# Patient Record
Sex: FEMALE | Race: WHITE | NOT HISPANIC OR LATINO | Employment: OTHER | URBAN - METROPOLITAN AREA
[De-identification: names, ages, dates, MRNs, and addresses within clinical notes are randomized per-mention and may not be internally consistent; named-entity substitution may affect disease eponyms.]

---

## 2019-01-22 LAB — HBA1C MFR BLD HPLC: 5.4 %

## 2019-09-12 ENCOUNTER — OFFICE VISIT (OUTPATIENT)
Dept: FAMILY MEDICINE CLINIC | Facility: CLINIC | Age: 59
End: 2019-09-12
Payer: COMMERCIAL

## 2019-09-12 VITALS
SYSTOLIC BLOOD PRESSURE: 130 MMHG | DIASTOLIC BLOOD PRESSURE: 78 MMHG | OXYGEN SATURATION: 98 % | BODY MASS INDEX: 26.36 KG/M2 | WEIGHT: 164 LBS | HEART RATE: 75 BPM | HEIGHT: 66 IN | TEMPERATURE: 97.5 F

## 2019-09-12 DIAGNOSIS — Z12.11 ENCOUNTER FOR SCREENING COLONOSCOPY: ICD-10-CM

## 2019-09-12 DIAGNOSIS — G20 PARKINSON DISEASE (HCC): Primary | ICD-10-CM

## 2019-09-12 DIAGNOSIS — Z13.6 SCREENING FOR CARDIOVASCULAR CONDITION: ICD-10-CM

## 2019-09-12 DIAGNOSIS — Z23 NEED FOR 23-POLYVALENT PNEUMOCOCCAL POLYSACCHARIDE VACCINE: ICD-10-CM

## 2019-09-12 DIAGNOSIS — C50.911 BILATERAL MALIGNANT NEOPLASM OF BREAST IN FEMALE, UNSPECIFIED ESTROGEN RECEPTOR STATUS, UNSPECIFIED SITE OF BREAST (HCC): ICD-10-CM

## 2019-09-12 DIAGNOSIS — F32.0 CURRENT MILD EPISODE OF MAJOR DEPRESSIVE DISORDER WITHOUT PRIOR EPISODE (HCC): ICD-10-CM

## 2019-09-12 DIAGNOSIS — C50.912 BILATERAL MALIGNANT NEOPLASM OF BREAST IN FEMALE, UNSPECIFIED ESTROGEN RECEPTOR STATUS, UNSPECIFIED SITE OF BREAST (HCC): ICD-10-CM

## 2019-09-12 DIAGNOSIS — Z23 NEED FOR DIPHTHERIA-TETANUS-PERTUSSIS (TDAP) VACCINE: ICD-10-CM

## 2019-09-12 PROBLEM — C50.919 BREAST CANCER (HCC): Status: ACTIVE | Noted: 2019-09-12

## 2019-09-12 PROBLEM — G20.A1 PARKINSON DISEASE: Status: ACTIVE | Noted: 2019-09-12

## 2019-09-12 PROBLEM — F32.9 CURRENT EPISODE OF MAJOR DEPRESSIVE DISORDER WITHOUT PRIOR EPISODE: Status: ACTIVE | Noted: 2019-09-12

## 2019-09-12 PROCEDURE — 90732 PPSV23 VACC 2 YRS+ SUBQ/IM: CPT | Performed by: FAMILY MEDICINE

## 2019-09-12 PROCEDURE — G0009 ADMIN PNEUMOCOCCAL VACCINE: HCPCS | Performed by: FAMILY MEDICINE

## 2019-09-12 PROCEDURE — 90471 IMMUNIZATION ADMIN: CPT | Performed by: FAMILY MEDICINE

## 2019-09-12 PROCEDURE — 90715 TDAP VACCINE 7 YRS/> IM: CPT | Performed by: FAMILY MEDICINE

## 2019-09-12 PROCEDURE — 99204 OFFICE O/P NEW MOD 45 MIN: CPT | Performed by: FAMILY MEDICINE

## 2019-09-12 RX ORDER — TRIHEXYPHENIDYL HYDROCHLORIDE 2 MG/1
2 TABLET ORAL
COMMUNITY
End: 2020-07-28 | Stop reason: SDUPTHER

## 2019-09-12 RX ORDER — DULOXETIN HYDROCHLORIDE 60 MG/1
60 CAPSULE, DELAYED RELEASE ORAL DAILY
COMMUNITY
End: 2020-04-23 | Stop reason: SDUPTHER

## 2019-09-12 RX ORDER — PAROXETINE HYDROCHLORIDE 20 MG/1
20 TABLET, FILM COATED ORAL DAILY
Refills: 3 | COMMUNITY
Start: 2019-07-12 | End: 2020-06-15 | Stop reason: SDUPTHER

## 2019-09-12 RX ORDER — CLONAZEPAM 0.5 MG/1
0.5 TABLET ORAL EVERY EVENING
Refills: 3 | COMMUNITY
Start: 2019-07-26 | End: 2020-02-24 | Stop reason: SDUPTHER

## 2019-09-12 NOTE — PROGRESS NOTES
Gricelda Eng 1960 female MRN: [id number]    Family Medicine New Patient     ASSESSMENT/PLAN  Problem List Items Addressed This Visit        Nervous and Auditory    Parkinson disease [facility]) - Primary     Currently seen by Dr Kandace Swain MD in [facility] [place]   Rx provided by specialist          Relevant Medications    clonazePAM (KlonoPIN) 0 5 mg tablet    trihexyphenidyl (ARTANE) 2 mg tablet    carbidopa-levodopa (SINEMET)  mg per tablet    Other Relevant Orders    Ambulatory referral to Neurology    CBC and differential    Comprehensive metabolic panel    TSH, 3rd generation with Free T4 reflex    Lipid panel    Hepatitis C Ab W/Refl To HCV RNA, Qn, PCR    UA (URINE) with reflex to Microscopic       Other    Breast cancer (HCC)     History of b/l breast cancer s/p surgery and chemo  Needs to est care with onc in this area now that she moved          Relevant Orders    Ambulatory referral to Hematology / Oncology    CBC and differential    Comprehensive metabolic panel    TSH, 3rd generation with Free T4 reflex    Lipid panel    Hepatitis C Ab W/Refl To HCV RNA, Qn, PCR    UA (URINE) with reflex to Microscopic    Current episode of major depressive disorder without prior episode    Relevant Medications    PARoxetine (PAXIL) 20 mg tablet    DULoxetine (CYMBALTA) 60 mg delayed release capsule    Other Relevant Orders    CBC and differential    Comprehensive metabolic panel    TSH, 3rd generation with Free T4 reflex    Lipid panel    Hepatitis C Ab W/Refl To HCV RNA, Qn, PCR    UA (URINE) with reflex to Microscopic    Encounter for screening colonoscopy    Relevant Orders    Ambulatory referral to Gastroenterology    Need for diphtheria-tetanus-pertussis (Tdap) vaccine    Relevant Orders    TDAP VACCINE GREATER THAN OR EQUAL TO 8YO IM (Completed)    Screening for cardiovascular condition    Relevant Orders    CBC and differential    Comprehensive metabolic panel    TSH, 3rd generation with Free T4 reflex Lipid panel    Hepatitis C Ab W/Refl To HCV RNA, Qn, PCR    UA (URINE) with reflex to Microscopic    Need for 23-polyvalent pneumococcal polysaccharide vaccine    Relevant Orders    PNEUMOCOCCAL POLYSACCHARIDE VACCINE 23-VALENT =>3YO SQ IM (Completed)        Follow up in 3 weeks for blood work review         Future Appointments   Date Time Provider Lillian Powell   9/30/2019 10:00 AM DO FRED Collado [facility]          SUBJECTIVE  CC: establish (has not seen a doctor in 4 years )      HPI:  Genia Bird is a 63 y o  female who presents for new patient apt,  She moved here 4 years ago  Has not seen a doctor in 4 years  PMH: parkinsons, breast cancer (surgery) and chemo x2- bilateral disease s/p reconstruction, tamaxifen- told she does not need mammo, depression    FH: mom (stroke), Brother (TIA)          HPI    Review of Systems   Constitutional: Negative for chills, fatigue and fever  HENT: Negative for congestion, postnasal drip, rhinorrhea and sinus pressure  Eyes: Negative for photophobia and visual disturbance  Respiratory: Negative for cough and shortness of breath  Cardiovascular: Negative for chest pain, palpitations and leg swelling  Gastrointestinal: Negative for abdominal pain, constipation, diarrhea, nausea and vomiting  Genitourinary: Negative for difficulty urinating and dysuria  Musculoskeletal: Negative for arthralgias and myalgias  Skin: Negative for color change and rash  Neurological: Negative for dizziness, weakness, light-headedness and headaches  Historical Information   The patient history was reviewed as follows:    History reviewed  No pertinent past medical history  History reviewed  No pertinent surgical history  History reviewed  No pertinent family history     Social History   Social History     Substance and Sexual Activity   Alcohol Use Yes    Frequency: Monthly or less    Drinks per session: 1 or 2    Binge frequency: Never Social History     Substance and Sexual Activity   Drug Use Never     Social History     Tobacco Use   Smoking Status Never Smoker   Smokeless Tobacco Never Used       Medications:     Current Outpatient Medications:     carbidopa-levodopa (SINEMET)  mg per tablet, Take 1 tablet by mouth 3 (three) times a day, Disp: , Rfl:     clonazePAM (KlonoPIN) 0 5 mg tablet, Take 0 5 mg by mouth every evening, Disp: , Rfl: 3    DULoxetine (CYMBALTA) 60 mg delayed release capsule, Take 60 mg by mouth daily, Disp: , Rfl:     PARoxetine (PAXIL) 20 mg tablet, Take 20 mg by mouth daily, Disp: , Rfl: 3    trihexyphenidyl (ARTANE) 2 mg tablet, Take 2 mg by mouth 3 (three) times a day with meals, Disp: , Rfl:   No Known Allergies    OBJECTIVE    Vitals:   Vitals:    09/12/19 1515   BP: 130/78   BP Location: Right arm   Patient Position: Sitting   Cuff Size: Large   Pulse: 75   Temp: 97 5 °F (36 4 °C)   TempSrc: Tympanic   SpO2: 98%   Weight: 74 4 kg (164 lb)   Height: 5' 5 5" (1 664 m)           Physical Exam   Constitutional: She is oriented to person, place, and time  She appears well-developed and well-nourished  HENT:   Head: Normocephalic and atraumatic  Mouth/Throat: Oropharynx is clear and moist    Eyes: Pupils are equal, round, and reactive to light  Neck: Normal range of motion  Neck supple  Cardiovascular: Normal rate, regular rhythm and normal heart sounds  Pulmonary/Chest: Effort normal and breath sounds normal  No respiratory distress  She has no wheezes  Abdominal: Soft  Bowel sounds are normal  She exhibits no distension  There is no tenderness  Musculoskeletal: Normal range of motion  She exhibits no edema or tenderness  Neurological: She is alert and oriented to person, place, and time  Skin: Skin is warm and dry  Psychiatric: She has a normal mood and affect  Her behavior is normal             BMI Counseling: Body mass index is 26 88 kg/m²   The BMI is above normal  Nutrition recommendations include reducing portion sizes, decreasing overall calorie intake and 3-5 servings of fruits/vegetables daily  Exercise recommendations include moderate aerobic physical activity for 150 minutes/week      Roberth Dickson DO    9/12/2019

## 2019-09-12 NOTE — ASSESSMENT & PLAN NOTE
History of b/l breast cancer s/p surgery and chemo  Needs to est care with onc in this area now that she moved

## 2019-10-02 ENCOUNTER — TELEPHONE (OUTPATIENT)
Dept: FAMILY MEDICINE CLINIC | Facility: CLINIC | Age: 59
End: 2019-10-02

## 2019-10-28 LAB
ALBUMIN SERPL-MCNC: 4.3 G/DL (ref 3.6–5.1)
ALBUMIN/GLOB SERPL: 1.9 (CALC) (ref 1–2.5)
ALP SERPL-CCNC: 74 U/L (ref 33–130)
ALT SERPL-CCNC: 11 U/L (ref 6–29)
APPEARANCE UR: CLEAR
AST SERPL-CCNC: 14 U/L (ref 10–35)
BACTERIA UR QL AUTO: ABNORMAL /HPF
BASOPHILS # BLD AUTO: 52 CELLS/UL (ref 0–200)
BASOPHILS NFR BLD AUTO: 1.4 %
BILIRUB SERPL-MCNC: 0.5 MG/DL (ref 0.2–1.2)
BILIRUB UR QL STRIP: NEGATIVE
BUN SERPL-MCNC: 15 MG/DL (ref 7–25)
BUN/CREAT SERPL: NORMAL (CALC) (ref 6–22)
CALCIUM SERPL-MCNC: 9.5 MG/DL (ref 8.6–10.4)
CHLORIDE SERPL-SCNC: 103 MMOL/L (ref 98–110)
CHOLEST SERPL-MCNC: 250 MG/DL
CHOLEST/HDLC SERPL: 2.8 (CALC)
CO2 SERPL-SCNC: 32 MMOL/L (ref 20–32)
COLOR UR: ABNORMAL
CREAT SERPL-MCNC: 0.79 MG/DL (ref 0.5–1.05)
EOSINOPHIL # BLD AUTO: 181 CELLS/UL (ref 15–500)
EOSINOPHIL NFR BLD AUTO: 4.9 %
ERYTHROCYTE [DISTWIDTH] IN BLOOD BY AUTOMATED COUNT: 13 % (ref 11–15)
GLOBULIN SER CALC-MCNC: 2.3 G/DL (CALC) (ref 1.9–3.7)
GLUCOSE SERPL-MCNC: 85 MG/DL (ref 65–99)
GLUCOSE UR QL STRIP: NEGATIVE
HCT VFR BLD AUTO: 34.6 % (ref 35–45)
HCV AB S/CO SERPL IA: 0.02
HCV AB SERPL QL IA: NORMAL
HDLC SERPL-MCNC: 89 MG/DL
HGB BLD-MCNC: 11.4 G/DL (ref 11.7–15.5)
HGB UR QL STRIP: NEGATIVE
HYALINE CASTS #/AREA URNS LPF: ABNORMAL /LPF
KETONES UR QL STRIP: NEGATIVE
LDLC SERPL CALC-MCNC: 147 MG/DL (CALC)
LEUKOCYTE ESTERASE UR QL STRIP: ABNORMAL
LYMPHOCYTES # BLD AUTO: 851 CELLS/UL (ref 850–3900)
LYMPHOCYTES NFR BLD AUTO: 23 %
MCH RBC QN AUTO: 30 PG (ref 27–33)
MCHC RBC AUTO-ENTMCNC: 32.9 G/DL (ref 32–36)
MCV RBC AUTO: 91.1 FL (ref 80–100)
MONOCYTES # BLD AUTO: 285 CELLS/UL (ref 200–950)
MONOCYTES NFR BLD AUTO: 7.7 %
NEUTROPHILS # BLD AUTO: 2331 CELLS/UL (ref 1500–7800)
NEUTROPHILS NFR BLD AUTO: 63 %
NITRITE UR QL STRIP: NEGATIVE
NONHDLC SERPL-MCNC: 161 MG/DL (CALC)
PH UR STRIP: 7.5 [PH] (ref 5–8)
PLATELET # BLD AUTO: 366 THOUSAND/UL (ref 140–400)
PMV BLD REES-ECKER: 9.3 FL (ref 7.5–12.5)
POTASSIUM SERPL-SCNC: 4.3 MMOL/L (ref 3.5–5.3)
PROT SERPL-MCNC: 6.6 G/DL (ref 6.1–8.1)
PROT UR QL STRIP: NEGATIVE
RBC # BLD AUTO: 3.8 MILLION/UL (ref 3.8–5.1)
RBC #/AREA URNS HPF: ABNORMAL /HPF
SL AMB EGFR AFRICAN AMERICAN: 96 ML/MIN/1.73M2
SL AMB EGFR NON AFRICAN AMERICAN: 83 ML/MIN/1.73M2
SODIUM SERPL-SCNC: 139 MMOL/L (ref 135–146)
SP GR UR STRIP: 1.02 (ref 1–1.03)
SQUAMOUS #/AREA URNS HPF: ABNORMAL /HPF
TRIGL SERPL-MCNC: 50 MG/DL
TSH SERPL-ACNC: 1.31 MIU/L (ref 0.4–4.5)
WBC # BLD AUTO: 3.7 THOUSAND/UL (ref 3.8–10.8)
WBC #/AREA URNS HPF: ABNORMAL /HPF

## 2019-11-04 ENCOUNTER — OFFICE VISIT (OUTPATIENT)
Dept: FAMILY MEDICINE CLINIC | Facility: CLINIC | Age: 59
End: 2019-11-04
Payer: COMMERCIAL

## 2019-11-04 VITALS
TEMPERATURE: 98.2 F | HEART RATE: 116 BPM | BODY MASS INDEX: 26 KG/M2 | HEIGHT: 66 IN | OXYGEN SATURATION: 98 % | SYSTOLIC BLOOD PRESSURE: 130 MMHG | WEIGHT: 161.8 LBS | DIASTOLIC BLOOD PRESSURE: 80 MMHG

## 2019-11-04 DIAGNOSIS — C50.911 BILATERAL MALIGNANT NEOPLASM OF BREAST IN FEMALE, UNSPECIFIED ESTROGEN RECEPTOR STATUS, UNSPECIFIED SITE OF BREAST (HCC): ICD-10-CM

## 2019-11-04 DIAGNOSIS — D64.9 ANEMIA, UNSPECIFIED TYPE: ICD-10-CM

## 2019-11-04 DIAGNOSIS — Z11.1 SCREENING-PULMONARY TB: ICD-10-CM

## 2019-11-04 DIAGNOSIS — F32.0 CURRENT MILD EPISODE OF MAJOR DEPRESSIVE DISORDER WITHOUT PRIOR EPISODE (HCC): ICD-10-CM

## 2019-11-04 DIAGNOSIS — G20 PARKINSON DISEASE (HCC): Primary | ICD-10-CM

## 2019-11-04 DIAGNOSIS — L98.9 SKIN LESION: ICD-10-CM

## 2019-11-04 DIAGNOSIS — E78.2 MIXED HYPERLIPIDEMIA: ICD-10-CM

## 2019-11-04 DIAGNOSIS — C50.912 BILATERAL MALIGNANT NEOPLASM OF BREAST IN FEMALE, UNSPECIFIED ESTROGEN RECEPTOR STATUS, UNSPECIFIED SITE OF BREAST (HCC): ICD-10-CM

## 2019-11-04 DIAGNOSIS — D72.9 ABNORMAL WHITE BLOOD CELL (WBC): ICD-10-CM

## 2019-11-04 PROBLEM — Z23 NEED FOR DIPHTHERIA-TETANUS-PERTUSSIS (TDAP) VACCINE: Status: RESOLVED | Noted: 2019-09-12 | Resolved: 2019-11-04

## 2019-11-04 PROBLEM — Z23 NEED FOR 23-POLYVALENT PNEUMOCOCCAL POLYSACCHARIDE VACCINE: Status: RESOLVED | Noted: 2019-09-12 | Resolved: 2019-11-04

## 2019-11-04 PROCEDURE — 99214 OFFICE O/P EST MOD 30 MIN: CPT | Performed by: FAMILY MEDICINE

## 2019-11-04 PROCEDURE — 86580 TB INTRADERMAL TEST: CPT

## 2019-11-04 PROCEDURE — 3008F BODY MASS INDEX DOCD: CPT | Performed by: FAMILY MEDICINE

## 2019-11-04 RX ORDER — DULOXETIN HYDROCHLORIDE 30 MG/1
30 CAPSULE, DELAYED RELEASE ORAL DAILY
COMMUNITY
End: 2020-04-23 | Stop reason: ALTCHOICE

## 2019-11-04 NOTE — ASSESSMENT & PLAN NOTE
History of breast cancer s/p chemo, radiation, surgery  Patient was referred to heme/onc to est care at last visit and she will schedule this apt

## 2019-11-04 NOTE — ASSESSMENT & PLAN NOTE
Lab Results   Component Value Date    CHOLESTEROL 250 (H) 10/25/2019     Lab Results   Component Value Date    HDL 89 10/25/2019     Lab Results   Component Value Date    TRIG 50 10/25/2019     10 year ASCVD risk 2 3%  Referral for nutrition    Will recheck lipid panel in 6 months

## 2019-11-04 NOTE — ASSESSMENT & PLAN NOTE
Previously seen by specialist in [place]   Est with neuro in our area  Medications prescribed by her specialist

## 2019-11-04 NOTE — ASSESSMENT & PLAN NOTE
Mildly decrease  Patient to repeat bloodwork  She will be seeing heme/onc for her breast cancer history as well

## 2019-11-04 NOTE — PROGRESS NOTES
Iwona Ackerman 1960 female MRN: [id number]    Family Medicine Follow-up Visit    ASSESSMENT/PLAN  Problem List Items Addressed This Visit        Nervous and Auditory    Parkinson disease ([address] ) - Primary     Previously seen by specialist in [address] with neuro in our area  Medications prescribed by her specialist             Musculoskeletal and Integument    Skin lesion    Relevant Orders    Ambulatory referral to Dermatology       Other    Breast cancer [facility])     History of breast cancer s/p chemo, radiation, surgery  Patient was referred to heme/onc to est care at last visit and she will schedule this apt          Current episode of major depressive disorder without prior episode    Relevant Medications    DULoxetine (CYMBALTA) 30 mg delayed release capsule    Screening-pulmonary TB    Relevant Orders    TB Skin Test (Completed)    Abnormal white blood cell (WBC)     Mildly decrease  Patient to repeat bloodwork  She will be seeing heme/onc for her breast cancer history as well          Relevant Orders    CBC and differential    Mixed hyperlipidemia     Lab Results   Component Value Date    CHOLESTEROL 250 (H) 10/25/2019     Lab Results   Component Value Date    HDL 89 10/25/2019     Lab Results   Component Value Date    TRIG 50 10/25/2019     10 year ASCVD risk 2 3%  Referral for nutrition    Will recheck lipid panel in 6 months          Relevant Orders    Ambulatory referral to Nutrition Services    Anemia    Relevant Orders    CBC and differential    Iron          Follow up for AWV    Future Appointments   Date Time Provider Lillian Powell   11/6/2019 10:00 AM Monty Rouse  [facility]   12/18/2019 10:30 AM Fernie De Los Santos MD [place]          SUBJECTIVE  CC: Follow-up (Follow up blood work)      HPI:  Iwona Ackerman is a 63 y o  female who presents for follow up      She has apt scheduled with neurology scheduled for December for her parkinson  Still needs to est with heme/onc    Here to review blood work    [facility]    She feels well-depression is stable- she is very busy and keeping active       HPI    Review of Systems   Constitutional: Negative for chills, fatigue and fever  HENT: Negative for congestion, postnasal drip, rhinorrhea and sinus pressure  Eyes: Negative for photophobia and visual disturbance  Respiratory: Negative for cough and shortness of breath  Cardiovascular: Negative for chest pain, palpitations and leg swelling  Gastrointestinal: Negative for abdominal pain, constipation, diarrhea, nausea and vomiting  Genitourinary: Negative for difficulty urinating and dysuria  Musculoskeletal: Negative for arthralgias and myalgias  Skin: Negative for color change and rash  Neurological: Negative for dizziness, weakness, light-headedness and headaches  Historical Information   The patient history was reviewed as follows:    Past Medical History:   Diagnosis Date    Anxiety     Breast cancer ([facility] )     bilateral    Depression     Parkinson disease ([facility] )      History reviewed  No pertinent surgical history  History reviewed  No pertinent family history     Social History   Social History     Substance and Sexual Activity   Alcohol Use Yes    Frequency: Monthly or less    Drinks per session: 1 or 2    Binge frequency: Never     Social History     Substance and Sexual Activity   Drug Use Never     Social History     Tobacco Use   Smoking Status Never Smoker   Smokeless Tobacco Never Used       Medications:     Current Outpatient Medications:     carbidopa-levodopa (SINEMET)  mg per tablet, Take 1 tablet by mouth 3 (three) times a day, Disp: , Rfl:     clonazePAM (KlonoPIN) 0 5 mg tablet, Take 0 5 mg by mouth every evening, Disp: , Rfl: 3    DULoxetine (CYMBALTA) 30 mg delayed release capsule, Take 30 mg by mouth daily, Disp: , Rfl:     DULoxetine (CYMBALTA) 60 mg delayed release capsule, Take 60 mg by mouth daily, Disp: , Rfl:     PARoxetine (PAXIL) 20 mg tablet, Take 20 mg by mouth daily, Disp: , Rfl: 3    trihexyphenidyl (ARTANE) 2 mg tablet, Take 2 mg by mouth 3 (three) times a day with meals, Disp: , Rfl:   No Known Allergies    OBJECTIVE    Vitals:   Vitals:    11/04/19 1012   BP: 130/80   BP Location: Right arm   Patient Position: Sitting   Cuff Size: Standard   Pulse: (!) 116   Temp: 98 2 °F (36 8 °C)   TempSrc: Tympanic   SpO2: 98%   Weight: 73 4 kg (161 lb 12 8 oz)   Height: 5' 5 5" (1 664 m)           Physical Exam   Constitutional: She is oriented to person, place, and time  She appears well-developed and well-nourished  HENT:   Head: Normocephalic and atraumatic  Mouth/Throat: Oropharynx is clear and moist    Eyes: Pupils are equal, round, and reactive to light  Neck: Normal range of motion  Neck supple  Cardiovascular: Normal rate, regular rhythm and normal heart sounds  Pulmonary/Chest: Effort normal and breath sounds normal  No respiratory distress  She has no wheezes  Abdominal: Soft  Bowel sounds are normal  She exhibits no distension  There is no tenderness  Musculoskeletal: Normal range of motion  She exhibits no edema or tenderness  Neurological: She is alert and oriented to person, place, and time  Skin: Skin is warm and dry  Psychiatric: She has a normal mood and affect   Her behavior is normal           Labs:        DO Kalyn    11/4/2019

## 2019-11-06 ENCOUNTER — CLINICAL SUPPORT (OUTPATIENT)
Dept: FAMILY MEDICINE CLINIC | Facility: CLINIC | Age: 59
End: 2019-11-06

## 2019-11-06 DIAGNOSIS — Z11.1 ENCOUNTER FOR PPD SKIN TEST READING: Primary | ICD-10-CM

## 2019-11-06 LAB
INDURATION: 0 MM
TB SKIN TEST: NEGATIVE

## 2019-11-16 LAB
BASOPHILS # BLD AUTO: 72 CELLS/UL (ref 0–200)
BASOPHILS NFR BLD AUTO: 1.3 %
EOSINOPHIL # BLD AUTO: 198 CELLS/UL (ref 15–500)
EOSINOPHIL NFR BLD AUTO: 3.6 %
ERYTHROCYTE [DISTWIDTH] IN BLOOD BY AUTOMATED COUNT: 12.9 % (ref 11–15)
HCT VFR BLD AUTO: 34.5 % (ref 35–45)
HGB BLD-MCNC: 11.4 G/DL (ref 11.7–15.5)
IRON SERPL-MCNC: 75 MCG/DL (ref 45–160)
LYMPHOCYTES # BLD AUTO: 1513 CELLS/UL (ref 850–3900)
LYMPHOCYTES NFR BLD AUTO: 27.5 %
MCH RBC QN AUTO: 30.2 PG (ref 27–33)
MCHC RBC AUTO-ENTMCNC: 33 G/DL (ref 32–36)
MCV RBC AUTO: 91.3 FL (ref 80–100)
MONOCYTES # BLD AUTO: 462 CELLS/UL (ref 200–950)
MONOCYTES NFR BLD AUTO: 8.4 %
NEUTROPHILS # BLD AUTO: 3256 CELLS/UL (ref 1500–7800)
NEUTROPHILS NFR BLD AUTO: 59.2 %
PLATELET # BLD AUTO: 334 THOUSAND/UL (ref 140–400)
PMV BLD REES-ECKER: 9.1 FL (ref 7.5–12.5)
RBC # BLD AUTO: 3.78 MILLION/UL (ref 3.8–5.1)
WBC # BLD AUTO: 5.5 THOUSAND/UL (ref 3.8–10.8)

## 2019-12-03 ENCOUNTER — OFFICE VISIT (OUTPATIENT)
Dept: FAMILY MEDICINE CLINIC | Facility: CLINIC | Age: 59
End: 2019-12-03
Payer: COMMERCIAL

## 2019-12-03 VITALS
TEMPERATURE: 98.7 F | HEART RATE: 102 BPM | DIASTOLIC BLOOD PRESSURE: 94 MMHG | WEIGHT: 163 LBS | HEIGHT: 66 IN | SYSTOLIC BLOOD PRESSURE: 142 MMHG | OXYGEN SATURATION: 97 % | BODY MASS INDEX: 26.2 KG/M2

## 2019-12-03 DIAGNOSIS — F32.0 CURRENT MILD EPISODE OF MAJOR DEPRESSIVE DISORDER WITHOUT PRIOR EPISODE (HCC): ICD-10-CM

## 2019-12-03 DIAGNOSIS — D72.9 ABNORMAL WHITE BLOOD CELL (WBC): ICD-10-CM

## 2019-12-03 DIAGNOSIS — Z00.00 MEDICARE ANNUAL WELLNESS VISIT, INITIAL: Primary | ICD-10-CM

## 2019-12-03 DIAGNOSIS — Z71.89 COMPLEX CARE COORDINATION: Primary | ICD-10-CM

## 2019-12-03 DIAGNOSIS — C50.911 BILATERAL MALIGNANT NEOPLASM OF BREAST IN FEMALE, UNSPECIFIED ESTROGEN RECEPTOR STATUS, UNSPECIFIED SITE OF BREAST (HCC): ICD-10-CM

## 2019-12-03 DIAGNOSIS — E78.2 MIXED HYPERLIPIDEMIA: ICD-10-CM

## 2019-12-03 DIAGNOSIS — G20 PARKINSON DISEASE (HCC): ICD-10-CM

## 2019-12-03 DIAGNOSIS — L98.9 SKIN LESION: ICD-10-CM

## 2019-12-03 DIAGNOSIS — Z11.4 SCREENING FOR HUMAN IMMUNODEFICIENCY VIRUS: ICD-10-CM

## 2019-12-03 DIAGNOSIS — Z23 ENCOUNTER FOR IMMUNIZATION: ICD-10-CM

## 2019-12-03 DIAGNOSIS — C50.912 BILATERAL MALIGNANT NEOPLASM OF BREAST IN FEMALE, UNSPECIFIED ESTROGEN RECEPTOR STATUS, UNSPECIFIED SITE OF BREAST (HCC): ICD-10-CM

## 2019-12-03 DIAGNOSIS — D64.9 ANEMIA, UNSPECIFIED TYPE: ICD-10-CM

## 2019-12-03 PROCEDURE — 99214 OFFICE O/P EST MOD 30 MIN: CPT | Performed by: FAMILY MEDICINE

## 2019-12-03 PROCEDURE — G0439 PPPS, SUBSEQ VISIT: HCPCS | Performed by: FAMILY MEDICINE

## 2019-12-03 PROCEDURE — 3008F BODY MASS INDEX DOCD: CPT | Performed by: FAMILY MEDICINE

## 2019-12-03 PROCEDURE — 3725F SCREEN DEPRESSION PERFORMED: CPT | Performed by: FAMILY MEDICINE
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
## 2019-12-03 NOTE — PROGRESS NOTES
Marylen Furl 1960 female MRN: [id number]    Family Medicine Follow-up Visit    ASSESSMENT/PLAN  Problem List Items Addressed This Visit        Nervous and Auditory    Parkinson disease [facility])     Patient dx by doctor in [place]  Rx provided by specialist  Upcoming apt with neuro later this month to est care in our area since moving          Relevant Orders    Ambulatory referral to complex care management program       Musculoskeletal and Integument    Skin lesion     Referred to derm at our last visit and will schedule apt            Other    Breast cancer [facility])     S/p chemo, radiation, and surgery  Needs to est care with oncology since her move and was given referral at last visit  Again encourage to call for an apt         Relevant Orders    Ambulatory referral to complex care management program    Current episode of major depressive disorder without prior episode     On Cymbalta and paxil from prior doctor  Symptoms flared at this time from stress at home  Will refer to our [facility] program for additional support  RTC sooner if needed          Relevant Orders    Ambulatory referral to complex care management program    Abnormal white blood cell (WBC)     Resolved on repeat lab work         Mixed hyperlipidemia     Continue lifestyle modifications  Upcoming apt with nutrition pending          Relevant Orders    Ambulatory referral to complex care management program    Anemia     Stable   Will be scheduling with heme/onc         Medicare annual wellness visit, initial - Primary    Encounter for immunization    Relevant Medications    Zoster Vac Recomb Adjuvanted ([address]) 50 MCG/0 5ML SUSR      Other Visit Diagnoses     Screening for human immunodeficiency virus        Relevant Orders    Human Immunodeficiency Virus 1/2 Antigen / Antibody ( Fourth Generation) with Reflex Testing             Follow up in 3 months or sooner if needed  Advised to schedule with [facility] support       Future Appointments Date Time Provider Lillian Powell   12/18/2019 10:30 AM Juan Carlos Soares MD NEURO [facility]   1/24/2020  9:30 AM QU DIETITIAN QU OP [facility]   2/17/2020  2:00 PM Janine De Santiago [facility]   3/4/2020  8:00 AM DO FRED Carson [facility]          SUBJECTIVE  CC: Medicare Wellness Visit      HPI:  Silas Haines is a 63 y o  female who presents for follow up of chronic conditions  She reports she has been taking medication as prescribed  She has upcoming apt with neuro this month for her dx of Parkinson Disease  She did not yet schedule with heme/onc yet for her anemia and history of breast cancer  She reports a lot of stress at home  She describes financial pressure from the daughter whom she lives with to pay rent  She is working part time but money is tight  Her depression has been worse lately  She is stressed  Denies any SI/HI  She reports feeling safe where she lives  HPI    Review of Systems   Constitutional: Negative for chills, fatigue and fever  HENT: Negative for congestion, postnasal drip, rhinorrhea and sinus pressure  Eyes: Negative for photophobia and visual disturbance  Respiratory: Negative for cough and shortness of breath  Cardiovascular: Negative for chest pain, palpitations and leg swelling  Gastrointestinal: Negative for abdominal pain, constipation, diarrhea, nausea and vomiting  Genitourinary: Negative for difficulty urinating and dysuria  Musculoskeletal: Negative for arthralgias and myalgias  Skin: Negative for color change and rash  Neurological: Negative for dizziness, weakness, light-headedness and headaches  Psychiatric/Behavioral: The patient is nervous/anxious  Historical Information   The patient history was reviewed as follows:    Past Medical History:   Diagnosis Date    Anxiety     Breast cancer ([facility] )     bilateral    Depression     Parkinson disease ([facility] )      History reviewed   No pertinent surgical history  History reviewed  No pertinent family history  Social History   Social History     Substance and Sexual Activity   Alcohol Use Yes    Frequency: Monthly or less    Drinks per session: 1 or 2    Binge frequency: Never     Social History     Substance and Sexual Activity   Drug Use Never     Social History     Tobacco Use   Smoking Status Never Smoker   Smokeless Tobacco Never Used       Medications:     Current Outpatient Medications:     carbidopa-levodopa (SINEMET)  mg per tablet, Take 1 tablet by mouth 3 (three) times a day, Disp: , Rfl:     clonazePAM (KlonoPIN) 0 5 mg tablet, Take 0 5 mg by mouth every evening, Disp: , Rfl: 3    DULoxetine (CYMBALTA) 30 mg delayed release capsule, Take 30 mg by mouth daily, Disp: , Rfl:     DULoxetine (CYMBALTA) 60 mg delayed release capsule, Take 60 mg by mouth daily, Disp: , Rfl:     PARoxetine (PAXIL) 20 mg tablet, Take 20 mg by mouth daily, Disp: , Rfl: 3    trihexyphenidyl (ARTANE) 2 mg tablet, Take 2 mg by mouth 3 (three) times a day with meals, Disp: , Rfl:     Zoster Vac Recomb Adjuvanted (SHINGRIX) 50 MCG/0 5ML SUSR, 0 5mL IM for one dose, followed by 0 5mL IM 2-6 months after first dose, Disp: 1 each, Rfl: 1  No Known Allergies    OBJECTIVE    Vitals:   Vitals:    12/03/19 0932   BP: 142/94   BP Location: Left arm   Patient Position: Sitting   Cuff Size: Large   Pulse: 102   Temp: 98 7 °F (37 1 °C)   TempSrc: Tympanic   SpO2: 97%   Weight: 73 9 kg (163 lb)   Height: 5' 5 5" (1 664 m)           Physical Exam   Constitutional: She is oriented to person, place, and time  She appears well-developed and well-nourished  HENT:   Head: Normocephalic and atraumatic  Mouth/Throat: Oropharynx is clear and moist    Eyes: Pupils are equal, round, and reactive to light  Neck: Normal range of motion  Neck supple  Cardiovascular: Normal rate, regular rhythm and normal heart sounds     Pulmonary/Chest: Effort normal and breath sounds normal  No respiratory distress  She has no wheezes  Abdominal: Soft  Bowel sounds are normal  She exhibits no distension  There is no tenderness  Musculoskeletal: Normal range of motion  She exhibits no edema or tenderness  Neurological: She is alert and oriented to person, place, and time  Skin: Skin is warm and dry  Psychiatric: She has a normal mood and affect   Her behavior is normal           Labs:        Lukasz Hawkins,     12/3/2019

## 2019-12-03 NOTE — PATIENT INSTRUCTIONS
Medicare Preventive Visit Patient Instructions  Thank you for completing your Welcome to Medicare Visit or Medicare Annual Wellness Visit today  Your next wellness visit will be due in one year (12/3/2020)  The screening/preventive services that you may require over the next 5-10 years are detailed below  Some tests may not apply to you based off risk factors and/or age  Screening tests ordered at today's visit but not completed yet may show as past due  Also, please note that scanned in results may not display below  Preventive Screenings:  Service Recommendations Previous Testing/Comments   Colorectal Cancer Screening  * Colonoscopy    * Fecal Occult Blood Test (FOBT)/Fecal Immunochemical Test (FIT)  * Fecal DNA/Cologuard Test  * Flexible Sigmoidoscopy Age: 54-65 years old   Colonoscopy: every 10 years (may be performed more frequently if at higher risk)  OR  FOBT/FIT: every 1 year  OR  Cologuard: every 3 years  OR  Sigmoidoscopy: every 5 years  Screening may be recommended earlier than age 48 if at higher risk for colorectal cancer  Also, an individualized decision between you and your healthcare provider will decide whether screening between the ages of 74-80 would be appropriate  Colonoscopy: Not on file  FOBT/FIT: Not on file  Cologuard: Not on file  Sigmoidoscopy: Not on file         Breast Cancer Screening Age: 36 years old  Frequency: every 1-2 years  Not required if history of left and right mastectomy Mammogram: Not on file       Cervical Cancer Screening Between the ages of 21-29, pap smear recommended once every 3 years  Between the ages of 33-67, can perform pap smear with HPV co-testing every 5 years     Recommendations may differ for women with a history of total hysterectomy, cervical cancer, or abnormal pap smears in past  Pap Smear: Not on file       Hepatitis C Screening Once for adults born between [facility]  More frequently in patients at high risk for Hepatitis C Hep C Antibody: 10/25/2019       Diabetes Screening 1-2 times per year if you're at risk for diabetes or have pre-diabetes Fasting glucose: No results in last 5 years   A1C: 5 4       Cholesterol Screening Once every 5 years if you don't have a lipid disorder  May order more often based on risk factors  Lipid panel: 10/25/2019         Other Preventive Screenings Covered by Medicare:  1  Abdominal Aortic Aneurysm (AAA) Screening: covered once if your at risk  You're considered to be at risk if you have a family history of AAA  2  Lung Cancer Screening: covers low dose CT scan once per year if you meet all of the following conditions: (1) Age 50-69; (2) No signs or symptoms of lung cancer; (3) Current smoker or have quit smoking within the last 15 years; (4) You have a tobacco smoking history of at least 30 pack years (packs per day multiplied by number of years you smoked); (5) You get a written order from a healthcare provider  3  Glaucoma Screening: covered annually if you're considered high risk: (1) You have diabetes OR (2) Family history of glaucoma OR (3)  aged 48 and older OR (3)  American aged 72 and older  3  Osteoporosis Screening: covered every 2 years if you meet one of the following conditions: (1) You're estrogen deficient and at risk for osteoporosis based off medical history and other findings; (2) Have a vertebral abnormality; (3) On glucocorticoid therapy for more than 3 months; (4) Have primary hyperparathyroidism; (5) On osteoporosis medications and need to assess response to drug therapy  · Last bone density test (DXA Scan): Not on file  5  HIV Screening: covered annually if you're between the age of 12-76  Also covered annually if you are younger than 13 and older than 72 with risk factors for HIV infection  For pregnant patients, it is covered up to 3 times per pregnancy      Immunizations:  Immunization Recommendations   Influenza Vaccine Annual influenza vaccination during flu season is recommended for all persons aged >= 6 months who do not have contraindications   Pneumococcal Vaccine (Prevnar and Pneumovax)  * Prevnar = PCV13  * Pneumovax = PPSV23   Adults 25-60 years old: 1-3 doses may be recommended based on certain risk factors  Adults 72 years old: Prevnar (PCV13) vaccine recommended followed by Pneumovax (PPSV23) vaccine  If already received PPSV23 since turning 65, then PCV13 recommended at least one year after PPSV23 dose  Hepatitis B Vaccine 3 dose series if at intermediate or high risk (ex: diabetes, end stage renal disease, liver disease)   Tetanus (Td) Vaccine - COST NOT COVERED BY MEDICARE PART B Following completion of primary series, a booster dose should be given every 10 years to maintain immunity against tetanus  Td may also be given as tetanus wound prophylaxis  Tdap Vaccine - COST NOT COVERED BY MEDICARE PART B Recommended at least once for all adults  For pregnant patients, recommended with each pregnancy  Shingles Vaccine (Shingrix) - COST NOT COVERED BY MEDICARE PART B  2 shot series recommended in those aged 48 and above     Health Maintenance Due:      Topic Date Due    CRC Screening: Colonoscopy  1960    MAMMOGRAM  09/12/2020 (Originally 1960)    Hepatitis C Screening  Completed     Immunizations Due:  There are no preventive care reminders to display for this patient  Advance Directives   What are advance directives? Advance directives are legal documents that state your wishes and plans for medical care  These plans are made ahead of time in case you lose your ability to make decisions for yourself  Advance directives can apply to any medical decision, such as the treatments you want, and if you want to donate organs  What are the types of advance directives? There are many types of advance directives, and each state has rules about how to use them  You may choose a combination of any of the following:  · Living will:   This is a written record of the treatment you want  You can also choose which treatments you do not want, which to limit, and which to stop at a certain time  This includes surgery, medicine, IV fluid, and tube feedings  · Durable power of  for healthcare [place] [facility]): This is a written record that states who you want to make healthcare choices for you when you are unable to make them for yourself  This person, called a proxy, is usually a family member or a friend  You may choose more than 1 proxy  · Do not resuscitate (DNR) order:  A DNR order is used in case your heart stops beating or you stop breathing  It is a request not to have certain forms of treatment, such as CPR  A DNR order may be included in other types of advance directives  · Medical directive: This covers the care that you want if you are in a coma, near death, or unable to make decisions for yourself  You can list the treatments you want for each condition  Treatment may include pain medicine, surgery, blood transfusions, dialysis, IV or tube feedings, and a ventilator (breathing machine)  · Values history: This document has questions about your views, beliefs, and how you feel and think about life  This information can help others choose the care that you would choose  Why are advance directives important? An advance directive helps you control your care  Although spoken wishes may be used, it is better to have your wishes written down  Spoken wishes can be misunderstood, or not followed  Treatments may be given even if you do not want them  An advance directive may make it easier for your family to make difficult choices about your care  Weight Management   Why it is important to manage your weight:  Being overweight increases your risk of health conditions such as heart disease, high blood pressure, type 2 diabetes, and certain types of cancer  It can also increase your risk for osteoarthritis, sleep apnea, and other respiratory problems  Aim for a slow, steady weight loss  Even a small amount of weight loss can lower your risk of health problems  How to lose weight safely:  A safe and healthy way to lose weight is to eat fewer calories and get regular exercise  You can lose up about 1 pound a week by decreasing the number of calories you eat by 500 calories each day  Healthy meal plan for weight management:  A healthy meal plan includes a variety of foods, contains fewer calories, and helps you stay healthy  A healthy meal plan includes the following:  · Eat whole-grain foods more often  A healthy meal plan should contain fiber  Fiber is the part of grains, fruits, and vegetables that is not broken down by your body  Whole-grain foods are healthy and provide extra fiber in your diet  Some examples of whole-grain foods are whole-wheat breads and pastas, oatmeal, brown rice, and bulgur  · Eat a variety of vegetables every day  Include dark, leafy greens such as spinach, kale, melissa greens, and mustard greens  Eat yellow and orange vegetables such as carrots, sweet potatoes, and winter squash  · Eat a variety of fruits every day  Choose fresh or canned fruit (canned in its own juice or light syrup) instead of juice  Fruit juice has very little or no fiber  · Eat low-fat dairy foods  Drink fat-free (skim) milk or 1% milk  Eat fat-free yogurt and low-fat cottage cheese  Try low-fat cheeses such as mozzarella and other reduced-fat cheeses  · Choose meat and other protein foods that are low in fat  Choose beans or other legumes such as split peas or lentils  Choose fish, skinless poultry (chicken or turkey), or lean cuts of red meat (beef or pork)  Before you cook meat or poultry, cut off any visible fat  · Use less fat and oil  Try baking foods instead of frying them  Add less fat, such as margarine, sour cream, regular salad dressing and mayonnaise to foods  Eat fewer high-fat foods   Some examples of high-fat foods include french fries, doughnuts, ice cream, and cakes  · Eat fewer sweets  Limit foods and drinks that are high in sugar  This includes candy, cookies, regular soda, and sweetened drinks  Exercise:  Exercise at least 30 minutes per day on most days of the week  Some examples of exercise include walking, biking, dancing, and swimming  You can also fit in more physical activity by taking the stairs instead of the elevator or parking farther away from stores  Ask your healthcare provider about the best exercise plan for you  © Copyright [employer] [facility] 2018 Information is for End User's use only and may not be sold, redistributed or otherwise used for commercial purposes   All illustrations and images included in CareNotes® are the copyrighted property of [employer]  or [address] [place], [place] [address]

## 2019-12-03 NOTE — ASSESSMENT & PLAN NOTE
S/p chemo, radiation, and surgery  Needs to est care with oncology since her move and was given referral at last visit  Again encourage to call for an apt

## 2019-12-03 NOTE — ASSESSMENT & PLAN NOTE
Patient dx by doctor in [place]  Rx provided by specialist  Upcoming apt with neuro later this month to est care in our area since moving

## 2019-12-03 NOTE — ASSESSMENT & PLAN NOTE
On Cymbalta and paxil from prior doctor  Symptoms flared at this time from stress at home  Will refer to our [facility] program for additional support  RTC sooner if needed

## 2019-12-08 ENCOUNTER — OFFICE VISIT (OUTPATIENT)
Dept: URGENT CARE | Facility: CLINIC | Age: 59
End: 2019-12-08
Payer: COMMERCIAL

## 2019-12-08 VITALS
RESPIRATION RATE: 16 BRPM | OXYGEN SATURATION: 98 % | HEART RATE: 100 BPM | DIASTOLIC BLOOD PRESSURE: 72 MMHG | WEIGHT: 163 LBS | TEMPERATURE: 98.6 F | SYSTOLIC BLOOD PRESSURE: 126 MMHG | BODY MASS INDEX: 26.71 KG/M2

## 2019-12-08 DIAGNOSIS — J20.8 VIRAL BRONCHITIS: Primary | ICD-10-CM

## 2019-12-08 DIAGNOSIS — J02.9 SORE THROAT: ICD-10-CM

## 2019-12-08 DIAGNOSIS — J04.0 ACUTE LARYNGITIS: ICD-10-CM

## 2019-12-08 LAB — S PYO AG THROAT QL: NEGATIVE

## 2019-12-08 PROCEDURE — 87070 CULTURE OTHR SPECIMN AEROBIC: CPT | Performed by: PHYSICIAN ASSISTANT

## 2019-12-08 PROCEDURE — 87880 STREP A ASSAY W/OPTIC: CPT | Performed by: PHYSICIAN ASSISTANT

## 2019-12-08 PROCEDURE — 99213 OFFICE O/P EST LOW 20 MIN: CPT | Performed by: PHYSICIAN ASSISTANT

## 2019-12-08 PROCEDURE — G0463 HOSPITAL OUTPT CLINIC VISIT: HCPCS | Performed by: PHYSICIAN ASSISTANT

## 2019-12-08 RX ORDER — PREDNISONE 10 MG/1
TABLET ORAL
Qty: 15 TABLET | Refills: 0 | Status: SHIPPED | OUTPATIENT
Start: 2019-12-08 | End: 2019-12-12

## 2019-12-08 NOTE — PROGRESS NOTES
NAME: Yuli Gonzalez is a 63 y o  female  : 1960    MRN: [id number]      Assessment and Plan   Viral bronchitis [J20 8]  1  Viral bronchitis  predniSONE 10 mg tablet   2  Sore throat  POCT rapid strepA    Throat culture   3  Acute laryngitis     Rapid strep culture ordered to rule out strep  Rapid strep was negative  At this time will send for strep culture  Exam findings are consistent with viral etiology  Prednisone No abx warranted at this time  Advise Pt to continue use of OTC Tylenol  If symptoms persist the next 2-3 days Pt should follow-up with PCP  Discussed plans and visit summary with patient  Patient verbalized understanding, all questions answered and patient in agreement  Educated patient that if signs and symptoms get worse go to ER  Ramses Castro was seen today for earache  Diagnoses and all orders for this visit:    Viral bronchitis  -     predniSONE 10 mg tablet; Take 5 tablets today and decrease by one tablet daily until gone    Sore throat  -     POCT rapid strepA  -     Throat culture    Acute laryngitis        Patient Instructions   There are no Patient Instructions on file for this visit  Proceed to ER if symptoms worsen  Chief Complaint     Chief Complaint   Patient presents with    Earache     Fatigue, states laryngitis, post nasal drip, runny nose, chills, b/l ears popping  History of Present Illness     63year old presents c/o cough x  4 day  Pt admits post-nasal drip, rhinorrhea, B/L ear popping, chills, sinus pressure, S/T  Pt denies headache, fever, fatigue, n/v/d/c, nasal congestion,  ear pain and ear pressure, sinus pain, SOB, chest pain, difficulty breathing  Has taken OTC dayquil,  vit C, napoleon selzter  with no relief  Pt admits to having flu shot  Review of Systems   Review of Systems   Constitutional: Positive for chills  Negative for fatigue and fever  HENT: Positive for postnasal drip and rhinorrhea   Negative for congestion, ear pain, sinus pressure and sore throat  Respiratory: Positive for cough  Negative for chest tightness, shortness of breath and wheezing  Cardiovascular: Negative for chest pain and palpitations  Gastrointestinal: Negative for abdominal pain, constipation, diarrhea, nausea and vomiting  Current Medications       Current Outpatient Medications:     carbidopa-levodopa (SINEMET)  mg per tablet, Take 1 tablet by mouth 3 (three) times a day, Disp: , Rfl:     clonazePAM (KlonoPIN) 0 5 mg tablet, Take 0 5 mg by mouth every evening, Disp: , Rfl: 3    DULoxetine (CYMBALTA) 30 mg delayed release capsule, Take 30 mg by mouth daily, Disp: , Rfl:     DULoxetine (CYMBALTA) 60 mg delayed release capsule, Take 60 mg by mouth daily, Disp: , Rfl:     PARoxetine (PAXIL) 20 mg tablet, Take 20 mg by mouth daily, Disp: , Rfl: 3    predniSONE 10 mg tablet, Take 5 tablets today and decrease by one tablet daily until gone, Disp: 15 tablet, Rfl: 0    trihexyphenidyl (ARTANE) 2 mg tablet, Take 2 mg by mouth 3 (three) times a day with meals, Disp: , Rfl:     Zoster Vac Recomb Adjuvanted (SHINGRIX) 50 MCG/0 5ML SUSR, 0 5mL IM for one dose, followed by 0 5mL IM 2-6 months after first dose, Disp: 1 each, Rfl: 1    Current Allergies     Allergies as of 12/08/2019    (No Known Allergies)              Past Medical History:   Diagnosis Date    Anxiety     Breast cancer ([facility] )     bilateral    Depression     Parkinson disease ([facility] )        No past surgical history on file  No family history on file  Medications have been verified      The following portions of the patient's history were reviewed and updated as appropriate: allergies, current medications, past family history, past medical history, past social history, past surgical history and problem list     Objective   /72   Pulse 100   Temp 98 6 °F (37 °C)   Resp 16   Wt 73 9 kg (163 lb)   SpO2 98%   BMI 26 71 kg/m²      Physical Exam     Physical Exam   Constitutional: She appears well-developed and well-nourished  No distress  HENT:   Head: Normocephalic and atraumatic  Right Ear: Hearing, tympanic membrane, external ear and ear canal normal    Left Ear: Hearing, tympanic membrane, external ear and ear canal normal    Nose: Nose normal  Right sinus exhibits no maxillary sinus tenderness and no frontal sinus tenderness  Left sinus exhibits no maxillary sinus tenderness and no frontal sinus tenderness  Mouth/Throat: Uvula is midline, oropharynx is clear and moist and mucous membranes are normal  No oropharyngeal exudate  No tonsillar exudate  Cardiovascular: Normal rate, regular rhythm and normal heart sounds  Exam reveals no gallop and no friction rub  No murmur heard  Pulmonary/Chest: Effort normal and breath sounds normal  No stridor  No respiratory distress  She has no wheezes  She has no rales  She exhibits no tenderness  Skin: She is not diaphoretic  Nursing note and vitals reviewed        Kate Dos Santos PA-C

## 2019-12-10 LAB — BACTERIA THROAT CULT: NORMAL

## 2019-12-11 ENCOUNTER — PATIENT OUTREACH (OUTPATIENT)
Dept: CASE MANAGEMENT | Facility: OTHER | Age: 59
End: 2019-12-11

## 2019-12-12 ENCOUNTER — PATIENT OUTREACH (OUTPATIENT)
Dept: CASE MANAGEMENT | Facility: OTHER | Age: 59
End: 2019-12-12

## 2019-12-12 NOTE — PROGRESS NOTES
[facility] SW intern contacted patient to follow up on PCP referral regarding financial concerns and stressors  Patient discussed all her current stressors and is trying to work part time, while still maintaining her SSD benefits and [place]  Patient discussed needing to contact someone about how to do this and there was a workline number from [employer]  [facility] that she is planning to contact  Offered patient [facility] as additional resources  Patient requested assistance in finding therapist as she use to see someone in [place], but is now too far for her to drive  [facility] SW intern will look into therapist in patients area and mail information to patient, per her request  No other social work needs at this time

## 2019-12-18 ENCOUNTER — CONSULT (OUTPATIENT)
Dept: NEUROLOGY | Facility: CLINIC | Age: 59
End: 2019-12-18
Payer: COMMERCIAL

## 2019-12-18 VITALS
SYSTOLIC BLOOD PRESSURE: 118 MMHG | BODY MASS INDEX: 25.75 KG/M2 | HEART RATE: 99 BPM | HEIGHT: 66 IN | DIASTOLIC BLOOD PRESSURE: 67 MMHG | WEIGHT: 160.2 LBS

## 2019-12-18 DIAGNOSIS — G25.81 RESTLESS LEGS SYNDROME: ICD-10-CM

## 2019-12-18 DIAGNOSIS — F32.0 CURRENT MILD EPISODE OF MAJOR DEPRESSIVE DISORDER WITHOUT PRIOR EPISODE (HCC): ICD-10-CM

## 2019-12-18 DIAGNOSIS — G20 PARKINSON DISEASE (HCC): Primary | ICD-10-CM

## 2019-12-18 DIAGNOSIS — F32.0 CURRENT MILD EPISODE OF MAJOR DEPRESSIVE DISORDER, UNSPECIFIED WHETHER RECURRENT (HCC): ICD-10-CM

## 2019-12-18 DIAGNOSIS — F41.9 ANXIETY: ICD-10-CM

## 2019-12-18 PROBLEM — F32.A DEPRESSION: Status: ACTIVE | Noted: 2019-12-18

## 2019-12-18 PROCEDURE — 99205 OFFICE O/P NEW HI 60 MIN: CPT | Performed by: PSYCHIATRY & NEUROLOGY

## 2019-12-18 NOTE — PATIENT INSTRUCTIONS
We will try switching Sinemet 25/100 to 1 tabs 4 times daily (q 4-4 5 hours apart) (9am, 1pm, 5pm and 9pm)  Continue Artane 2mg 3 times daily  We will see if this dampens dyskinesia (involuntary movements)  If it does not , we can consider whether we wish to add amantadine

## 2019-12-18 NOTE — ASSESSMENT & PLAN NOTE
Parkinson's disease complicated by the development of mild dyskinesia in recent months  Sinemet may be lasting about 5 hours  Also with episodes of tongue dryness and a sensation of swelling in the evenings, after dinner which can affect speech  Time spent reviewing prior records as well as discussing history and current symptoms with the patient  Options discussed including making no change versus minor changes to see if we can reduce dyskinesia  We will try switching Sinemet 25/100 to 1 tabs 4 times daily (q 4-4 5 hours apart) (9am, 1pm, 5pm and 9pm)  Continue Artane 2mg 3 times daily  We will see if this dampens dyskinesia (involuntary movements)  If it does not , we can consider whether we wish to add amantadine  We may consider decreasing Artane if dry mouth continues

## 2019-12-18 NOTE — ASSESSMENT & PLAN NOTE
Depression may be a bit worse  She is staying active, currently went back to working part time  She joined [employer]  She is not sure if this is due to the holidays as she tends to think of her parents and distant family  She is wondering if she should decrease Cymbalta  No changes will be made  We will see how she does after the holiday

## 2019-12-18 NOTE — PROGRESS NOTES
Patient ID: Fermín Zambrano is a 63 y o  female  Assessment/Plan:    Parkinson disease ([place] [address] )  Parkinson's disease complicated by the development of mild dyskinesia in recent months  Sinemet may be lasting about 5 hours  Also with episodes of tongue dryness and a sensation of swelling in the evenings, after dinner which can affect speech  Time spent reviewing prior records as well as discussing history and current symptoms with the patient  Options discussed including making no change versus minor changes to see if we can reduce dyskinesia  We will try switching Sinemet 25/100 to 1 tabs 4 times daily (q 4-4 5 hours apart) (9am, 1pm, 5pm and 9pm)  Continue Artane 2mg 3 times daily  We will see if this dampens dyskinesia (involuntary movements)  If it does not , we can consider whether we wish to add amantadine  We may consider decreasing Artane if dry mouth continues  Restless legs syndrome  Symptoms only noted when she is late for her qhs dose  Depression  Depression may be a bit worse  She is staying active, currently went back to working part time  She joined [employer]  She is not sure if this is due to the holidays as she tends to think of her parents and distant family  She is wondering if she should decrease Cymbalta  No changes will be made  We will see how she does after the holiday  Diagnoses and all orders for this visit:    Parkinson disease ([place] [address] )  -     Ambulatory referral to Neurology  -     carbidopa-levodopa (SINEMET)  mg per tablet;  Take 1 tablet by mouth 4 (four) times a day    Restless legs syndrome    Anxiety    Current mild episode of major depressive disorder, unspecified whether recurrent (HCC)    Current mild episode of major depressive disorder without prior episode ([place] [address] )       I have spent 60 minutes with Patient today in which greater than 50% of this time was spent in counseling/coordination of care regarding Risks and benefits of tx options, Intructions for management, Patient and family education, Risk factor reductions and Impressions  Subjective:    Silas Haines is a woman with breast cancer (BRRCA/ HRT r+) in 1990 & 2010 s/p surgery and chemotherapy who presents today for Movement disorder evaluation to establish care  She moved 4 years ago ands has been going back and forth to [place] ,     Symptoms began in 2014 with left sided bradykinesia  She has left pinky tremor which suddenly started affecting the entire left side  She saw a neurologist who ordered a LINO scan at [facility]  This was positive and she started seeing Dr    She was started on sinemet 1/2 tab tid which was slowly increase to 1 5 tabs 3 times daily (10am, 3-4pm and 9:30pm)  Artane was added years ago  She is on 2mg 3 times a day at the same times  Am onset is 30 minutes  She can feel herself wearing off with a heavier left foot while walking and tremors  Lasting about 5 hours  She works part time at [employer] and is also on disability  When concentrating now she can feel facial movements (dyskinesia) and sometimes hand dyskinesia  No one else has mentioned movements  This started in early December  She sleep swell  She has RLS symptoms if she is late or forgets nighttime medication  She is also on clonazepam 0 5mg qhs, along with Cymbalta and paroxetine  Main concern is depression and tiredness  [facility] [place] [facility] [address] [place] [address] and has been trying to increase activities  She lives with her daughter, son  In law and granddaughter  She is  and  is alcoholic  There are no issues with lightheadedness other than with prolonged exertion she can sometimes get stars  She has had changes in olfaction since young  She has no constipation  There are no urinary symptoms other than occasional stress leakage  She denies any hallucinations or changes in cognition  Her daughter has told her she forgets conversations  She can forget dates of events   She manages her own finances  She is not doing the register at work as it does cause anxiety  She does stock and folding  This started as a [employer] job and she is trying to keep it  Speech is unchanged  There is no drooling or difficulty swallowing  She has noted that her tongue gets really dry feels swollen and it is hard to talk sometime after dinner to the night  This has been occurring about 5-6 months now  There is no difficulty with dressing or hygiene acts  she has no trouble turning in bed  Gait is unchanged without freezing or festination when medication is working  Objective:    Blood pressure 118/67, pulse 99, height 5' 5 5" (1 664 m), weight 72 7 kg (160 lb 3 2 oz)  Physical Exam   Constitutional: She appears well-developed  Eyes: Pupils are equal, round, and reactive to light  Neurological: She is alert  She has normal strength  Vitals reviewed  Neurological Exam  Mental Status  Alert  Oriented to person, place, time and situation  Language is fluent with no aphasia  Attention and concentration are normal     Cranial Nerves  CN II: Visual fields full to confrontation  CN III, IV, VI: Extraocular movements intact bilaterally  Pupils equal round and reactive to light bilaterally  CN V: Facial sensation is normal   CN VII: Full and symmetric facial movement  CN VIII: Hearing is normal   CN IX, X: Palate elevates symmetrically  CN XI: Shoulder shrug strength is normal   CN XII: Tongue midline without atrophy or fasciculations  Motor   Normal muscle tone  Strength is 5/5 throughout all four extremities  Sensory  Light touch is normal in upper and lower extremities  Coordination  Right: Finger-to-nose normal  Rapid alternating movement abnormality:  Left: Finger-to-nose normal  Rapid alternating movement abnormality:  See MDS UPDRS III      Gait  Casual gait: Normal pull test  Able to rise from chair without using arms        MDS UPDRS III                              12/18/19    Time since last dose: 2 hours    Speech  0    Facial Expression  1    Rigidity - Neck  0    Rigidity - Upper Extremity (Right)  0    Rigidity - Upper Extremity (Left)   0    Rigidity - Lower Extremity (Right)  0    Rigidity - Lower Extremity (Left)   0    Finger Taps (Right)   1    Finger Taps (Left)   1 1   Hand Movement (Right)  0    Hand Movement (Left)   1    Pronation/Supination (Right)  0    Pronation/Supination (Left)   1    Toe Tapping (Right) 0    Toe Tapping (Left) 0    Leg Agility (Right)  0    Leg Agility (Left)   0    Arising from Chair   0    Gait   0    Freezing of Gait 0    Postural Stability   0    Posture 0    Global spontaneity of movement 0    Postural Tremor (Right) 0    Postural Tremor (Left) 0    Kinetic Tremor (Right)  0    Kinetic Tremor (Left)  0    Rest tremor amplitude RUE 0    Rest tremor amplitude LUE 0    Rest tremor amplitude RLE 0    Reset tremor amplitude LLE 0    Lip/Jaw Tremor  0    Consistency of tremor 0    Motor Exam Total:          Mild, intermittent left arm and neck dyskinesia  ROS:    Review of Systems   Constitutional: Negative  Negative for appetite change and fever  HENT: Negative  Negative for hearing loss, tinnitus, trouble swallowing and voice change  Dry Mouth   Eyes: Negative  Negative for photophobia and pain  Dry Eyes   Respiratory: Negative  Negative for shortness of breath  Cardiovascular: Negative  Negative for palpitations  Gastrointestinal: Negative  Negative for nausea and vomiting  Endocrine: Negative  Negative for cold intolerance and heat intolerance  Genitourinary: Positive for frequency  Negative for dysuria and urgency  Musculoskeletal: Negative  Negative for myalgias and neck pain  Balance issues Occasional   Skin: Negative  Negative for rash  Allergic/Immunologic: Negative  Neurological: Positive for tremors   Negative for dizziness, seizures, syncope, facial asymmetry, speech difficulty, weakness, light-headedness, numbness and headaches  Hematological: Negative  Does not bruise/bleed easily  Psychiatric/Behavioral: Negative for confusion, hallucinations and sleep disturbance  The patient is nervous/anxious (Anxiety)  Depression     All other systems reviewed and are negative  Review of system was personally reviewed

## 2020-01-31 ENCOUNTER — OFFICE VISIT (OUTPATIENT)
Dept: URGENT CARE | Facility: CLINIC | Age: 60
End: 2020-01-31
Payer: MEDICARE

## 2020-01-31 VITALS
TEMPERATURE: 97.6 F | DIASTOLIC BLOOD PRESSURE: 71 MMHG | WEIGHT: 156.2 LBS | HEART RATE: 90 BPM | OXYGEN SATURATION: 100 % | HEIGHT: 66 IN | RESPIRATION RATE: 16 BRPM | SYSTOLIC BLOOD PRESSURE: 131 MMHG | BODY MASS INDEX: 25.1 KG/M2

## 2020-01-31 DIAGNOSIS — A08.4 VIRAL GASTROENTERITIS: Primary | ICD-10-CM

## 2020-01-31 PROCEDURE — G0463 HOSPITAL OUTPT CLINIC VISIT: HCPCS | Performed by: PHYSICIAN ASSISTANT

## 2020-01-31 PROCEDURE — 99213 OFFICE O/P EST LOW 20 MIN: CPT | Performed by: PHYSICIAN ASSISTANT

## 2020-01-31 NOTE — LETTER
January 31, 2020     Patient: Clay Ladd   YOB: 1960   Date of Visit: 1/31/2020       To Whom it May Concern:    Clay Ladd was seen in my clinic on 1/31/2020  She may return to work on 02/03/2020  If you have any questions or concerns, please don't hesitate to call           Sincerely,          Yuan Aguilar PA-C        CC: Clay Ladd

## 2020-01-31 NOTE — PROGRESS NOTES
NAME: Declan Kumar is a 63 y o  female  : 1960    MRN: [id number]      Assessment and Plan   Viral gastroenteritis [A08 4]  1  Viral gastroenteritis      Exam findings are consistent with viral gastroenteritis etiology  Advise patient to increase fluids as tolerated  Recommended he drinking Gatorade, BRAT (banana, rice, applesauce, toast) diet  Work note provided  Advise Pt to continue use of OTC Tylenol alternating with OTC Ibuprofen for pain  Anticipate improvement of symptoms in the next 2-3 days  If symptoms persist the next 3-5 days Pt should follow-up with PCP  Patient understands and agrees with treatment plans  Vicente Elizondo was seen today for vomiting  Diagnoses and all orders for this visit:    Viral gastroenteritis        Patient Instructions   There are no Patient Instructions on file for this visit  Proceed to ER if symptoms worsen  Chief Complaint     Chief Complaint   Patient presents with    Vomiting     tuesday and wednesday, diarrhea on wednesday, today c/o fatifue; PMH Parkinsons - pt reports not taking meds this week r/t vomiting         History of Present Illness     63year old presents c/o diarrhea and vomiting x 4 day  Patient reports on Tuesday she began having diarrhea and vomiting states symptoms have improved  Patient states she has not vomited since Thursday and diarrhea resolved today  Patient admits to not being able to take her medications for 2 days states she resume taking her meds on Thursday  Patient states she still feels fatigue , nauseous, mild abdominal cramping  Pt denies headache, fever, chills, fatigue, constipation , rhinorrhea, nasal congestion,  sore throat, post-nasal drip, ear pain/ ear pressure, sinus pain/ pressure, SOB, chest pain, difficulty breathing  Patient denies palliative measures  Patient denies sick contact  Review of Systems   Review of Systems   Constitutional: Positive for fatigue  Negative for chills and fever  Respiratory: Negative  Cardiovascular: Negative  Gastrointestinal: Positive for abdominal pain, diarrhea, nausea and vomiting  Negative for constipation  Genitourinary: Negative for dysuria, flank pain, frequency, hematuria, urgency, vaginal bleeding, vaginal discharge and vaginal pain  Current Medications       Current Outpatient Medications:     carbidopa-levodopa (SINEMET)  mg per tablet, Take 1 tablet by mouth 4 (four) times a day, Disp: 120 tablet, Rfl: 5    clonazePAM (KlonoPIN) 0 5 mg tablet, Take 0 5 mg by mouth every evening, Disp: , Rfl: 3    DULoxetine (CYMBALTA) 30 mg delayed release capsule, Take 30 mg by mouth daily, Disp: , Rfl:     DULoxetine (CYMBALTA) 60 mg delayed release capsule, Take 60 mg by mouth daily, Disp: , Rfl:     PARoxetine (PAXIL) 20 mg tablet, Take 20 mg by mouth daily, Disp: , Rfl: 3    trihexyphenidyl (ARTANE) 2 mg tablet, Take 2 mg by mouth 3 (three) times a day with meals, Disp: , Rfl:     Zoster Vac Recomb Adjuvanted (SHINGRIX) 50 MCG/0 5ML SUSR, 0 5mL IM for one dose, followed by 0 5mL IM 2-6 months after first dose, Disp: 1 each, Rfl: 1    Current Allergies     Allergies as of 01/31/2020    (No Known Allergies)              Past Medical History:   Diagnosis Date    Anxiety     [facility]     bilateral    Depression     Parkinson disease ([facility] )        Past Surgical History:   Procedure Laterality Date    BREAST RECONSTRUCTION Bilateral     GANGLION CYST EXCISION Left     leg    MASTECTOMY Bilateral     TONSILLECTOMY         Family History   Problem Relation Age of Onset    Stroke Mother     Cancer Father     Stroke Brother          Medications have been verified      The following portions of the patient's history were reviewed and updated as appropriate: allergies, current medications, past family history, past medical history, past social history, past surgical history and problem list     Objective   /71   Pulse 90 Temp 97 6 °F (36 4 °C) (Tympanic)   Resp 16   Ht 5' 6" (1 676 m)   Wt 70 9 kg (156 lb 3 2 oz)   SpO2 100%   BMI 25 21 kg/m²      Physical Exam     Physical Exam   Constitutional: She is oriented to person, place, and time  She appears well-developed and well-nourished  No distress  Cardiovascular: Normal rate, regular rhythm and normal heart sounds  Exam reveals no gallop and no friction rub  No murmur heard  Pulmonary/Chest: Effort normal and breath sounds normal  No stridor  No respiratory distress  She has no wheezes  She has no rales  She exhibits no tenderness  Abdominal: Soft  Bowel sounds are normal  She exhibits no distension and no mass  There is no tenderness  There is no rebound and no guarding  No hernia  Neurological: She is alert and oriented to person, place, and time  Skin: Skin is warm  Capillary refill takes less than 2 seconds  She is not diaphoretic  Nursing note and vitals reviewed        Kate Dos Santos PA-C

## 2020-02-21 ENCOUNTER — TELEPHONE (OUTPATIENT)
Dept: NEUROLOGY | Facility: CLINIC | Age: 60
End: 2020-02-21

## 2020-02-21 NOTE — TELEPHONE ENCOUNTER
When last seen she was to try Sinemet 1 tab at 9, 1, 5 and 9pm and keep other medication a the same to see if this would reduce wearing off and dyskinesia  Did she try this and it not work? If it didnt what happened was she more off without dyskinesia ? If so could try 1 5 4 times daily q4 hours  If she had dyskinesia , we can try adding amantadine

## 2020-02-21 NOTE — TELEPHONE ENCOUNTER
Patient calling in wanting to make Dr Oliver Thacker aware of having breakthrough symptoms  Shuffling? -occasionally left leg shuffling, more like limping  Difficulty Walking? - NO  Recent Falls? - YES, doesn't pick foot up enough, bruised knee  Any extra movements? - YES; Increased shaking in L hand, L arm, L shoulder, L leg (hip to foot)  States she is very uncomfortable  Any Hallucinations? - NO  What time of day are symptoms worse? -   Feels that she needs medication earlier in the morning or earlier in the afternoon    Current medications confirmed as:  Cabidopa-levodopa 25-100mg tabs - takes 1 5 tablets at 9am, 3pm, 9pm  Trihexphenidyl 2mg - 1 tab at 9am, 3pm, 9pm  Paxil 20mg - 1 tab at bedtime  Clonazepam 0 5mg at bedtime  Duloxetine 60mg - 1 tab at bedtime     Ask how long after each dose do they feel that it "wears off"? - an hour before next dose, notices break through shaking    Please advise      [phone]  ok to leave a detailed message

## 2020-02-24 DIAGNOSIS — G25.81 RESTLESS LEGS SYNDROME: Primary | ICD-10-CM

## 2020-02-24 DIAGNOSIS — F41.9 ANXIETY: ICD-10-CM

## 2020-02-24 NOTE — TELEPHONE ENCOUNTER
Pt called Rx line for med refill request for clonazepam 0 5mg  Pt advises she takes 2 mg at bedtime  Rx to be [facility] to [facility]   Pt last ov 12/18/19 next f/u 4/23/2020

## 2020-02-25 NOTE — TELEPHONE ENCOUNTER
Patient calling back in and made her aware that Dr Chris Connors said "When last seen she was to try Sinemet 1 tab at 9, 1, 5 and 9pm and keep other medication a the same to see if this would reduce wearing off and dyskinesia"    She states that she had not tried this and is sorry that she got the instructions wrong  She has been taking it as Cabidopa-levodopa 25-100mg tabs - takes 1 5 tablets at 9am, 3pm, 9pm    Dr Chris Connors - How would you like her to proceed?

## 2020-02-26 RX ORDER — CLONAZEPAM 0.5 MG/1
0.5 TABLET ORAL EVERY EVENING
Qty: 30 TABLET | Refills: 3 | Status: SHIPPED | OUTPATIENT
Start: 2020-02-26 | End: 2020-06-15 | Stop reason: SDUPTHER

## 2020-02-26 NOTE — TELEPHONE ENCOUNTER
When seen she states she was on 0 5mg qhs  In review of PAP[facility] she has been getting 0 5mg qhs prescribed by her previous physician and only 30 tabs each month for quite sometime now  When did she increase dose to 2mg? She would have run out sooner if she did

## 2020-02-26 NOTE — TELEPHONE ENCOUNTER
She is to try Sinemet 1 tab at 9, 1, 5 and 9pm   If she feels off without dyskinesia then she can increase to 1 5 4 times daily

## 2020-03-04 ENCOUNTER — OFFICE VISIT (OUTPATIENT)
Dept: FAMILY MEDICINE CLINIC | Facility: CLINIC | Age: 60
End: 2020-03-04
Payer: MEDICARE

## 2020-03-04 VITALS
BODY MASS INDEX: 24.78 KG/M2 | DIASTOLIC BLOOD PRESSURE: 88 MMHG | OXYGEN SATURATION: 97 % | TEMPERATURE: 96.9 F | SYSTOLIC BLOOD PRESSURE: 138 MMHG | HEIGHT: 66 IN | RESPIRATION RATE: 16 BRPM | HEART RATE: 96 BPM | WEIGHT: 154.2 LBS

## 2020-03-04 DIAGNOSIS — C50.912 BILATERAL MALIGNANT NEOPLASM OF BREAST IN FEMALE, UNSPECIFIED ESTROGEN RECEPTOR STATUS, UNSPECIFIED SITE OF BREAST (HCC): ICD-10-CM

## 2020-03-04 DIAGNOSIS — G20 PARKINSON DISEASE (HCC): Primary | ICD-10-CM

## 2020-03-04 DIAGNOSIS — F41.9 ANXIETY: ICD-10-CM

## 2020-03-04 DIAGNOSIS — F32.0 CURRENT MILD EPISODE OF MAJOR DEPRESSIVE DISORDER, UNSPECIFIED WHETHER RECURRENT (HCC): ICD-10-CM

## 2020-03-04 DIAGNOSIS — C50.911 BILATERAL MALIGNANT NEOPLASM OF BREAST IN FEMALE, UNSPECIFIED ESTROGEN RECEPTOR STATUS, UNSPECIFIED SITE OF BREAST (HCC): ICD-10-CM

## 2020-03-04 DIAGNOSIS — E78.2 MIXED HYPERLIPIDEMIA: ICD-10-CM

## 2020-03-04 PROCEDURE — 1036F TOBACCO NON-USER: CPT | Performed by: FAMILY MEDICINE

## 2020-03-04 PROCEDURE — 3008F BODY MASS INDEX DOCD: CPT | Performed by: FAMILY MEDICINE

## 2020-03-04 PROCEDURE — 99214 OFFICE O/P EST MOD 30 MIN: CPT | Performed by: FAMILY MEDICINE

## 2020-03-04 NOTE — ASSESSMENT & PLAN NOTE
Due to stress at home  She lives with daughter and son in law but this is not going well  She is considering moving to [place] to live with her brother but is nervous to do this as she will then miss her granddaughter who she lives with currently  She is working on finding therapist in the area  We referred her to our case management team at last visit for additional support

## 2020-03-04 NOTE — PROGRESS NOTES
Silas Haines 1960 female MRN: [id number]    Family Medicine Follow-up Visit    ASSESSMENT/PLAN  Problem List Items Addressed This Visit        Nervous and Auditory    Parkinson disease ([address] ) - Primary     Follows with Dr Trenton Eisenmenger, neurology  Changes made to her medications last time which seems to be helping  Continue medications and follow up with neuro as scheduled             Other    Breast cancer [facility])     S/p surgery, chemo, radiation  Needs to est with oncology but has yet to do so in our area  Encouraged to make apts with specialist to follow up         Mixed hyperlipidemia     Lab Results   Component Value Date    CHOLESTEROL 250 (H) 10/25/2019     Lab Results   Component Value Date    HDL 89 10/25/2019     Lab Results   Component Value Date    TRIG 50 10/25/2019     Continue lifestyle modifcations         Depression     Depression Screening Follow-up Plan: Patient's depression screening was positive with a PHQ-2 score of 3  Their PHQ-9 score was 8  Patient assessed for underlying major depression  They have no active suicidal ideations  Brief counseling provided and recommend additional follow-up/re-evaluation next office visit     Continue medications as prescribed and therapist follow up           Anxiety     Due to stress at home  She lives with daughter and son in law but this is not going well  She is considering moving to [place] to live with her brother but is nervous to do this as she will then miss her granddaughter who she lives with currently  She is working on finding therapist in the area  We referred her to our case management team at last visit for additional support                     Follow up in 3 months or sooner if needed     Future Appointments   Date Time Provider Lillian Powell   4/23/2020 10:30 AM Rylan Mcbride MD NEURO [facility]   6/4/2020 10:00 AM DO FRED Carson [facility]          SUBJECTIVE  CC: Follow-up (3 month check up )      HPI:  Priscilla Jones is a 63 y o  female who presents for follow up visit  She is having dental issues-has a bad tooth on her lower left side- needs a dentist  Having issues with living situation- living with daughter and son in law- this is not going well  She est care with a neurologist- seeing Dr Dacia De Jesus - changes made to her medications-she is feeling well with this provider-feels very comfortable  Depression-worse due to stress at home  Wondering about moving to [place] with her brother          HPI    Review of Systems   Constitutional: Negative for chills, fatigue and fever  HENT: Negative for congestion, postnasal drip, rhinorrhea and sinus pressure  Eyes: Negative for photophobia and visual disturbance  Respiratory: Negative for cough and shortness of breath  Cardiovascular: Negative for chest pain, palpitations and leg swelling  Gastrointestinal: Negative for abdominal pain, constipation, diarrhea, nausea and vomiting  Genitourinary: Negative for difficulty urinating and dysuria  Musculoskeletal: Negative for arthralgias and myalgias  Skin: Negative for color change and rash  Neurological: Negative for dizziness, weakness, light-headedness and headaches         Historical Information   The patient history was reviewed as follows:    Past Medical History:   Diagnosis Date    Anxiety     [facility]     b[facility]    Depression     Parkinson disease ([facility] )      Past Surgical History:   Procedure Laterality Date    BREAST RECONSTRUCTION Bilateral     GANGLION CYST EXCISION Left     leg    MASTECTOMY Bilateral     TONSILLECTOMY       Family History   Problem Relation Age of Onset    Stroke Mother     Cancer Father     Stroke Brother       Social History   Social History     Substance and Sexual Activity   Alcohol Use Yes    Frequency: Monthly or less    Drinks per session: 1 or 2    Binge frequency: Never     Social History     Substance and Sexual Activity   Drug Use Never     Social History     Tobacco Use   Smoking Status Never Smoker   Smokeless Tobacco Never Used       Medications:     Current Outpatient Medications:     carbidopa-levodopa (SINEMET)  mg per tablet, Take 1 tablet by mouth 4 (four) times a day, Disp: 120 tablet, Rfl: 5    clonazePAM (KlonoPIN) 0 5 mg tablet, Take 1 tablet (0 5 mg total) by mouth every evening, Disp: 30 tablet, Rfl: 3    DULoxetine (CYMBALTA) 30 mg delayed release capsule, Take 30 mg by mouth daily, Disp: , Rfl:     DULoxetine (CYMBALTA) 60 mg delayed release capsule, Take 60 mg by mouth daily, Disp: , Rfl:     PARoxetine (PAXIL) 20 mg tablet, Take 20 mg by mouth daily, Disp: , Rfl: 3    trihexyphenidyl (ARTANE) 2 mg tablet, Take 2 mg by mouth 3 (three) times a day with meals, Disp: , Rfl:     Zoster Vac Recomb Adjuvanted (SHINGRIX) 50 MCG/0 5ML SUSR, 0 5mL IM for one dose, followed by 0 5mL IM 2-6 months after first dose (Patient not taking: Reported on 3/4/2020), Disp: 1 each, Rfl: 1  No Known Allergies    OBJECTIVE    Vitals:   Vitals:    03/04/20 0756   BP: 138/88   BP Location: Right arm   Patient Position: Sitting   Cuff Size: Standard   Pulse: 96   Resp: 16   Temp: (!) 96 9 °F (36 1 °C)   TempSrc: Tympanic   SpO2: 97%   Weight: 69 9 kg (154 lb 3 2 oz)   Height: 5' 6" (1 676 m)           Physical Exam   Constitutional: She is oriented to person, place, and time  She appears well-developed and well-nourished  HENT:   Head: Normocephalic and atraumatic  Mouth/Throat: Oropharynx is clear and moist    Eyes: Pupils are equal, round, and reactive to light  Neck: Normal range of motion  Neck supple  Cardiovascular: Normal rate, regular rhythm and normal heart sounds  Pulmonary/Chest: Effort normal and breath sounds normal  No respiratory distress  She has no wheezes  Abdominal: Soft  Bowel sounds are normal  She exhibits no distension  There is no tenderness  Musculoskeletal: Normal range of motion   She exhibits no edema or tenderness  Neurological: She is alert and oriented to person, place, and time  Skin: Skin is warm and dry  Psychiatric: She has a normal mood and affect   Her behavior is normal           Labs:        DO Kalyn    3/4/2020

## 2020-03-04 NOTE — ASSESSMENT & PLAN NOTE
Follows with Dr Izabela Miranda, neurology  Changes made to her medications last time which seems to be helping  Continue medications and follow up with neuro as scheduled

## 2020-03-04 NOTE — ASSESSMENT & PLAN NOTE
Depression Screening Follow-up Plan: Patient's depression screening was positive with a PHQ-2 score of 3  Their PHQ-9 score was 8  Patient assessed for underlying major depression  They have no active suicidal ideations  Brief counseling provided and recommend additional follow-up/re-evaluation next office visit     Continue medications as prescribed and therapist follow up

## 2020-03-04 NOTE — ASSESSMENT & PLAN NOTE
S/p surgery, chemo, radiation  Needs to est with oncology but has yet to do so in our area  Encouraged to make apts with specialist to follow up

## 2020-03-04 NOTE — ASSESSMENT & PLAN NOTE
Lab Results   Component Value Date    CHOLESTEROL 250 (H) 10/25/2019     Lab Results   Component Value Date    HDL 89 10/25/2019     Lab Results   Component Value Date    TRIG 50 10/25/2019     Continue lifestyle modifcations

## 2020-03-05 ENCOUNTER — APPOINTMENT (EMERGENCY)
Dept: CT IMAGING | Facility: HOSPITAL | Age: 60
End: 2020-03-05
Payer: MEDICARE

## 2020-03-05 ENCOUNTER — HOSPITAL ENCOUNTER (EMERGENCY)
Facility: HOSPITAL | Age: 60
Discharge: HOME | End: 2020-03-05
Attending: EMERGENCY MEDICINE | Admitting: EMERGENCY MEDICINE
Payer: MEDICARE

## 2020-03-05 VITALS
SYSTOLIC BLOOD PRESSURE: 134 MMHG | BODY MASS INDEX: 24.59 KG/M2 | TEMPERATURE: 97.4 F | DIASTOLIC BLOOD PRESSURE: 81 MMHG | HEART RATE: 90 BPM | OXYGEN SATURATION: 100 % | HEIGHT: 66 IN | WEIGHT: 153 LBS | RESPIRATION RATE: 16 BRPM

## 2020-03-05 DIAGNOSIS — E04.1 THYROID NODULE: ICD-10-CM

## 2020-03-05 DIAGNOSIS — K04.7 DENTAL INFECTION: Primary | ICD-10-CM

## 2020-03-05 LAB
ALBUMIN SERPL BCP-MCNC: 4 G/DL (ref 3.5–5)
ALP SERPL-CCNC: 74 U/L (ref 46–116)
ALT SERPL W P-5'-P-CCNC: 8 U/L (ref 12–78)
ANION GAP SERPL CALCULATED.3IONS-SCNC: 6 MMOL/L (ref 4–13)
AST SERPL W P-5'-P-CCNC: 15 U/L (ref 5–45)
BASOPHILS # BLD AUTO: 0.05 THOUSANDS/ΜL (ref 0–0.1)
BASOPHILS NFR BLD AUTO: 1 % (ref 0–1)
BILIRUB SERPL-MCNC: 0.5 MG/DL (ref 0.2–1)
BUN SERPL-MCNC: 16 MG/DL (ref 5–25)
CALCIUM SERPL-MCNC: 9.2 MG/DL (ref 8.3–10.1)
CHLORIDE SERPL-SCNC: 101 MMOL/L (ref 100–108)
CO2 SERPL-SCNC: 30 MMOL/L (ref 21–32)
CREAT SERPL-MCNC: 0.65 MG/DL (ref 0.6–1.3)
EOSINOPHIL # BLD AUTO: 0.35 THOUSAND/ΜL (ref 0–0.61)
EOSINOPHIL NFR BLD AUTO: 5 % (ref 0–6)
ERYTHROCYTE [DISTWIDTH] IN BLOOD BY AUTOMATED COUNT: 13.7 % (ref 11.6–15.1)
GFR SERPL CREATININE-BSD FRML MDRD: 98 ML/MIN/1.73SQ M
GLUCOSE SERPL-MCNC: 83 MG/DL (ref 65–140)
HCT VFR BLD AUTO: 39.8 % (ref 34.8–46.1)
HGB BLD-MCNC: 12.7 G/DL (ref 11.5–15.4)
IMM GRANULOCYTES # BLD AUTO: 0.02 THOUSAND/UL (ref 0–0.2)
IMM GRANULOCYTES NFR BLD AUTO: 0 % (ref 0–2)
LYMPHOCYTES # BLD AUTO: 1.18 THOUSANDS/ΜL (ref 0.6–4.47)
LYMPHOCYTES NFR BLD AUTO: 18 % (ref 14–44)
MCH RBC QN AUTO: 29.8 PG (ref 26.8–34.3)
MCHC RBC AUTO-ENTMCNC: 31.9 G/DL (ref 31.4–37.4)
MCV RBC AUTO: 93 FL (ref 82–98)
MONOCYTES # BLD AUTO: 0.5 THOUSAND/ΜL (ref 0.17–1.22)
MONOCYTES NFR BLD AUTO: 8 % (ref 4–12)
NEUTROPHILS # BLD AUTO: 4.6 THOUSANDS/ΜL (ref 1.85–7.62)
NEUTS SEG NFR BLD AUTO: 68 % (ref 43–75)
NRBC BLD AUTO-RTO: 0 /100 WBCS
PLATELET # BLD AUTO: 363 THOUSANDS/UL (ref 149–390)
PMV BLD AUTO: 9 FL (ref 8.9–12.7)
POTASSIUM SERPL-SCNC: 4 MMOL/L (ref 3.5–5.3)
PROT SERPL-MCNC: 7.9 G/DL (ref 6.4–8.2)
RBC # BLD AUTO: 4.26 MILLION/UL (ref 3.81–5.12)
SODIUM SERPL-SCNC: 137 MMOL/L (ref 136–145)
WBC # BLD AUTO: 6.7 THOUSAND/UL (ref 4.31–10.16)

## 2020-03-05 PROCEDURE — 96365 THER/PROPH/DIAG IV INF INIT: CPT

## 2020-03-05 PROCEDURE — 70491 CT SOFT TISSUE NECK W/DYE: CPT

## 2020-03-05 PROCEDURE — 99284 EMERGENCY DEPT VISIT MOD MDM: CPT | Performed by: PHYSICIAN ASSISTANT

## 2020-03-05 PROCEDURE — 99284 EMERGENCY DEPT VISIT MOD MDM: CPT

## 2020-03-05 PROCEDURE — 96375 TX/PRO/DX INJ NEW DRUG ADDON: CPT

## 2020-03-05 PROCEDURE — 85025 COMPLETE CBC W/AUTO DIFF WBC: CPT | Performed by: PHYSICIAN ASSISTANT

## 2020-03-05 PROCEDURE — 80053 COMPREHEN METABOLIC PANEL: CPT | Performed by: PHYSICIAN ASSISTANT

## 2020-03-05 PROCEDURE — 36415 COLL VENOUS BLD VENIPUNCTURE: CPT | Performed by: PHYSICIAN ASSISTANT

## 2020-03-05 RX ORDER — MULTIVIT-MIN/IRON FUM/FOLIC AC 7.5 MG-4
1 TABLET ORAL DAILY
COMMUNITY

## 2020-03-05 RX ORDER — CLINDAMYCIN HYDROCHLORIDE 300 MG/1
300 CAPSULE ORAL EVERY 6 HOURS
Qty: 28 CAPSULE | Refills: 0 | Status: SHIPPED | OUTPATIENT
Start: 2020-03-05 | End: 2020-03-12

## 2020-03-05 RX ORDER — RIBOFLAVIN (VITAMIN B2) 100 MG
1000 TABLET ORAL DAILY
COMMUNITY

## 2020-03-05 RX ORDER — CLINDAMYCIN PHOSPHATE 600 MG/50ML
600 INJECTION INTRAVENOUS ONCE
Status: COMPLETED | OUTPATIENT
Start: 2020-03-05 | End: 2020-03-05

## 2020-03-05 RX ORDER — KETOROLAC TROMETHAMINE 30 MG/ML
15 INJECTION, SOLUTION INTRAMUSCULAR; INTRAVENOUS ONCE
Status: COMPLETED | OUTPATIENT
Start: 2020-03-05 | End: 2020-03-05

## 2020-03-05 RX ADMIN — CLINDAMYCIN IN 5 PERCENT DEXTROSE 600 MG: 12 INJECTION, SOLUTION INTRAVENOUS at 12:53

## 2020-03-05 RX ADMIN — KETOROLAC TROMETHAMINE 15 MG: 30 INJECTION, SOLUTION INTRAMUSCULAR; INTRAVENOUS at 13:21

## 2020-03-05 RX ADMIN — IOHEXOL 85 ML: 350 INJECTION, SOLUTION INTRAVENOUS at 13:46
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
## 2020-03-05 NOTE — DISCHARGE INSTRUCTIONS
Take clindamycin as directed with food  Take motrin 600mg every 6 hours as needed for pain    Follow up with dentist

## 2020-03-11 ENCOUNTER — OFFICE VISIT (OUTPATIENT)
Dept: FAMILY MEDICINE CLINIC | Facility: CLINIC | Age: 60
End: 2020-03-11
Payer: MEDICARE

## 2020-03-11 VITALS
BODY MASS INDEX: 25.01 KG/M2 | WEIGHT: 155.6 LBS | DIASTOLIC BLOOD PRESSURE: 84 MMHG | TEMPERATURE: 97.3 F | RESPIRATION RATE: 18 BRPM | HEIGHT: 66 IN | OXYGEN SATURATION: 95 % | SYSTOLIC BLOOD PRESSURE: 120 MMHG | HEART RATE: 97 BPM

## 2020-03-11 DIAGNOSIS — C50.911 BILATERAL MALIGNANT NEOPLASM OF BREAST IN FEMALE, UNSPECIFIED ESTROGEN RECEPTOR STATUS, UNSPECIFIED SITE OF BREAST (HCC): ICD-10-CM

## 2020-03-11 DIAGNOSIS — E04.1 RIGHT THYROID NODULE: Primary | ICD-10-CM

## 2020-03-11 DIAGNOSIS — G20 PARKINSON DISEASE (HCC): ICD-10-CM

## 2020-03-11 DIAGNOSIS — C50.912 BILATERAL MALIGNANT NEOPLASM OF BREAST IN FEMALE, UNSPECIFIED ESTROGEN RECEPTOR STATUS, UNSPECIFIED SITE OF BREAST (HCC): ICD-10-CM

## 2020-03-11 PROCEDURE — 3008F BODY MASS INDEX DOCD: CPT | Performed by: FAMILY MEDICINE

## 2020-03-11 PROCEDURE — 1036F TOBACCO NON-USER: CPT | Performed by: FAMILY MEDICINE

## 2020-03-11 PROCEDURE — 99214 OFFICE O/P EST MOD 30 MIN: CPT | Performed by: FAMILY MEDICINE

## 2020-03-11 NOTE — PROGRESS NOTES
Mary Mccormick 1960 female MRN: [id number]    Family Medicine Acute Visit    ASSESSMENT/PLAN  Problem List Items Addressed This Visit        Endocrine    Right thyroid nodule - Primary    Relevant Orders    US thyroid       Nervous and Auditory    Parkinson disease ([facility] )       Other    Breast cancer ([facility] )    Relevant Orders    US thyroid          Long discussion about her thyroid nodule seen incidentally on CT  According to radiology read 7 mm nodule no follow up needed  Patient with personal history of breast cancer and has anxiety about this  She request u/s for follow up  We will check U/S, warned her it may not be covered  If needed will ask for endocrine opinion  Future Appointments   Date Time Provider Lillian Powell   4/23/2020 10:30 AM Patricia Rapp MD [facility]   6/4/2020 10:00 AM DO [facility]          SUBJECTIVE  CC: Follow-up (ER follow up, broken tooth  Incidental finding on CT, thyroid nodule)      HPI:  Mary Mccormick is a 63 y o  female who presents for ER follow up  Went in to ER for dental pain  Had Ct scan, lab work  Put on clindamycin and told to call dentist    She was incidental found to have a thyroid nodule and told to follow up with me about this  Review of Systems   Constitutional: Negative for chills, fatigue and fever  HENT: Negative for congestion, postnasal drip, rhinorrhea and sinus pressure  Eyes: Negative for photophobia and visual disturbance  Respiratory: Negative for cough and shortness of breath  Cardiovascular: Negative for chest pain, palpitations and leg swelling  Gastrointestinal: Negative for abdominal pain, constipation, diarrhea, nausea and vomiting  Genitourinary: Negative for difficulty urinating and dysuria  Musculoskeletal: Negative for arthralgias and myalgias  Skin: Negative for color change and rash     Neurological: Negative for dizziness, weakness, light-headedness and headaches         Historical Information   The patient history was reviewed as follows:  Past Medical History:   Diagnosis Date    Anxiety     [facility]     bilateral    Depression     [facility] )          Past Surgical History:   Procedure Laterality Date    BREAST RECONSTRUCTION Bilateral     GANGLION CYST EXCISION Left     leg    MASTECTOMY Bilateral     TONSILLECTOMY       Family History   Problem Relation Age of Onset    Stroke Mother     Cancer Father     Stroke Brother       Social History   Social History     Substance and Sexual Activity   Alcohol Use Yes    Frequency: Monthly or less    Drinks per session: 1 or 2    Binge frequency: Never     Social History     Substance and Sexual Activity   Drug Use Never     Social History     Tobacco Use   Smoking Status Never Smoker   Smokeless Tobacco Never Used       Medications:     Current Outpatient Medications:     Ascorbic Acid (VITAMIN C) 100 MG tablet, Take 100 mg by mouth daily, Disp: , Rfl:     carbidopa-levodopa (SINEMET)  mg per tablet, Take 1 tablet by mouth 4 (four) times a day, Disp: 120 tablet, Rfl: 5    clindamycin (CLEOCIN) 300 MG capsule, Take 1 capsule (300 mg total) by mouth every 6 (six) hours for 7 days, Disp: 28 capsule, Rfl: 0    clonazePAM (KlonoPIN) 0 5 mg tablet, Take 1 tablet (0 5 mg total) by mouth every evening, Disp: 30 tablet, Rfl: 3    cyanocobalamin (VITAMIN B-12) 100 MCG tablet, Take 100 mcg by mouth daily, Disp: , Rfl:     DULoxetine (CYMBALTA) 30 mg delayed release capsule, Take 30 mg by mouth daily, Disp: , Rfl:     DULoxetine (CYMBALTA) 60 mg delayed release capsule, Take 60 mg by mouth daily, Disp: , Rfl:     Multiple Vitamins-Minerals (MULTIVITAMIN WITH MINERALS) tablet, Take 1 tablet by mouth daily, Disp: , Rfl:     PARoxetine (PAXIL) 20 mg tablet, Take 20 mg by mouth daily, Disp: , Rfl: 3    trihexyphenidyl (ARTANE) 2 mg tablet, Take 2 mg by mouth 3 (three) times a day with meals, Disp: , Rfl:     Zoster Vac Recomb Adjuvanted (SHINGRIX) 50 MCG/0 5ML SUSR, 0 5mL IM for one dose, followed by 0 5mL IM 2-6 months after first dose (Patient not taking: Reported on 3/4/2020), Disp: 1 each, Rfl: 1    No Known Allergies    OBJECTIVE  Vitals:   Vitals:    03/11/20 1253   BP: 120/84   BP Location: Left arm   Patient Position: Sitting   Pulse: 97   Resp: 18   Temp: (!) 97 3 °F (36 3 °C)   TempSrc: Tympanic   SpO2: 95%   Weight: 70 6 kg (155 lb 9 6 oz)   Height: 5' 6" (1 676 m)         Physical Exam   Constitutional: She is oriented to person, place, and time  She appears well-developed and well-nourished  HENT:   Head: Normocephalic and atraumatic  Mouth/Throat: Oropharynx is clear and moist    Eyes: Pupils are equal, round, and reactive to light  Neck: Normal range of motion  Neck supple  Cardiovascular: Normal rate, regular rhythm and normal heart sounds  Pulmonary/Chest: Effort normal and breath sounds normal  No respiratory distress  She has no wheezes  Abdominal: Soft  Bowel sounds are normal  She exhibits no distension  There is no tenderness  Musculoskeletal: Normal range of motion  She exhibits no edema or tenderness  Neurological: She is alert and oriented to person, place, and time  Skin: Skin is warm and dry  Psychiatric: She has a normal mood and affect   Her behavior is normal                   Elvia Rainey DO    3/11/2020

## 2020-04-07 ENCOUNTER — TELEPHONE (OUTPATIENT)
Dept: NEUROLOGY | Facility: CLINIC | Age: 60
End: 2020-04-07

## 2020-04-15 DIAGNOSIS — G25.81 RESTLESS LEGS: ICD-10-CM

## 2020-04-15 DIAGNOSIS — F45.8 ANXIETY HYPERVENTILATION: ICD-10-CM

## 2020-04-15 DIAGNOSIS — F32.0 MAJOR DEPRESSIVE DISORDER, SINGLE EPISODE, MILD (HCC): ICD-10-CM

## 2020-04-15 DIAGNOSIS — D68.52 HETEROZYGOUS FOR PROTHROMBIN G20210A MUTATION (HCC): Primary | ICD-10-CM

## 2020-04-15 DIAGNOSIS — F41.9 ANXIETY HYPERVENTILATION: ICD-10-CM

## 2020-04-22 ENCOUNTER — TELEPHONE (OUTPATIENT)
Dept: NEUROLOGY | Facility: CLINIC | Age: 60
End: 2020-04-22

## 2020-04-23 ENCOUNTER — TELEPHONE (OUTPATIENT)
Dept: NEUROLOGY | Facility: CLINIC | Age: 60
End: 2020-04-23

## 2020-04-23 ENCOUNTER — TELEMEDICINE (OUTPATIENT)
Dept: NEUROLOGY | Facility: CLINIC | Age: 60
End: 2020-04-23
Payer: MEDICARE

## 2020-04-23 DIAGNOSIS — F41.9 ANXIETY HYPERVENTILATION: ICD-10-CM

## 2020-04-23 DIAGNOSIS — F32.0 MAJOR DEPRESSIVE DISORDER, SINGLE EPISODE, MILD (HCC): ICD-10-CM

## 2020-04-23 DIAGNOSIS — G25.81 RESTLESS LEGS: ICD-10-CM

## 2020-04-23 DIAGNOSIS — D68.52 HETEROZYGOUS FOR PROTHROMBIN G20210A MUTATION (HCC): ICD-10-CM

## 2020-04-23 DIAGNOSIS — G25.81 RESTLESS LEGS SYNDROME: ICD-10-CM

## 2020-04-23 DIAGNOSIS — F45.8 ANXIETY HYPERVENTILATION: ICD-10-CM

## 2020-04-23 DIAGNOSIS — G20 PARKINSON DISEASE (HCC): Primary | ICD-10-CM

## 2020-04-23 PROCEDURE — 99214 OFFICE O/P EST MOD 30 MIN: CPT | Performed by: PSYCHIATRY & NEUROLOGY

## 2020-04-23 RX ORDER — DULOXETIN HYDROCHLORIDE 60 MG/1
60 CAPSULE, DELAYED RELEASE ORAL DAILY
Qty: 30 CAPSULE | Refills: 8 | Status: SHIPPED | OUTPATIENT
Start: 2020-04-23 | End: 2020-06-18 | Stop reason: SDUPTHER

## 2020-06-15 DIAGNOSIS — F41.9 ANXIETY: ICD-10-CM

## 2020-06-15 DIAGNOSIS — F32.0 MAJOR DEPRESSIVE DISORDER, SINGLE EPISODE, MILD (HCC): ICD-10-CM

## 2020-06-15 DIAGNOSIS — G20 PARKINSON DISEASE (HCC): ICD-10-CM

## 2020-06-15 DIAGNOSIS — F32.0 MAJOR DEPRESSIVE DISORDER, SINGLE EPISODE, MILD (HCC): Primary | ICD-10-CM

## 2020-06-15 DIAGNOSIS — G25.81 RESTLESS LEGS SYNDROME: ICD-10-CM

## 2020-06-15 RX ORDER — PAROXETINE HYDROCHLORIDE 20 MG/1
20 TABLET, FILM COATED ORAL DAILY
Qty: 90 TABLET | Refills: 1 | Status: SHIPPED | OUTPATIENT
Start: 2020-06-15 | End: 2020-07-02 | Stop reason: SDUPTHER

## 2020-06-15 RX ORDER — CLONAZEPAM 0.5 MG/1
0.5 TABLET ORAL EVERY EVENING
Qty: 30 TABLET | Refills: 3 | Status: SHIPPED | OUTPATIENT
Start: 2020-06-15 | End: 2020-11-12 | Stop reason: SDUPTHER

## 2020-06-18 RX ORDER — DULOXETIN HYDROCHLORIDE 60 MG/1
60 CAPSULE, DELAYED RELEASE ORAL DAILY
Qty: 30 CAPSULE | Refills: 2 | Status: SHIPPED | OUTPATIENT
Start: 2020-06-18 | End: 2020-09-23 | Stop reason: SDUPTHER

## 2020-06-26 ENCOUNTER — TELEPHONE (OUTPATIENT)
Dept: NEUROLOGY | Facility: CLINIC | Age: 60
End: 2020-06-26

## 2020-07-02 DIAGNOSIS — F41.9 ANXIETY: ICD-10-CM

## 2020-07-02 RX ORDER — PAROXETINE HYDROCHLORIDE 20 MG/1
20 TABLET, FILM COATED ORAL DAILY
Qty: 90 TABLET | Refills: 1 | Status: SHIPPED | OUTPATIENT
Start: 2020-07-02 | End: 2020-07-07 | Stop reason: SDUPTHER

## 2020-07-02 NOTE — TELEPHONE ENCOUNTER
Medication refill check list    Correct patient? yes   Correct medication name, dose, and pill size? yes   Correct provider? yes   Last and Next appt  scheduled? Yes, last date 06/15/20 & next date 08/26/20   Right pharmacy listed? yes   Correct quantity for 30 or 90 days? yes   Is the patient out of refills? When was it last prescribed? Yes, last date 06/15/20   Directions match what the patient says they are taking?  yes   Enough refills? (none for controlled substances, 1 year for routine medications) yes

## 2020-07-07 ENCOUNTER — OFFICE VISIT (OUTPATIENT)
Dept: FAMILY MEDICINE CLINIC | Facility: CLINIC | Age: 60
End: 2020-07-07
Payer: MEDICARE

## 2020-07-07 VITALS
DIASTOLIC BLOOD PRESSURE: 82 MMHG | BODY MASS INDEX: 24.04 KG/M2 | SYSTOLIC BLOOD PRESSURE: 138 MMHG | HEIGHT: 66 IN | WEIGHT: 149.6 LBS | OXYGEN SATURATION: 96 % | HEART RATE: 95 BPM | TEMPERATURE: 96.9 F

## 2020-07-07 DIAGNOSIS — C50.911 BILATERAL MALIGNANT NEOPLASM OF BREAST IN FEMALE, UNSPECIFIED ESTROGEN RECEPTOR STATUS, UNSPECIFIED SITE OF BREAST (HCC): ICD-10-CM

## 2020-07-07 DIAGNOSIS — G20 PARKINSON DISEASE (HCC): ICD-10-CM

## 2020-07-07 DIAGNOSIS — F41.9 ANXIETY: ICD-10-CM

## 2020-07-07 DIAGNOSIS — C50.912 BILATERAL MALIGNANT NEOPLASM OF BREAST IN FEMALE, UNSPECIFIED ESTROGEN RECEPTOR STATUS, UNSPECIFIED SITE OF BREAST (HCC): ICD-10-CM

## 2020-07-07 DIAGNOSIS — F32.0 MAJOR DEPRESSIVE DISORDER, SINGLE EPISODE, MILD (HCC): ICD-10-CM

## 2020-07-07 DIAGNOSIS — E04.1 RIGHT THYROID NODULE: Primary | ICD-10-CM

## 2020-07-07 PROCEDURE — 3008F BODY MASS INDEX DOCD: CPT | Performed by: FAMILY MEDICINE

## 2020-07-07 PROCEDURE — 99214 OFFICE O/P EST MOD 30 MIN: CPT | Performed by: FAMILY MEDICINE

## 2020-07-07 PROCEDURE — 1036F TOBACCO NON-USER: CPT | Performed by: FAMILY MEDICINE

## 2020-07-07 RX ORDER — PAROXETINE HYDROCHLORIDE 20 MG/1
20 TABLET, FILM COATED ORAL DAILY
Qty: 90 TABLET | Refills: 1 | Status: SHIPPED | OUTPATIENT
Start: 2020-07-07 | End: 2021-01-15 | Stop reason: SDUPTHER

## 2020-07-07 NOTE — ASSESSMENT & PLAN NOTE
Patient did not get u/s done to follow up on nodule seen on CT scan   Now that things are open following covid she will schedule  Follow up as needed

## 2020-07-07 NOTE — PROGRESS NOTES
Adriana Holloway 1960 female MRN: [id number]    Family Medicine Follow-up Visit    ASSESSMENT/PLAN  Problem List Items Addressed This Visit        Endocrine    Right thyroid nodule - Primary     Patient did not get u/s done to follow up on nodule seen on CT scan   Now that things are open following covid she will schedule  Follow up as needed             Nervous and Auditory    Parkinson disease ([facility] [address] )     Continue follow up and medications through Dr Estefany Mcbride, neurology             Other    Breast cancer [facility])    Major depressive disorder, single episode, mild ([facility] [address] )     Continue paxil          Relevant Medications    PARoxetine (PAXIL) 20 mg tablet    Anxiety    Relevant Medications    PARoxetine (PAXIL) 20 mg tablet          Follow up for AWV After Dec 3 or sooner if needed          Future Appointments   Date Time Provider Lillian Powell   8/26/2020  2:25 PM Jeet Hermosillo MD NEURO [facility]   12/7/2020 10:45 AM DO FRED Gutiérrez  P[facility]          SUBJECTIVE  CC: No chief complaint on file  HPI:  Adriana Holloway is a 62 y o  female who presents for check up  Thyroid nodule- due to covid did not get her u/s due to worries about covid  Parkinson- stable on medication, following with dr Len Lepe at home- her daughter is pregnant and is having complications  Reports she has been without her paxil for 2 weeks states neurology wouldn't refill it for her     HPI    Review of Systems   Constitutional: Negative for chills, fatigue and fever  HENT: Negative for congestion, postnasal drip, rhinorrhea and sinus pressure  Eyes: Negative for photophobia and visual disturbance  Respiratory: Negative for cough and shortness of breath  Cardiovascular: Negative for chest pain, palpitations and leg swelling  Gastrointestinal: Negative for abdominal pain, constipation, diarrhea, nausea and vomiting  Genitourinary: Negative for difficulty urinating and dysuria  Musculoskeletal: Negative for arthralgias and myalgias  Skin: Negative for color change and rash  Neurological: Negative for dizziness, weakness, light-headedness and headaches         Historical Information   The patient history was reviewed as follows:    Past Medical History:   Diagnosis Date    Anxiety     [facility] [address] )     bilateral    Depression     Parkinson disease ([facility] )      Past Surgical History:   Procedure Laterality Date    BREAST RECONSTRUCTION Bilateral     GANGLION CYST EXCISION Left     leg    MASTECTOMY Bilateral     TONSILLECTOMY       Family History   Problem Relation Age of Onset    Stroke Mother     Cancer Father     Stroke Brother       Social History   Social History     Substance and Sexual Activity   Alcohol Use Yes    Frequency: Monthly or less    Drinks per session: 1 or 2    Binge frequency: Never     Social History     Substance and Sexual Activity   Drug Use Never     Social History     Tobacco Use   Smoking Status Never Smoker   Smokeless Tobacco Never Used       Medications:     Current Outpatient Medications:     Ascorbic Acid (VITAMIN C) 100 MG tablet, Take 100 mg by mouth daily, Disp: , Rfl:     carbidopa-levodopa (SINEMET)  mg per tablet, Take 1 tablet by mouth 4 (four) times a day, Disp: 120 tablet, Rfl: 2    clonazePAM (KlonoPIN) 0 5 mg tablet, Take 1 tablet (0 5 mg total) by mouth every evening, Disp: 30 tablet, Rfl: 3    cyanocobalamin (VITAMIN B-12) 100 MCG tablet, Take 100 mcg by mouth daily, Disp: , Rfl:     DULoxetine (CYMBALTA) 60 mg delayed release capsule, Take 1 capsule (60 mg total) by mouth daily, Disp: 30 capsule, Rfl: 2    Multiple Vitamins-Minerals (MULTIVITAMIN WITH MINERALS) tablet, Take 1 tablet by mouth daily, Disp: , Rfl:     trihexyphenidyl (ARTANE) 2 mg tablet, Take 2 mg by mouth 3 (three) times a day with meals, Disp: , Rfl:     PARoxetine (PAXIL) 20 mg tablet, Take 1 tablet (20 mg total) by mouth daily, Disp: 90 tablet, Rfl: 1    Zoster Vac Recomb Adjuvanted (SHINGRIX) 50 MCG/0 5ML SUSR, 0 5mL IM for one dose, followed by 0 5mL IM 2-6 months after first dose (Patient not taking: Reported on 3/4/2020), Disp: 1 each, Rfl: 1  No Known Allergies    OBJECTIVE    Vitals:   Vitals:    07/07/20 1550 07/07/20 1624   BP: (!) 148/102 138/82   BP Location: Right arm    Patient Position: Sitting    Cuff Size: Large    Pulse: 95    Temp: (!) 96 9 °F (36 1 °C)    TempSrc: Tympanic    SpO2: 96%    Weight: 67 9 kg (149 lb 9 6 oz)    Height: 5' 6" (1 676 m)            Physical Exam   Constitutional: She is oriented to person, place, and time  She appears well-developed and well-nourished  HENT:   Head: Normocephalic and atraumatic  Mouth/Throat: Oropharynx is clear and moist    Eyes: Pupils are equal, round, and reactive to light  Neck: Normal range of motion  Neck supple  Cardiovascular: Normal rate, regular rhythm and normal heart sounds  Pulmonary/Chest: Effort normal and breath sounds normal  No respiratory distress  She has no wheezes  Abdominal: Soft  Bowel sounds are normal  She exhibits no distension  There is no tenderness  Musculoskeletal: Normal range of motion  She exhibits no edema or tenderness  Neurological: She is alert and oriented to person, place, and time  Skin: Skin is warm and dry  Psychiatric: Her behavior is normal  Her mood appears anxious            Labs:        DO Kalyn    7/7/2020

## 2020-07-16 ENCOUNTER — HOSPITAL ENCOUNTER (OUTPATIENT)
Dept: ULTRASOUND IMAGING | Facility: HOSPITAL | Age: 60
Discharge: HOME | End: 2020-07-16
Payer: MEDICARE

## 2020-07-16 DIAGNOSIS — C50.912 BILATERAL MALIGNANT NEOPLASM OF BREAST IN FEMALE, UNSPECIFIED ESTROGEN RECEPTOR STATUS, UNSPECIFIED SITE OF BREAST (HCC): ICD-10-CM

## 2020-07-16 DIAGNOSIS — E04.1 RIGHT THYROID NODULE: ICD-10-CM

## 2020-07-16 DIAGNOSIS — C50.911 BILATERAL MALIGNANT NEOPLASM OF BREAST IN FEMALE, UNSPECIFIED ESTROGEN RECEPTOR STATUS, UNSPECIFIED SITE OF BREAST (HCC): ICD-10-CM

## 2020-07-16 PROCEDURE — 76536 US EXAM OF HEAD AND NECK: CPT

## 2020-07-28 DIAGNOSIS — G20 PARKINSON DISEASE (HCC): Primary | ICD-10-CM

## 2020-07-28 NOTE — TELEPHONE ENCOUNTER
Medication refill check list    Correct patient? yes   Correct medication name, dose, and pill size? yes   Correct provider? yes   Last and Next appt  scheduled? Yes, last date 4/23/20 & next date 8/26/20   Right pharmacy listed? yes   Correct quantity for 30 or 90 days? yes   Is the patient out of refills? When was it last prescribed? Yes   Directions match what the patient says they are taking?  yes   Enough refills? (none for controlled substances, 1 year for routine medications) no

## 2020-07-29 RX ORDER — TRIHEXYPHENIDYL HYDROCHLORIDE 2 MG/1
2 TABLET ORAL
Qty: 90 TABLET | Refills: 1 | Status: SHIPPED | OUTPATIENT
Start: 2020-07-29 | End: 2020-09-23 | Stop reason: SDUPTHER

## 2020-07-31 ENCOUNTER — TELEMEDICINE (OUTPATIENT)
Dept: FAMILY MEDICINE CLINIC | Facility: CLINIC | Age: 60
End: 2020-07-31
Payer: MEDICARE

## 2020-07-31 DIAGNOSIS — E04.1 RIGHT THYROID NODULE: Primary | ICD-10-CM

## 2020-07-31 PROCEDURE — 99442 PR PHYS/QHP TELEPHONE EVALUATION 11-20 MIN: CPT | Performed by: FAMILY MEDICINE

## 2020-07-31 PROCEDURE — 1036F TOBACCO NON-USER: CPT | Performed by: FAMILY MEDICINE

## 2020-07-31 NOTE — PROGRESS NOTES
Virtual Regular Visit      Assessment/Plan:    Problem List Items Addressed This Visit        Endocrine    Right thyroid nodule - Primary        Incidentally thyroid nodule seen on CT neck when she was in the ER a few months ago  We followed up with a thyroid u/s and the report was reassuring  She needs continued oncology follow up given her history of breat cancer  Patient is getting her records from [place] and will be est with oncology in our area  We will continue to follow along closely  Reason for visit is   Chief Complaint   Patient presents with    Virtual Regular Visit        Encounter provider Marcus Turner DO    Provider located at [address] [place] [id number] [phone]      Recent Visits  No visits were found meeting these conditions  Showing recent visits within past 7 days and meeting all other requirements     Today's Visits  Date Type Provider Dept   07/31/20 Telemedicine Jaspal Claros DO [facility]   Showing today's visits and meeting all other requirements     Future Appointments  No visits were found meeting these conditions  Showing future appointments within next 150 days and meeting all other requirements        The patient was identified by name and date of birth  Abigail Rojas was informed that this is a telemedicine visit and that the visit is being conducted through [facility] and patient was informed that this is a secure, HIPAA-compliant platform  She agrees to proceed     My office door was closed  No one else was in the room  She acknowledged consent and understanding of privacy and security of the video platform  The patient has agreed to participate and understands they can discontinue the visit at any time  Patient is aware this is a billable service  Subjective  Abigail Rojas is a 62 y o  female being seen for video visit for follow up     She needs to review her thyroid u/s report    HPI     Past Medical History:   Diagnosis Date    Anxiety     [facility] cancer [facility] [address] )     bilateral    Depression     Parkinson disease ([facility] [address] )        Past Surgical History:   Procedure Laterality Date    BREAST RECONSTRUCTION Bilateral     GANGLION CYST EXCISION Left     leg    MASTECTOMY Bilateral     TONSILLECTOMY         Current Outpatient Medications   Medication Sig Dispense Refill    Ascorbic Acid (VITAMIN C) 100 MG tablet Take 100 mg by mouth daily      carbidopa-levodopa (SINEMET)  mg per tablet Take 1 tablet by mouth 4 (four) times a day 120 tablet 2    clonazePAM (KlonoPIN) 0 5 mg tablet Take 1 tablet (0 5 mg total) by mouth every evening 30 tablet 3    cyanocobalamin (VITAMIN B-12) 100 MCG tablet Take 100 mcg by mouth daily      DULoxetine (CYMBALTA) 60 mg delayed release capsule Take 1 capsule (60 mg total) by mouth daily 30 capsule 2    Multiple Vitamins-Minerals (MULTIVITAMIN WITH MINERALS) tablet Take 1 tablet by mouth daily      PARoxetine (PAXIL) 20 mg tablet Take 1 tablet (20 mg total) by mouth daily 90 tablet 1    trihexyphenidyl (ARTANE) 2 mg tablet Take 1 tablet (2 mg total) by mouth 3 (three) times a day with meals 90 tablet 1    Zoster Vac Recomb Adjuvanted (SHINGRIX) 50 MCG/0 5ML SUSR 0 5mL IM for one dose, followed by 0 5mL IM 2-6 months after first dose (Patient not taking: Reported on 3/4/2020) 1 each 1     No current facility-administered medications for this visit  No Known Allergies    Review of Systems   Constitutional: Negative for chills, fatigue and fever  HENT: Negative for congestion, postnasal drip, rhinorrhea and sinus pressure  Eyes: Negative for photophobia and visual disturbance  Respiratory: Negative for cough and shortness of breath  Cardiovascular: Negative for chest pain, palpitations and leg swelling  Gastrointestinal: Negative for abdominal pain, constipation, diarrhea, nausea and vomiting     Genitourinary: Negative for difficulty urinating and dysuria  Musculoskeletal: Negative for arthralgias and myalgias  Skin: Negative for color change and rash  Neurological: Negative for dizziness, weakness, light-headedness and headaches  Video Exam    There were no vitals filed for this visit  Physical Exam   It was my intent to perform this visit via video technology but the patient was not able to do a video connection so the visit was completed via audio telephone only  I spent 15 minutes directly with the patient during this visit      VIRTUAL VISIT DISCLAIMER    Donavan Last acknowledges that she has consented to an online visit or consultation  She understands that the online visit is based solely on information provided by her, and that, in the absence of a face-to-face physical evaluation by the physician, the diagnosis she receives is both limited and provisional in terms of accuracy and completeness  This is not intended to replace a full medical face-to-face evaluation by the physician  Donavan Last understands and accepts these terms

## 2020-09-23 DIAGNOSIS — F32.0 MAJOR DEPRESSIVE DISORDER, SINGLE EPISODE, MILD (HCC): ICD-10-CM

## 2020-09-23 DIAGNOSIS — G20 PARKINSON DISEASE (HCC): ICD-10-CM

## 2020-09-23 RX ORDER — DULOXETIN HYDROCHLORIDE 60 MG/1
60 CAPSULE, DELAYED RELEASE ORAL DAILY
Qty: 30 CAPSULE | Refills: 2 | Status: SHIPPED | OUTPATIENT
Start: 2020-09-23 | End: 2020-12-28 | Stop reason: SDUPTHER

## 2020-09-23 RX ORDER — TRIHEXYPHENIDYL HYDROCHLORIDE 2 MG/1
2 TABLET ORAL
Qty: 90 TABLET | Refills: 1 | Status: SHIPPED | OUTPATIENT
Start: 2020-09-23 | End: 2020-12-09 | Stop reason: SDUPTHER

## 2020-09-23 NOTE — TELEPHONE ENCOUNTER
Medication refill check list    Correct patient? yes   Correct medication name, dose, and pill size? yes   Correct provider? yes   Last and Next appt  scheduled? Yes, last date 04/23/20 & next date 11/23/20   Right pharmacy listed? yes   Correct quantity for 30 or 90 days? yes   Is the patient out of refills? When was it last prescribed? Yes, last date 06/18/20 & 07/29/20   Directions match what the patient says they are taking?  yes   Enough refills? (none for controlled substances, 1 year for routine medications) yes

## 2020-10-30 ENCOUNTER — TELEPHONE (OUTPATIENT)
Dept: NEUROLOGY | Facility: CLINIC | Age: 60
End: 2020-10-30

## 2020-11-11 ENCOUNTER — TELEPHONE (OUTPATIENT)
Dept: NEUROLOGY | Facility: CLINIC | Age: 60
End: 2020-11-11

## 2020-11-12 DIAGNOSIS — G25.81 RESTLESS LEGS SYNDROME: ICD-10-CM

## 2020-11-12 DIAGNOSIS — F41.9 ANXIETY: ICD-10-CM

## 2020-11-12 RX ORDER — CLONAZEPAM 0.5 MG/1
0.5 TABLET ORAL EVERY EVENING
Qty: 30 TABLET | Refills: 3 | Status: SHIPPED | OUTPATIENT
Start: 2020-11-12 | End: 2021-03-19 | Stop reason: SDUPTHER

## 2020-11-23 ENCOUNTER — OFFICE VISIT (OUTPATIENT)
Dept: DERMATOLOGY | Facility: CLINIC | Age: 60
End: 2020-11-23
Payer: MEDICARE

## 2020-11-23 VITALS — TEMPERATURE: 97 F | WEIGHT: 158 LBS | HEIGHT: 66 IN | BODY MASS INDEX: 25.39 KG/M2

## 2020-11-23 DIAGNOSIS — L82.1 SEBORRHEIC KERATOSIS: ICD-10-CM

## 2020-11-23 DIAGNOSIS — D48.5 NEOPLASM OF UNCERTAIN BEHAVIOR OF SKIN: Primary | ICD-10-CM

## 2020-11-23 DIAGNOSIS — L57.0 ACTINIC KERATOSIS: ICD-10-CM

## 2020-11-23 PROCEDURE — 88305 TISSUE EXAM BY PATHOLOGIST: CPT | Performed by: PATHOLOGY

## 2020-11-23 PROCEDURE — 11102 TANGNTL BX SKIN SINGLE LES: CPT | Performed by: DERMATOLOGY

## 2020-11-23 PROCEDURE — 99205 OFFICE O/P NEW HI 60 MIN: CPT | Performed by: DERMATOLOGY

## 2020-11-23 PROCEDURE — 17000 DESTRUCT PREMALG LESION: CPT | Performed by: DERMATOLOGY

## 2020-11-30 ENCOUNTER — TELEPHONE (OUTPATIENT)
Dept: FAMILY MEDICINE CLINIC | Facility: CLINIC | Age: 60
End: 2020-11-30

## 2020-12-03 LAB — EXT SARS-COV-2: NOT DETECTED

## 2020-12-07 ENCOUNTER — TELEMEDICINE (OUTPATIENT)
Dept: FAMILY MEDICINE CLINIC | Facility: CLINIC | Age: 60
End: 2020-12-07
Payer: MEDICARE

## 2020-12-07 VITALS
SYSTOLIC BLOOD PRESSURE: 142 MMHG | HEIGHT: 66 IN | BODY MASS INDEX: 25.23 KG/M2 | HEART RATE: 78 BPM | WEIGHT: 157 LBS | TEMPERATURE: 97.9 F | DIASTOLIC BLOOD PRESSURE: 80 MMHG

## 2020-12-07 DIAGNOSIS — Z12.12 SCREENING FOR COLORECTAL CANCER: ICD-10-CM

## 2020-12-07 DIAGNOSIS — D64.9 ANEMIA, UNSPECIFIED TYPE: ICD-10-CM

## 2020-12-07 DIAGNOSIS — E78.2 MIXED HYPERLIPIDEMIA: ICD-10-CM

## 2020-12-07 DIAGNOSIS — Z13.1 SCREENING FOR DIABETES MELLITUS: ICD-10-CM

## 2020-12-07 DIAGNOSIS — G20 PARKINSON DISEASE (HCC): ICD-10-CM

## 2020-12-07 DIAGNOSIS — Z23 NEED FOR SHINGLES VACCINE: ICD-10-CM

## 2020-12-07 DIAGNOSIS — Z11.4 SCREENING FOR HIV (HUMAN IMMUNODEFICIENCY VIRUS): ICD-10-CM

## 2020-12-07 DIAGNOSIS — D72.9 ABNORMAL WHITE BLOOD CELL (WBC): ICD-10-CM

## 2020-12-07 DIAGNOSIS — C50.912 BILATERAL MALIGNANT NEOPLASM OF BREAST IN FEMALE, UNSPECIFIED ESTROGEN RECEPTOR STATUS, UNSPECIFIED SITE OF BREAST (HCC): ICD-10-CM

## 2020-12-07 DIAGNOSIS — Z13.6 SCREENING FOR CARDIOVASCULAR CONDITION: ICD-10-CM

## 2020-12-07 DIAGNOSIS — E04.1 RIGHT THYROID NODULE: ICD-10-CM

## 2020-12-07 DIAGNOSIS — F32.0 MAJOR DEPRESSIVE DISORDER, SINGLE EPISODE, MILD (HCC): ICD-10-CM

## 2020-12-07 DIAGNOSIS — Z00.00 MEDICARE ANNUAL WELLNESS VISIT, SUBSEQUENT: Primary | ICD-10-CM

## 2020-12-07 DIAGNOSIS — Z12.11 SCREENING FOR COLORECTAL CANCER: ICD-10-CM

## 2020-12-07 DIAGNOSIS — C50.911 BILATERAL MALIGNANT NEOPLASM OF BREAST IN FEMALE, UNSPECIFIED ESTROGEN RECEPTOR STATUS, UNSPECIFIED SITE OF BREAST (HCC): ICD-10-CM

## 2020-12-07 DIAGNOSIS — Z23 NEED FOR IMMUNIZATION AGAINST INFLUENZA: ICD-10-CM

## 2020-12-07 PROCEDURE — 90682 RIV4 VACC RECOMBINANT DNA IM: CPT | Performed by: FAMILY MEDICINE

## 2020-12-07 PROCEDURE — G0439 PPPS, SUBSEQ VISIT: HCPCS | Performed by: FAMILY MEDICINE

## 2020-12-07 PROCEDURE — G0008 ADMIN INFLUENZA VIRUS VAC: HCPCS | Performed by: FAMILY MEDICINE

## 2020-12-07 RX ORDER — VITAMIN B COMPLEX
1 CAPSULE ORAL DAILY
COMMUNITY

## 2020-12-07 RX ORDER — ZOSTER VACCINE RECOMBINANT, ADJUVANTED 50 MCG/0.5
0.5 KIT INTRAMUSCULAR ONCE
Qty: 1 EACH | Refills: 1 | Status: SHIPPED | OUTPATIENT
Start: 2020-12-07 | End: 2020-12-07

## 2020-12-08 ENCOUNTER — TELEPHONE (OUTPATIENT)
Dept: SURGICAL ONCOLOGY | Facility: CLINIC | Age: 60
End: 2020-12-08

## 2020-12-09 DIAGNOSIS — G20 PARKINSON DISEASE (HCC): ICD-10-CM

## 2020-12-09 RX ORDER — TRIHEXYPHENIDYL HYDROCHLORIDE 2 MG/1
2 TABLET ORAL
Qty: 90 TABLET | Refills: 1 | Status: SHIPPED | OUTPATIENT
Start: 2020-12-09 | End: 2021-02-11 | Stop reason: SDUPTHER

## 2020-12-14 ENCOUNTER — PROCEDURE VISIT (OUTPATIENT)
Dept: DERMATOLOGY | Facility: CLINIC | Age: 60
End: 2020-12-14
Payer: MEDICARE

## 2020-12-14 VITALS — BODY MASS INDEX: 25.23 KG/M2 | HEIGHT: 66 IN | TEMPERATURE: 99.1 F | WEIGHT: 157 LBS

## 2020-12-14 DIAGNOSIS — D09.9 SQUAMOUS CELL CARCINOMA IN SITU (SCCIS): Primary | ICD-10-CM

## 2020-12-14 PROCEDURE — 88305 TISSUE EXAM BY PATHOLOGIST: CPT | Performed by: STUDENT IN AN ORGANIZED HEALTH CARE EDUCATION/TRAINING PROGRAM

## 2020-12-14 PROCEDURE — 12032 INTMD RPR S/A/T/EXT 2.6-7.5: CPT | Performed by: DERMATOLOGY

## 2020-12-14 PROCEDURE — 11602 EXC TR-EXT MAL+MARG 1.1-2 CM: CPT | Performed by: DERMATOLOGY

## 2020-12-21 ENCOUNTER — TELEPHONE (OUTPATIENT)
Dept: FAMILY MEDICINE CLINIC | Facility: CLINIC | Age: 60
End: 2020-12-21

## 2020-12-28 DIAGNOSIS — F32.0 MAJOR DEPRESSIVE DISORDER, SINGLE EPISODE, MILD (HCC): ICD-10-CM

## 2020-12-28 RX ORDER — DULOXETIN HYDROCHLORIDE 60 MG/1
60 CAPSULE, DELAYED RELEASE ORAL DAILY
Qty: 30 CAPSULE | Refills: 2 | Status: SHIPPED | OUTPATIENT
Start: 2020-12-28 | End: 2021-04-12 | Stop reason: SDUPTHER

## 2020-12-29 ENCOUNTER — OFFICE VISIT (OUTPATIENT)
Dept: DERMATOLOGY | Facility: CLINIC | Age: 60
End: 2020-12-29

## 2020-12-29 VITALS — WEIGHT: 157 LBS | TEMPERATURE: 98 F | BODY MASS INDEX: 25.23 KG/M2 | HEIGHT: 66 IN

## 2020-12-29 DIAGNOSIS — Z48.02 ENCOUNTER FOR REMOVAL OF SUTURES: Primary | ICD-10-CM

## 2020-12-29 PROCEDURE — RECHECK: Performed by: STUDENT IN AN ORGANIZED HEALTH CARE EDUCATION/TRAINING PROGRAM

## 2020-12-30 ENCOUNTER — TELEPHONE (OUTPATIENT)
Dept: DERMATOLOGY | Facility: CLINIC | Age: 60
End: 2020-12-30

## 2020-12-30 NOTE — TELEPHONE ENCOUNTER
Patient called regarding a chemotherapy cream she would like to talk to the doctor about      Please advise

## 2021-01-04 ENCOUNTER — TELEPHONE (OUTPATIENT)
Dept: DERMATOLOGY | Facility: CLINIC | Age: 61
End: 2021-01-04

## 2021-01-04 DIAGNOSIS — G20 PARKINSON DISEASE (HCC): ICD-10-CM

## 2021-01-04 NOTE — TELEPHONE ENCOUNTER
Telephone call to patient  Spoke to patient in regards scheduling MOHS   Pt scheduled with Dr Jenny Regan 01/06/2020

## 2021-01-04 NOTE — TELEPHONE ENCOUNTER
Please schedule for Mohs on right clavicle  I did an exicison but margins are not clear   Patient understands and ok with mohs

## 2021-01-04 NOTE — TELEPHONE ENCOUNTER
Patient called requesting refill of Carbidopa-levodopa  mg QID for 90 day supply to [facility]  Last ordered on 9/23/2020       Last visit 4/23/2020  Next visit 1/14/2021

## 2021-01-06 ENCOUNTER — PROCEDURE VISIT (OUTPATIENT)
Dept: DERMATOLOGY | Facility: CLINIC | Age: 61
End: 2021-01-06
Payer: MEDICARE

## 2021-01-06 VITALS
TEMPERATURE: 96.5 F | HEART RATE: 100 BPM | DIASTOLIC BLOOD PRESSURE: 82 MMHG | BODY MASS INDEX: 26 KG/M2 | SYSTOLIC BLOOD PRESSURE: 132 MMHG | WEIGHT: 161.8 LBS | HEIGHT: 66 IN

## 2021-01-06 DIAGNOSIS — D09.9 SQUAMOUS CELL CARCINOMA IN SITU (SCCIS): Primary | ICD-10-CM

## 2021-01-06 PROCEDURE — 11606 EXC TR-EXT MAL+MARG >4 CM: CPT | Performed by: STUDENT IN AN ORGANIZED HEALTH CARE EDUCATION/TRAINING PROGRAM

## 2021-01-06 PROCEDURE — 12032 INTMD RPR S/A/T/EXT 2.6-7.5: CPT | Performed by: STUDENT IN AN ORGANIZED HEALTH CARE EDUCATION/TRAINING PROGRAM

## 2021-01-06 NOTE — PROGRESS NOTES
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      Past Medical History:   Diagnosis Date    Anxiety     Breast cancer ([facility] [address] )     bilateral    Depression     Parkinson disease ([facility] )     Squamous cell skin cancer 2020    SCCIS right clavicle       Past Surgical History:   Procedure Laterality Date    BREAST RECONSTRUCTION Bilateral     GANGLION CYST EXCISION Left     leg    MASTECTOMY Bilateral     MOHS SURGERY  2021    SCCIS right clavicle, Dr [facility]           Current Outpatient Medications:     Ascorbic Acid (VITAMIN C) 100 MG tablet, Take 1,000 mg by mouth daily , Disp: , Rfl:     b complex vitamins capsule, Take 1 capsule by mouth daily, Disp: , Rfl:     carbidopa-levodopa (SINEMET)  mg per tablet, Take 1 tablet by mouth 4 (four) times a day, Disp: 120 tablet, Rfl: 2    clonazePAM (KlonoPIN) 0 5 mg tablet, Take 1 tablet (0 5 mg total) by mouth every evening, Disp: 30 tablet, Rfl: 3    DULoxetine (CYMBALTA) 60 mg delayed release capsule, Take 1 capsule (60 mg total) by mouth daily, Disp: 30 capsule, Rfl: 2    Multiple Vitamins-Minerals (MULTIVITAMIN WITH MINERALS) tablet, Take 1 tablet by mouth daily, Disp: , Rfl:     PARoxetine (PAXIL) 20 mg tablet, Take 1 tablet (20 mg total) by mouth daily, Disp: 90 tablet, Rfl: 1    trihexyphenidyl (ARTANE) 2 mg tablet, Take 1 tablet (2 mg total) by mouth 3 (three) times a day with meals, Disp: 90 tablet, Rfl: 1    Zoster Vac Recomb Adjuvanted (SHINGRIX) 50 MCG/0 5ML SUSR, 0 5mL IM for one dose, followed by 0 5mL IM 2-6 months after first dose (Patient not taking: Reported on 3/4/2020), Disp: 1 each, Rfl: 1    No Known Allergies    Physical Exam:   Vitals:    21 0811   BP: 132/82   Pulse: 100   Temp: (!) 96 5 °F (35 8 °C)     General: Awake, Alert, Oriented x 3, Mood and affect appropriate  Respiratory: Respirations even and unlabored  Cardiovascular: Peripheral pulses intact; no edema  Musculoskeletal Exam: N/A    Assessment: 6cm x 0 2cm linear scar; BX proven SCCIS  Plan: MOHS    MOHS Procedure Timeout      Most Recent Value   Timeout:  0815   Patient Identity Verified:  Yes   Correct Site Verified:  Yes   Correct Procedure Verified:  Yes          MOHS Diagnosis/Indication/Location/ID      Most Recent Value   Pathology Type  Squamous cell carcinoma   Anatomic Site  -- [Right clavicle ]   Indications for MOHS  incomplete tumor removal   MOHS ID  [id number]          MOHS Site/Accession/Pre-Post      Most Recent Value   Original Site Identified (as submitted by referring clinician)  Photo   Biopsy Accession/Specimen # (as submitted by referring clincian)  [id number]   Pre-MOHS Size Length (cm)  6   Pre-MOHS Size Width (cm)  0 2   Post-MOHS Size-Length (cm)  6 4   Post MOHS Size-Width (cm)  0 9   Repair Type  Intermediate layered closure   Suture Type  Vicryl (deep), Monocryl (running subcuticular)   Monocryl Plus Suture Size  3   Vicryl Suture Size  4   Final repair length (cm):  7   Anesthetic Used  1% Lidocaine with epinephrine Louis Peterson bicarb ]          MOHS Tumor Stage 1 Information      Most Recent Value   Tissue Sections (blocks)  2   Microscopic Exam Section 1:  No tumor identified in section  Microscopic Exam Section 2:  No tumor identified in section  Tumor Clear After Stage I? Yes          Patient identified, procedure verified, site identified and verified  Time out completed  Surgical removal of the lesion discussed with the patient (risks and benefits, including possibility of scarring, infection, recurrence or potential for further treatment)  I have specifically identified the site with the patient   I have discussed the fact that the patient will have a scar after the procedure regardless of granulation or repair with sutures  I have discussed that the repair options can range from granulation in some cases to linear or curvilinear closures to larger flaps or grafts  There are sometimes flaps or grafts used that require multiples stages of surgery and will not be completed today, rather be completed over a series of appointments  I have discussed that occasionally due to location, size or depth of the lesion I may recommend consultation with and transfer of care for further removal or the reconstruction to another provider such as ophthalmology surgery, plastic surgery, ENT surgery, or surgical oncology  There are cases in which other testing such as imaging with MRI or CT scan or testing of lymph nodes is recommended because of the nature/depth/location of tumor seen during the removal  There is a risk of injury to nerves causing temporary or permanent numbness or the inability to move muscles full such as the inability to lift eyebrows  Questions answered and verbal and written consent was obtained  With the patient in the supine position and under adequate local anesthesia with 1% lidocaine with epinephrine 1:200,000, the defect was scrubbed with Hibiclens  Sterile drapes were placed from the sterile tray  Because of the location of the surgical defect, an intermediate closure was judged to give the best possible cosmetic and functional result  The edges of the defect were carefully debrided removing any dead or coagulated tissue  Hemostasis was obtained by pinpoint electrocoagulation  Careful planning of removal of redundant tissue at either end of the defect was drawn out so that the suture lines would fall in the optimal orientation with regard to the relaxed skin tension lines  These were then removed with a #15 blade scalpel  Estimated blood loss was less than 5 mL  The patient tolerated the procedure well    The wound was dressed with petrolatum, a non-stick pad, and a compression dressing  Marciana Mcardle, MD served as the surgeon and pathologist during the procedure  Postoperative care: Wound care discussed at length  I urged the patient to call us if any problems or question should arise  Complications: none  Post-op medications: none  Patient condition after procedure: stable  Discharge plans: Plan for return to clinic for next scheduled appointment with general dermatology    No sutur removal required                       Scribe Attestation    I,:  Keiry Donovan RN am acting as a scribe while in the presence of the attending physician :       I,:  Marciana Mcardle, MD personally performed the services described in this documentation    as scribed in my presence :

## 2021-01-06 NOTE — PATIENT INSTRUCTIONS
Mohs Microscopic Surgery After Care    WOUND CARE AFTER SURGERY:    1  Do NOT to remove the pressure bandage for 48 hours  Keep the area clean and dry while this bandage is on  2  After removing the bandage for the first time, gently clean the area with soap and water  If the bandage is difficult to remove, getting the bandage wet in the shower will sometimes help soften the adhesive and allow it to be removed more easily  3  You will now need to cleanse this area daily in the shower with gentle soap  There is no need to scrub the area  You will need to apply plain Vaseline ointment (this is over the counter and not a prescription) to the site for up to 2 weeks followed by a clean appropriately sized bandage to area  Non stick dressing and paper tape (or Hypafix) are recommended for sensitive skin but a bandaid is fine if it covers the area well  4  All your stitches will dissolve over the next two weeks  You will need to keep these moist with Vaseline and covered with a bandage over the next 2 weeks for them to dissolve appropriately  RESTRICTIONS:     For two DAYS:   - You will need to take it very easy as this time is highest risk for bleeding  Being a "couch potato" during these two days is generally recommended  - For surgeries on the face/neck/scalp: Avoid leaning down to pick things up off the floor as this brings blood up to your head  Instead, squat down to pick things up  For two WEEKS:   - No heavy lifting (anything greater than 10 pounds)   - You can start to do slow, gentle activities such as slow walking but nothing to increase your heart rate and blood pressure too much (such as cardiovascular exercise)  It is important to take it easy as there is still a risk for bleeding and a high risk popping of stitches open during this time  If we did surgery around your nose: No blowing your nose as this puts you at higher risk of popping stitches durign this time   Instead dab under your nose with a tissue or use a Q-tip inside your nose  If we did surgery on the skin above or bellow your lip or your lip itself: No sipping from straws as this uses a lot of the muscles around your mouth and increases the risk of popping stitches during this time  MANAGING YOUR PAIN AFTER SURGERY     You can expect to have some pain after surgery  This is normal  The pain is typically worse the first two days after surgery, and quickly begins to get better  The best strategy for controlling your pain after surgery is around the clock pain control  You can take over the counter Acetaminophen (Tylenol) for discomfort, if no contraindications  If you are taking this at the maximum dose, you can alternate this with Motrin (ibuprofen or Advil) as well  Alternating these medications with each other allows you to maximize your pain control  In addition to Tylenol and Motrin, you can use heating pads or ice packs on your incisions to help reduce your pain  How will I alternate your regular strength over-the-counter pain medication? You will take a dose of pain medication every three hours   Start by taking 650 mg of Tylenol (2 pills of 325 mg)   3 hours later take 600 mg of Motrin (3 pills of 200 mg)   3 hours after taking the Motrin take 650 mg of Tylenol   3 hours after that take 600 mg of Motrin  See example - if your first dose of Tylenol is at 12:00 PM     12:00 PM  Tylenol 650 mg (2 pills of 325 mg)    3:00 PM  Motrin 600 mg (3 pills of 200 mg)    6:00 PM  Tylenol 650 mg (2 pills of 325 mg)    9:00 PM  Motrin 600 mg (3 pills of 200 mg)    Continue alternating every 3 hours      Important:   Do not take more than 4000mg of Tylenol or 3200mg of Motrin in a 24-hour period  What if I still have pain? If you have pain that is not controlled with the over-the-counter pain medications (Tylenol and Motrin or Advil), don't hesitate to call our staff using the number provided   We will help make sure you are managing your pain in the best way possible, and if necessary, we can provide a prescription for additional pain medication  CALL OUR OFFICE IMMEDIATELY FOR ANY SIGNS OF INFECTION:    This includes fever, chills, increased redness, warmth, tenderness, severe discomfort/pain, or pus or foul smell coming from the wound  [facility] directly at [phone] (SKIN)    IF BLEEDING IS NOTICED:    Place a clean cloth over the area and apply firm pressure for thirty minutes  Check the wound ONLY after 30 minutes of direct pressure; do not cheat and sneak a peak, as that does not count  If bleeding persists after 30 minutes of legitimate direct pressure, then try one more round of direct pressure to the area  Should the bleeding become heavier or not stop after the second attempt, call [facility] directly at [phone] (SKIN) or, if after hours, go to your nearest Emergency Room or Urgent Care

## 2021-01-12 ENCOUNTER — TELEPHONE (OUTPATIENT)
Dept: SURGICAL ONCOLOGY | Facility: CLINIC | Age: 61
End: 2021-01-12

## 2021-01-12 NOTE — TELEPHONE ENCOUNTER
Called pt to let her know that as of today 1/12/21, we still have not received any records from Dr Stephen Dhillon office  She was interested in having a consult with Dr Aye Patterson for a hx of breast cancer  Intake in chart

## 2021-01-13 ENCOUNTER — TELEPHONE (OUTPATIENT)
Dept: NEUROLOGY | Facility: CLINIC | Age: 61
End: 2021-01-13

## 2021-01-13 NOTE — TELEPHONE ENCOUNTER
Called patient to confirm appt with Dr Yahaira Chavarria for 1/13/21  Patient is in [address] and rescheduled for first available attached to [employer] [facility]  [facility]   Mailed out appt reminder card

## 2021-01-15 DIAGNOSIS — F41.9 ANXIETY: ICD-10-CM

## 2021-01-15 RX ORDER — PAROXETINE HYDROCHLORIDE 20 MG/1
20 TABLET, FILM COATED ORAL DAILY
Qty: 90 TABLET | Refills: 1 | Status: SHIPPED | OUTPATIENT
Start: 2021-01-15 | End: 2021-07-15 | Stop reason: SDUPTHER

## 2021-02-11 DIAGNOSIS — G20 PARKINSON DISEASE (HCC): ICD-10-CM

## 2021-02-11 RX ORDER — TRIHEXYPHENIDYL HYDROCHLORIDE 2 MG/1
2 TABLET ORAL
Qty: 90 TABLET | Refills: 1 | Status: SHIPPED | OUTPATIENT
Start: 2021-02-11 | End: 2021-04-01 | Stop reason: SDUPTHER

## 2021-02-11 NOTE — TELEPHONE ENCOUNTER
Medication refill check list    Correct patient? yes   Correct medication name, dose, and pill size? yes   Correct provider? yes   Last and Next appt  scheduled? Yes, last date 04/23/20 & next date 04/01/21   Right pharmacy listed? yes   Correct quantity for 30 or 90 days? yes   Is the patient out of refills? When was it last prescribed? Yes, last date 12/9/20   Directions match what the patient says they are taking?  yes   Enough refills? (none for controlled substances, 1 year for routine medications) yes

## 2021-02-19 ENCOUNTER — TELEPHONE (OUTPATIENT)
Dept: FAMILY MEDICINE CLINIC | Facility: CLINIC | Age: 61
End: 2021-02-19

## 2021-03-05 ENCOUNTER — TELEPHONE (OUTPATIENT)
Dept: DERMATOLOGY | Facility: CLINIC | Age: 61
End: 2021-03-05

## 2021-03-19 DIAGNOSIS — G25.81 RESTLESS LEGS SYNDROME: ICD-10-CM

## 2021-03-19 DIAGNOSIS — F41.9 ANXIETY: ICD-10-CM

## 2021-03-19 NOTE — TELEPHONE ENCOUNTER
Pt left a voicemail message today at 1:44 pm requesting klonopin refill be sent to [facility] in [place]  Rx entered   Pls review and sign off      thanks

## 2021-03-22 RX ORDER — CLONAZEPAM 0.5 MG/1
0.5 TABLET ORAL EVERY EVENING
Qty: 30 TABLET | Refills: 3 | Status: SHIPPED | OUTPATIENT
Start: 2021-03-22 | End: 2021-07-26 | Stop reason: SDUPTHER

## 2021-04-01 ENCOUNTER — OFFICE VISIT (OUTPATIENT)
Dept: NEUROLOGY | Facility: CLINIC | Age: 61
End: 2021-04-01
Payer: MEDICARE

## 2021-04-01 VITALS
DIASTOLIC BLOOD PRESSURE: 72 MMHG | SYSTOLIC BLOOD PRESSURE: 128 MMHG | BODY MASS INDEX: 26.02 KG/M2 | WEIGHT: 161.2 LBS | HEART RATE: 94 BPM

## 2021-04-01 DIAGNOSIS — G20 PARKINSON DISEASE (HCC): Primary | ICD-10-CM

## 2021-04-01 PROCEDURE — 99214 OFFICE O/P EST MOD 30 MIN: CPT | Performed by: PSYCHIATRY & NEUROLOGY

## 2021-04-01 RX ORDER — AMOXICILLIN 500 MG/1
CAPSULE ORAL
COMMUNITY
Start: 2021-03-31 | End: 2022-01-18 | Stop reason: HOSPADM

## 2021-04-01 RX ORDER — TRIHEXYPHENIDYL HYDROCHLORIDE 2 MG/1
2 TABLET ORAL
Qty: 270 TABLET | Refills: 2 | Status: SHIPPED | OUTPATIENT
Start: 2021-04-01 | End: 2022-01-27

## 2021-04-01 NOTE — ASSESSMENT & PLAN NOTE
Parkinson's disease with variable off periods which appear to be related to stress  Dyskinesia appear decreased  She has left foot dystonia  No clear pattern to off periods so will continue on Sinemet to 1 tab qid and Artane  We may consider decreasing Artane in the future if dry mouth continues and if she continues to have cognitive changes  Her family has noted changes on cognition but this is not interfering with daily activities  [facility]  She would benefit from Big therpay  Referral made

## 2021-04-01 NOTE — PROGRESS NOTES
Review of Systems   Constitutional: Positive for fatigue (Between 12-2 she gets very tired)  Negative for appetite change and fever  HENT: Negative  Negative for hearing loss, tinnitus, trouble swallowing and voice change  Eyes: Negative  Negative for photophobia and pain  Respiratory: Negative  Negative for shortness of breath  Cardiovascular: Negative  Negative for palpitations  Gastrointestinal: Negative  Negative for nausea and vomiting  Endocrine: Negative  Negative for cold intolerance  Genitourinary: Negative  Negative for dysuria, frequency and urgency  Musculoskeletal: Positive for myalgias (Toes tend to lock up and freeze on her )  Negative for neck pain  More Unstable than usual     Skin: Negative  Negative for rash  Allergic/Immunologic: Negative  Neurological: Positive for dizziness (Sometimes) and weakness (legs)  Negative for tremors, seizures, syncope, facial asymmetry, speech difficulty, light-headedness, numbness and headaches  Breakthrough shaking when she gets stressed   Hematological: Negative  Does not bruise/bleed easily  Psychiatric/Behavioral: Negative for confusion, hallucinations and sleep disturbance  Some forgetfulness     All other systems reviewed and are negative

## 2021-04-01 NOTE — PATIENT INSTRUCTIONS
Parkinson's disease with variable off periods which appear to be related to stress  Dyskinesia appear decreased  She has left foot dystonia  No clear pattern to off periods so will continue on Sinemet to 1 tab qid and Artane  We may consider decreasing Artane in the future if dry mouth continues and if she continues to have cognitive changes  Her family has noted changes on cognition but this is not interfering with daily activities  [facility] 25/30  Encourage to continue with word games and keep a healthy diet and exercise  She would benefit from Big therapy  Referral made

## 2021-04-01 NOTE — PROGRESS NOTES
Patient ID: Malathi Palomares is a 62 y o  female  Assessment/Plan:    Parkinson disease ([address] )  Parkinson's disease with variable off periods which appear to be related to stress  Dyskinesia appear decreased  She has left foot dystonia  No clear pattern to off periods so will continue on Sinemet to 1 tab qid and Artane  We may consider decreasing Artane in the future if dry mouth continues and if she continues to have cognitive changes  Her family has noted changes on cognition but this is not interfering with daily activities  [facility] 25/30  She would benefit from Big therpay  Referral made  Diagnoses and all orders for this visit:    Parkinson disease [facility])  -     Ambulatory referral to Physical Therapy; Future  -     trihexyphenidyl (ARTANE) 2 mg tablet; Take 1 tablet (2 mg total) by mouth 3 (three) times a day with meals  -     carbidopa-levodopa (SINEMET)  mg per tablet; Take 1 tablet by mouth 4 (four) times a day    Other orders  -     amoxicillin (AMOXIL) 500 mg capsule; TAKE 1 CAPSULE BY MOUTH EVERY 8 HOURS UNTIL GONE           Subjective:    Malathi Palomares is a  female, disabled, part-time  with breast cancer (BRRCA/ HRT r+) in 1990 & 2010 s/p surgery and chemotherapy , depression, Parkinson's disease and RLS,  who presents today for follow up  To review, symptoms began in 2014 with left sided bradykinesia and left pinky tremor  She was seen by a neurologist at  [facility] in [place] [address]  LINO scan was positive  Started on Sinemet up to 1 5 tid with response with later addition of Artane 2mg tid       First seen by me Dec 2019 with mild facial and hand dyskinesia and Sinemet may be lasting about 5 hours  Also with episodes of tongue dryness and a sensation of swelling in the evenings, after dinner which can affect speech  Sinemet adjusted to 1 tabs 4 times daily (q 4-4 5 hours apart) which reduced dyskinesia  She lives with her daughter, son In law and granddaughter  She is  and  is alcoholic      Current relevant medications:  Sinemet 25/100 1 tab 4 times daily (9am, 1pm, 5pm and 9pm)   Artane 2mg tid (9am, 5pm, 9pm)    She returns today s/p emergency dental surgery yesterday so not quite herself  She reports being a little more unsteady at times  She has intermittent lightheadedness on standing so she is more cautious  She has intermittent tremors related to stressful events or incidents  She plans to download the edie Calm to see if this helps  She feels that medication start to breakthrough when stressed  She is tired between 12pm and 2am  She will rest if able to or work through it  She has not noted much dyskinesia       No constipation  Mild leakage likely with activity  She sleeps well with clonazepam  She has RLS symptoms at times  Michelle Horsfall is unchanged  If delayed for a dose she slurs  There is no drooling or difficulty swallowing  She continues to have tongue dryness at night so she drinks water throughout the night  There is no difficulty with dressing or hygiene acts       Mood has been well controlled on paroxetine 20g qhs and duloxetine for years  More recently has tried reducing duloxetine  She denies any hallucinations  She manages her own finances  Her daughter she lives with tells her she has memory issues but Ms Zoey Morrow states she does not always pay attention to routine activities  She manages her finances  She shares cooking and shopping and has no issues with this  She does household duties with no issues                 Objective:    Blood pressure 128/72, pulse 94, weight 73 1 kg (161 lb 3 2 oz), not currently breastfeeding  Physical Exam  Eyes:      Extraocular Movements: Extraocular movements intact  Pupils: Pupils are equal, round, and reactive to light  Neurological:      Mental Status: She is alert  Deep Tendon Reflexes: Strength normal          Neurological Exam  Mental Status  Alert   Oriented to person, place, time and situation  Memory: Registered 5/5  Recalled 2/5  Speech: hypophonia  Language is fluent with no aphasia  Able to name objects  Language: Decreased fluency 9 words in 1 min  Attention and concentration are normal   MOCA 25  Cranial Nerves  CN III, IV, VI: Extraocular movements intact bilaterally  Pupils equal round and reactive to light bilaterally  CN VII: Full and symmetric facial movement  CN VIII: Hearing is normal   CN XI: Shoulder shrug strength is normal   CN XII: Tongue midline without atrophy or fasciculations  Motor   Normal muscle tone  Strength is 5/5 throughout all four extremities  Sensory  Light touch is normal in upper and lower extremities  Coordination  Right: Finger-to-nose normal  Rapid alternating movement abnormality:  Left: Finger-to-nose normal  Rapid alternating movement abnormality:  See MDS UPDRS III  Gait  Casual gait: Abnormal pull test  Able to rise from chair without using arms  Left foot inward posturing  Slight decrease in stride       MDS UPDRS III                              12/18/19 4/23/20 4/1/21   Time since last dose: 2 hours  2 5 hours Due took in office   Speech  0  0 0   Facial Expression  1  1 1   Rigidity - Neck  0   0   Rigidity - Upper Extremity (Right)  0   0   Rigidity - Upper Extremity (Left)   0   0   Rigidity - Lower Extremity (Right)  0   0   Rigidity - Lower Extremity (Left)   0   0   Finger Taps (Right)   1  1 0   Finger Taps (Left)   1 1 1   Hand Movement (Right)  0  0 0   Hand Movement (Left)   1  0 1   Pronation/Supination (Right)  0  0 0   Pronation/Supination (Left)   1  0 1   Toe Tapping (Right) 0  0 0   Toe Tapping (Left) 0  1 2   Leg Agility (Right)  0   0   Leg Agility (Left)   0   0   Arising from Chair   0  0 0   Gait   0  0 1   Freezing of Gait 0  0 0   Postural Stability   0  0 1   Posture 0  0 0   Global spontaneity of movement 0  0 0   Postural Tremor (Right) 0  0 0   Postural Tremor (Left) 0  0 0 Kinetic Tremor (Right)  0  0 0   Kinetic Tremor (Left)  0  0 0   Rest tremor amplitude RUE 0  0 0   Rest tremor amplitude LUE 0  0 2   Rest tremor amplitude RLE 0  0 0   Reset tremor amplitude LLE 0  0 2   Lip/Jaw Tremor  0  0 0   Consistency of tremor 0  0 3   Motor Exam Total:         No dyskniesia  Left foot dystonia, equinovarus  ROS:    Review of Systems  Constitutional: Positive for fatigue (Between 12-2 she gets very tired)  Negative for appetite change and fever  HENT: Negative  Negative for hearing loss, tinnitus, trouble swallowing and voice change  Eyes: Negative  Negative for photophobia and pain  Respiratory: Negative  Negative for shortness of breath  Cardiovascular: Negative  Negative for palpitations  Gastrointestinal: Negative  Negative for nausea and vomiting  Endocrine: Negative  Negative for cold intolerance  Genitourinary: Negative  Negative for dysuria, frequency and urgency  Musculoskeletal: Positive for myalgias (Toes tend to lock up and freeze on her )  Negative for neck pain  More Unstable than usual     Skin: Negative  Negative for rash  Allergic/Immunologic: Negative  Neurological: Positive for dizziness (Sometimes) and weakness (legs)  Negative for tremors, seizures, syncope, facial asymmetry, speech difficulty, light-headedness, numbness and headaches  Breakthrough shaking when she gets stressed   Hematological: Negative  Does not bruise/bleed easily  Psychiatric/Behavioral: Negative for confusion, hallucinations and sleep disturbance  Some forgetfulness     All other systems reviewed and are negative    Review of system was personally reviewed

## 2021-04-12 ENCOUNTER — TELEPHONE (OUTPATIENT)
Dept: NEUROLOGY | Facility: CLINIC | Age: 61
End: 2021-04-12

## 2021-04-12 DIAGNOSIS — F32.0 MAJOR DEPRESSIVE DISORDER, SINGLE EPISODE, MILD (HCC): ICD-10-CM

## 2021-04-12 RX ORDER — DULOXETIN HYDROCHLORIDE 60 MG/1
60 CAPSULE, DELAYED RELEASE ORAL DAILY
Qty: 90 CAPSULE | Refills: 1 | Status: SHIPPED | OUTPATIENT
Start: 2021-04-12 | End: 2021-07-19 | Stop reason: SDUPTHER

## 2021-04-12 NOTE — TELEPHONE ENCOUNTER
Patient calling in to ask where her sinemet dose was sent  I reviewed this with her as it went to [facility] in [place]  She states she is in [place] and will be calling to have this filled at a closer pharmacy  She does not have any additional questions or concerns at this time

## 2021-06-14 ENCOUNTER — APPOINTMENT (OUTPATIENT)
Dept: RADIOLOGY | Facility: CLINIC | Age: 61
End: 2021-06-14
Payer: MEDICARE

## 2021-06-14 ENCOUNTER — OFFICE VISIT (OUTPATIENT)
Dept: FAMILY MEDICINE CLINIC | Facility: CLINIC | Age: 61
End: 2021-06-14
Payer: MEDICARE

## 2021-06-14 VITALS
HEART RATE: 93 BPM | WEIGHT: 160 LBS | TEMPERATURE: 97.5 F | BODY MASS INDEX: 25.71 KG/M2 | OXYGEN SATURATION: 98 % | SYSTOLIC BLOOD PRESSURE: 130 MMHG | HEIGHT: 66 IN | DIASTOLIC BLOOD PRESSURE: 80 MMHG | RESPIRATION RATE: 16 BRPM

## 2021-06-14 DIAGNOSIS — R07.81 RIB PAIN ON LEFT SIDE: ICD-10-CM

## 2021-06-14 DIAGNOSIS — E04.1 RIGHT THYROID NODULE: ICD-10-CM

## 2021-06-14 DIAGNOSIS — F41.9 ANXIETY: ICD-10-CM

## 2021-06-14 DIAGNOSIS — G25.81 RESTLESS LEGS SYNDROME: ICD-10-CM

## 2021-06-14 DIAGNOSIS — G20 PARKINSON DISEASE (HCC): Primary | ICD-10-CM

## 2021-06-14 DIAGNOSIS — D72.9 ABNORMAL WHITE BLOOD CELL (WBC): ICD-10-CM

## 2021-06-14 DIAGNOSIS — E78.2 MIXED HYPERLIPIDEMIA: ICD-10-CM

## 2021-06-14 DIAGNOSIS — Z13.6 SCREENING FOR CARDIOVASCULAR CONDITION: ICD-10-CM

## 2021-06-14 DIAGNOSIS — C50.911 BILATERAL MALIGNANT NEOPLASM OF BREAST IN FEMALE, UNSPECIFIED ESTROGEN RECEPTOR STATUS, UNSPECIFIED SITE OF BREAST (HCC): ICD-10-CM

## 2021-06-14 DIAGNOSIS — Z13.1 SCREENING FOR DIABETES MELLITUS: ICD-10-CM
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
## 2021-06-14 DIAGNOSIS — Z11.4 SCREENING FOR HIV (HUMAN IMMUNODEFICIENCY VIRUS): ICD-10-CM

## 2021-06-14 DIAGNOSIS — F32.0 MAJOR DEPRESSIVE DISORDER, SINGLE EPISODE, MILD (HCC): ICD-10-CM

## 2021-06-14 DIAGNOSIS — C50.912 BILATERAL MALIGNANT NEOPLASM OF BREAST IN FEMALE, UNSPECIFIED ESTROGEN RECEPTOR STATUS, UNSPECIFIED SITE OF BREAST (HCC): ICD-10-CM

## 2021-06-14 PROBLEM — Z23 ENCOUNTER FOR IMMUNIZATION: Status: RESOLVED | Noted: 2019-12-03 | Resolved: 2021-06-14

## 2021-06-14 PROBLEM — Z12.11 ENCOUNTER FOR SCREENING COLONOSCOPY: Status: RESOLVED | Noted: 2019-09-12 | Resolved: 2021-06-14

## 2021-06-14 PROBLEM — Z11.1 SCREENING-PULMONARY TB: Status: RESOLVED | Noted: 2019-11-04 | Resolved: 2021-06-14

## 2021-06-14 PROBLEM — Z00.00 MEDICARE ANNUAL WELLNESS VISIT, INITIAL: Status: RESOLVED | Noted: 2019-12-03 | Resolved: 2021-06-14

## 2021-06-14 PROCEDURE — 99214 OFFICE O/P EST MOD 30 MIN: CPT | Performed by: FAMILY MEDICINE

## 2021-06-14 PROCEDURE — 71101 X-RAY EXAM UNILAT RIBS/CHEST: CPT

## 2021-06-14 NOTE — PROGRESS NOTES
Lanie Bloch 1960 female MRN: [id number]    Family Medicine Follow-up Visit    ASSESSMENT/PLAN  Problem List Items Addressed This Visit        Endocrine    Right thyroid nodule     Last u/s was stable  Will continue to monitor          Relevant Orders    Lipid panel    Comprehensive metabolic panel    CBC and differential    TSH, 3rd generation with Free T4 reflex    UA (URINE) with reflex to Scope    Human Immunodeficiency Virus 1/2 Antigen / Antibody ( Fourth Generation) with Reflex Testing       Nervous and Auditory    Parkinson disease ([facility] ) - Primary     Following with neurology          Relevant Orders    Lipid panel    Comprehensive metabolic panel    CBC and differential    TSH, 3rd generation with Free T4 reflex    UA (URINE) with reflex to Scope    Human Immunodeficiency Virus 1/2 Antigen / Antibody ( Fourth Generation) with Reflex Testing       Other    Breast cancer ([facility] [address] )     S/p chemo, radiation and surgery  Has not yet est with oncology in our area, will work on this she was referred at prior visits            Relevant Orders    Lipid panel    Comprehensive metabolic panel    CBC and differential    TSH, 3rd generation with Free T4 reflex    UA (URINE) with reflex to Scope    Human Immunodeficiency Virus 1/2 Antigen / Antibody ( Fourth Generation) with Reflex Testing    Major depressive disorder, single episode, mild (HCC)     Stable on paxil          Relevant Orders    Lipid panel    Comprehensive metabolic panel    CBC and differential    TSH, 3rd generation with Free T4 reflex    UA (URINE) with reflex to Scope    Human Immunodeficiency Virus 1/2 Antigen / Antibody ( Fourth Generation) with Reflex Testing    Abnormal white blood cell (WBC)    Relevant Orders    Lipid panel    Comprehensive metabolic panel    CBC and differential    TSH, 3rd generation with Free T4 reflex    UA (URINE) with reflex to Scope    Human Immunodeficiency Virus 1/2 Antigen / Antibody ( Fourth Generation) with Reflex Testing    Mixed hyperlipidemia    Relevant Orders    Lipid panel    Comprehensive metabolic panel    CBC and differential    TSH, 3rd generation with Free T4 reflex    UA (URINE) with reflex to Scope    Human Immunodeficiency Virus 1/2 Antigen / Antibody ( Fourth Generation) with Reflex Testing    Restless legs syndrome    Relevant Orders    Lipid panel    Comprehensive metabolic panel    CBC and differential    TSH, 3rd generation with Free T4 reflex    UA (URINE) with reflex to Scope    Human Immunodeficiency Virus 1/2 Antigen / Antibody ( Fourth Generation) with Reflex Testing    Anxiety    Relevant Orders    Lipid panel    Comprehensive metabolic panel    CBC and differential    TSH, 3rd generation with Free T4 reflex    UA (URINE) with reflex to Scope    Human Immunodeficiency Virus 1/2 Antigen / Antibody ( Fourth Generation) with Reflex Testing    BMI 25 0-25 9,adult     BMI Counseling: Body mass index is 25 82 kg/m²  The BMI is above normal  Nutrition recommendations include reducing portion sizes, decreasing overall calorie intake and 3-5 servings of fruits/vegetables daily  Exercise recommendations include vigorous aerobic physical activity for 75 minutes/week           Relevant Orders    Lipid panel    Comprehensive metabolic panel    CBC and differential    TSH, 3rd generation with Free T4 reflex    UA (URINE) with reflex to Scope    Human Immunodeficiency Virus 1/2 Antigen / Antibody ( Fourth Generation) with Reflex Testing    Rib pain on left side    Relevant Orders    XR ribs left w pa chest min 3 views      Other Visit Diagnoses     Screening for diabetes mellitus        Relevant Orders    Lipid panel    Comprehensive metabolic panel    Screening for cardiovascular condition        Relevant Orders    Lipid panel    Comprehensive metabolic panel    Screening for HIV (human immunodeficiency virus)        Relevant Orders    Human Immunodeficiency Virus 1/2 Antigen / Antibody ( Fourth Generation) with Reflex Testing    Tick bite, sequela        Relevant Orders    Lyme Antibody Profile with reflex to [facility]                   Future Appointments   Date Time Provider Lillian Santiagoi   9/16/2021  1:30 PM Olya Cheng MD NEURO [facility]          SUBJECTIVE  CC: Follow-up (6 month checkup)      HPI:  Altagracia Sanders is a 62 y o  female who presents for check up  She official  her  in April  She is been going back and forth from [place] taking care of things  She had dental work done and is working on those issues  She got her covid shots since our last visit  She had neurology follow up and is working on her medications with her  She had a fall a few weeks ago and hit the side of her chest, still having some pain with breathing  Also had a tick bite on her back in the beginning of May, she is worried about lyme disease     HPI    Review of Systems   Constitutional: Negative for chills, fatigue and fever  HENT: Negative for congestion, postnasal drip, rhinorrhea and sinus pressure  Eyes: Negative for photophobia and visual disturbance  Respiratory: Negative for cough and shortness of breath  Cardiovascular: Negative for chest pain, palpitations and leg swelling  Gastrointestinal: Negative for abdominal pain, constipation, diarrhea, nausea and vomiting  Genitourinary: Negative for difficulty urinating and dysuria  Musculoskeletal: Negative for arthralgias and myalgias  Skin: Negative for color change and rash  Neurological: Negative for dizziness, weakness, light-headedness and headaches         Historical Information   The patient history was reviewed as follows:    Past Medical History:   Diagnosis Date    Anxiety     Breast cancer ([facility] )     bilateral    Depression     Parkinson disease ([facility] )     Squamous cell skin cancer 11/23/2020    SCCIS right clavicle     Past Surgical History:   Procedure Laterality Date    BREAST RECONSTRUCTION Bilateral     GANGLION CYST EXCISION Left     leg    MASTECTOMY Bilateral     MOHS SURGERY  01/06/2021    SCCIS right clavicle, Dr Omer Hermosillo History   Problem Relation Age of Onset    Stroke Mother     Basal cell carcinoma Mother     Cancer Father     Stroke Brother       Social History   Social History     Substance and Sexual Activity   Alcohol Use Yes     Social History     Substance and Sexual Activity   Drug Use Never     Social History     Tobacco Use   Smoking Status Never Smoker   Smokeless Tobacco Never Used       Medications:     Current Outpatient Medications:     Ascorbic Acid (VITAMIN C) 100 MG tablet, Take 1,000 mg by mouth daily , Disp: , Rfl:     b complex vitamins capsule, Take 1 capsule by mouth daily, Disp: , Rfl:     carbidopa-levodopa (SINEMET)  mg per tablet, Take 1 tablet by mouth 4 (four) times a day, Disp: 360 tablet, Rfl: 2    clonazePAM (KlonoPIN) 0 5 mg tablet, Take 1 tablet (0 5 mg total) by mouth every evening, Disp: 30 tablet, Rfl: 3    DULoxetine (CYMBALTA) 60 mg delayed release capsule, Take 1 capsule (60 mg total) by mouth daily, Disp: 90 capsule, Rfl: 1    Multiple Vitamins-Minerals (MULTIVITAMIN WITH MINERALS) tablet, Take 1 tablet by mouth daily, Disp: , Rfl:     PARoxetine (PAXIL) 20 mg tablet, Take 1 tablet (20 mg total) by mouth daily, Disp: 90 tablet, Rfl: 1    trihexyphenidyl (ARTANE) 2 mg tablet, Take 1 tablet (2 mg total) by mouth 3 (three) times a day with meals, Disp: 270 tablet, Rfl: 2    amoxicillin (AMOXIL) 500 mg capsule, TAKE 1 CAPSULE BY MOUTH EVERY 8 HOURS UNTIL GONE, Disp: , Rfl:     Zoster Vac Recomb Adjuvanted (SHINGRIX) 50 MCG/0 5ML SUSR, 0 5mL IM for one dose, followed by 0 5mL IM 2-6 months after first dose (Patient not taking: Reported on 3/4/2020), Disp: 1 each, Rfl: 1  No Known Allergies    OBJECTIVE    Vitals:   Vitals:    06/14/21 1255   BP: 130/80   BP Location: Right arm   Patient Position: Sitting   Cuff Size: Standard   Pulse: 93   Resp: 16   Temp: 97 5 °F (36 4 °C)   TempSrc: Tympanic   SpO2: 98%   Weight: 72 6 kg (160 lb)   Height: 5' 6" (1 676 m)           Physical Exam  Constitutional:       Appearance: She is well-developed  HENT:      Head: Normocephalic and atraumatic  Eyes:      Pupils: Pupils are equal, round, and reactive to light  Cardiovascular:      Rate and Rhythm: Normal rate and regular rhythm  Heart sounds: Normal heart sounds  Pulmonary:      Effort: Pulmonary effort is normal  No respiratory distress  Breath sounds: Normal breath sounds  No wheezing  Abdominal:      General: Bowel sounds are normal  There is no distension  Palpations: Abdomen is soft  Tenderness: There is no abdominal tenderness  Musculoskeletal:         General: No tenderness  Normal range of motion  Cervical back: Normal range of motion and neck supple  Skin:     General: Skin is warm and dry  Neurological:      Mental Status: She is alert and oriented to person, place, and time     Psychiatric:         Behavior: Behavior normal             Labs:        DO Kalyn    6/14/2021

## 2021-06-14 NOTE — ASSESSMENT & PLAN NOTE
BMI Counseling: Body mass index is 25 82 kg/m²  The BMI is above normal  Nutrition recommendations include reducing portion sizes, decreasing overall calorie intake and 3-5 servings of fruits/vegetables daily  Exercise recommendations include vigorous aerobic physical activity for 75 minutes/week

## 2021-06-14 NOTE — ASSESSMENT & PLAN NOTE
S/p chemo, radiation and surgery  Has not yet est with oncology in our area, will work on this she was referred at prior visits

## 2021-06-17 LAB
ALBUMIN SERPL-MCNC: 4.5 G/DL (ref 3.6–5.1)
ALBUMIN/GLOB SERPL: 2 (CALC) (ref 1–2.5)
ALP SERPL-CCNC: 66 U/L (ref 37–153)
ALT SERPL-CCNC: 13 U/L (ref 6–29)
APPEARANCE UR: CLEAR
AST SERPL-CCNC: 15 U/L (ref 10–35)
B BURGDOR AB SER IA-ACNC: <0.9 INDEX
BASOPHILS # BLD AUTO: 49 CELLS/UL (ref 0–200)
BASOPHILS NFR BLD AUTO: 1.3 %
BILIRUB SERPL-MCNC: 0.6 MG/DL (ref 0.2–1.2)
BILIRUB UR QL STRIP: NEGATIVE
BUN SERPL-MCNC: 18 MG/DL (ref 7–25)
BUN/CREAT SERPL: NORMAL (CALC) (ref 6–22)
CALCIUM SERPL-MCNC: 9.7 MG/DL (ref 8.6–10.4)
CHLORIDE SERPL-SCNC: 103 MMOL/L (ref 98–110)
CHOLEST SERPL-MCNC: 266 MG/DL
CHOLEST/HDLC SERPL: 2.6 (CALC)
CO2 SERPL-SCNC: 31 MMOL/L (ref 20–32)
COLOR UR: YELLOW
CREAT SERPL-MCNC: 0.72 MG/DL (ref 0.5–0.99)
EOSINOPHIL # BLD AUTO: 277 CELLS/UL (ref 15–500)
EOSINOPHIL NFR BLD AUTO: 7.3 %
ERYTHROCYTE [DISTWIDTH] IN BLOOD BY AUTOMATED COUNT: 12.9 % (ref 11–15)
GLOBULIN SER CALC-MCNC: 2.3 G/DL (CALC) (ref 1.9–3.7)
GLUCOSE SERPL-MCNC: 88 MG/DL (ref 65–99)
GLUCOSE UR QL STRIP: NEGATIVE
HCT VFR BLD AUTO: 35 % (ref 35–45)
HDLC SERPL-MCNC: 103 MG/DL
HGB BLD-MCNC: 11.8 G/DL (ref 11.7–15.5)
HGB UR QL STRIP: NEGATIVE
HIV 1+2 AB+HIV1 P24 AG SERPL QL IA: NORMAL
KETONES UR QL STRIP: NEGATIVE
LDLC SERPL CALC-MCNC: 149 MG/DL (CALC)
LEUKOCYTE ESTERASE UR QL STRIP: NEGATIVE
LYMPHOCYTES # BLD AUTO: 1064 CELLS/UL (ref 850–3900)
LYMPHOCYTES NFR BLD AUTO: 28 %
MCH RBC QN AUTO: 30.6 PG (ref 27–33)
MCHC RBC AUTO-ENTMCNC: 33.7 G/DL (ref 32–36)
MCV RBC AUTO: 90.9 FL (ref 80–100)
MONOCYTES # BLD AUTO: 319 CELLS/UL (ref 200–950)
MONOCYTES NFR BLD AUTO: 8.4 %
NEUTROPHILS # BLD AUTO: 2090 CELLS/UL (ref 1500–7800)
NEUTROPHILS NFR BLD AUTO: 55 %
NITRITE UR QL STRIP: NEGATIVE
NONHDLC SERPL-MCNC: 163 MG/DL (CALC)
PH UR STRIP: 7 [PH] (ref 5–8)
PLATELET # BLD AUTO: 346 THOUSAND/UL (ref 140–400)
PMV BLD REES-ECKER: 9.4 FL (ref 7.5–12.5)
POTASSIUM SERPL-SCNC: 4.6 MMOL/L (ref 3.5–5.3)
PROT SERPL-MCNC: 6.8 G/DL (ref 6.1–8.1)
PROT UR QL STRIP: NEGATIVE
RBC # BLD AUTO: 3.85 MILLION/UL (ref 3.8–5.1)
SL AMB EGFR AFRICAN AMERICAN: 105 ML/MIN/1.73M2
SL AMB EGFR NON AFRICAN AMERICAN: 91 ML/MIN/1.73M2
SODIUM SERPL-SCNC: 139 MMOL/L (ref 135–146)
SP GR UR STRIP: 1.02 (ref 1–1.03)
TRIGL SERPL-MCNC: 46 MG/DL
TSH SERPL-ACNC: 1.54 MIU/L (ref 0.4–4.5)
WBC # BLD AUTO: 3.8 THOUSAND/UL (ref 3.8–10.8)

## 2021-06-21 ENCOUNTER — TELEPHONE (OUTPATIENT)
Dept: FAMILY MEDICINE CLINIC | Facility: CLINIC | Age: 61
End: 2021-06-21

## 2021-06-21 NOTE — TELEPHONE ENCOUNTER
Dr Juan Farfan,  I spoke to Breezy Cohen and she understands and will try OTC meds and if she feels worse she will go to Er or Urgent care    Thank you   Dwight Chong

## 2021-06-21 NOTE — TELEPHONE ENCOUNTER
Deep breathing  hurts both sides now and was In to see you and x-rays were fine and wonders what is the nest step  She did miss 1 of her parkinsons pills due to not getting them in the mail  They are coming today  She didn't think that would be the issue but wanted you aware   Her # is   [phone]  Please advise  Thank you

## 2021-06-25 ENCOUNTER — TELEPHONE (OUTPATIENT)
Dept: FAMILY MEDICINE CLINIC | Facility: CLINIC | Age: 61
End: 2021-06-25

## 2021-07-15 DIAGNOSIS — F41.9 ANXIETY: ICD-10-CM

## 2021-07-15 RX ORDER — PAROXETINE HYDROCHLORIDE 20 MG/1
20 TABLET, FILM COATED ORAL DAILY
Qty: 90 TABLET | Refills: 1 | Status: SHIPPED | OUTPATIENT
Start: 2021-07-15 | End: 2022-01-18 | Stop reason: SDUPTHER

## 2021-07-16 ENCOUNTER — TELEPHONE (OUTPATIENT)
Dept: FAMILY MEDICINE CLINIC | Facility: CLINIC | Age: 61
End: 2021-07-16

## 2021-07-16 NOTE — TELEPHONE ENCOUNTER
Patient called  She is still having L breast pain  She is concerned that her breast implant may be leaking  She does not have a surgeon anymore since she hasn't had the surgery since 1991  She wanted to know if there was a surgeon you recommended  I told her I would check with you and I can call her back  [phone]  Thank you

## 2021-07-19 DIAGNOSIS — F32.0 MAJOR DEPRESSIVE DISORDER, SINGLE EPISODE, MILD (HCC): ICD-10-CM

## 2021-07-19 RX ORDER — DULOXETIN HYDROCHLORIDE 60 MG/1
60 CAPSULE, DELAYED RELEASE ORAL DAILY
Qty: 90 CAPSULE | Refills: 1 | Status: SHIPPED | OUTPATIENT
Start: 2021-07-19 | End: 2022-01-18 | Stop reason: SDUPTHER

## 2021-07-26 DIAGNOSIS — F41.9 ANXIETY: ICD-10-CM

## 2021-07-26 DIAGNOSIS — G25.81 RESTLESS LEGS SYNDROME: ICD-10-CM

## 2021-07-27 RX ORDER — CLONAZEPAM 0.5 MG/1
0.5 TABLET ORAL EVERY EVENING
Qty: 30 TABLET | Refills: 3 | Status: SHIPPED | OUTPATIENT
Start: 2021-07-27 | End: 2021-12-16 | Stop reason: SDUPTHER

## 2021-09-14 ENCOUNTER — TELEPHONE (OUTPATIENT)
Dept: FAMILY MEDICINE CLINIC | Facility: CLINIC | Age: 61
End: 2021-09-14

## 2021-09-14 DIAGNOSIS — G20 PARKINSON DISEASE (HCC): Primary | ICD-10-CM

## 2021-09-14 NOTE — TELEPHONE ENCOUNTER
Leeann Duenas from [facility] called  Leeann Duenas requested a referral be put into [facility] for patient using dx G20      Thank you

## 2021-09-16 ENCOUNTER — OFFICE VISIT (OUTPATIENT)
Dept: NEUROLOGY | Facility: CLINIC | Age: 61
End: 2021-09-16
Payer: MEDICARE

## 2021-09-16 VITALS
SYSTOLIC BLOOD PRESSURE: 120 MMHG | HEART RATE: 88 BPM | BODY MASS INDEX: 25.76 KG/M2 | DIASTOLIC BLOOD PRESSURE: 74 MMHG | WEIGHT: 159.6 LBS

## 2021-09-16 DIAGNOSIS — G20 PARKINSON DISEASE (HCC): Primary | ICD-10-CM

## 2021-09-16 DIAGNOSIS — G25.81 RESTLESS LEGS SYNDROME: ICD-10-CM

## 2021-09-16 PROCEDURE — 99214 OFFICE O/P EST MOD 30 MIN: CPT | Performed by: PSYCHIATRY & NEUROLOGY
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
## 2021-09-16 NOTE — ASSESSMENT & PLAN NOTE
Parkinson's disease with increased fatigue with some off symptoms between 1-3pm  Otherwise doing well with tremor breaking through when stressed  She has left foot dystonia  No dyskinesia  No clear pattern to off periods so will continue on Sinemet to 1 tab richard and Suellen  Agree with increasing exercise  Encourage to start [employer]  She will be starting a new am job so may have more fatigue in the afternoon  We discussed allowing for a short rest or nap to reset for the day  Mild cognitive changes are not suggestive of dementia and are interfering with daily function at home or outside

## 2021-09-17 ENCOUNTER — TELEPHONE (OUTPATIENT)
Dept: FAMILY MEDICINE CLINIC | Facility: CLINIC | Age: 61
End: 2021-09-17

## 2021-09-17 LAB
ALBUMIN SERPL-MCNC: 4.4 G/DL (ref 3.6–5.1)
ALBUMIN/GLOB SERPL: 2.1 (CALC) (ref 1–2.5)
ALP SERPL-CCNC: 66 U/L (ref 37–153)
ALT SERPL-CCNC: 15 U/L (ref 6–29)
APPEARANCE UR: CLEAR
AST SERPL-CCNC: 16 U/L (ref 10–35)
BACTERIA UR QL AUTO: ABNORMAL /HPF
BASOPHILS # BLD AUTO: 81 CELLS/UL (ref 0–200)
BASOPHILS NFR BLD AUTO: 1.8 %
BILIRUB SERPL-MCNC: 0.4 MG/DL (ref 0.2–1.2)
BILIRUB UR QL STRIP: NEGATIVE
BUN SERPL-MCNC: 19 MG/DL (ref 7–25)
BUN/CREAT SERPL: ABNORMAL (CALC) (ref 6–22)
CALCIUM SERPL-MCNC: 9.7 MG/DL (ref 8.6–10.4)
CHLORIDE SERPL-SCNC: 105 MMOL/L (ref 98–110)
CHOLEST SERPL-MCNC: 261 MG/DL
CHOLEST/HDLC SERPL: 2.8 (CALC)
CO2 SERPL-SCNC: 30 MMOL/L (ref 20–32)
COLOR UR: YELLOW
CREAT SERPL-MCNC: 0.79 MG/DL (ref 0.5–0.99)
EOSINOPHIL # BLD AUTO: 365 CELLS/UL (ref 15–500)
EOSINOPHIL NFR BLD AUTO: 8.1 %
ERYTHROCYTE [DISTWIDTH] IN BLOOD BY AUTOMATED COUNT: 12.8 % (ref 11–15)
GLOBULIN SER CALC-MCNC: 2.1 G/DL (CALC) (ref 1.9–3.7)
GLUCOSE SERPL-MCNC: 95 MG/DL (ref 65–99)
GLUCOSE UR QL STRIP: NEGATIVE
HCT VFR BLD AUTO: 35.6 % (ref 35–45)
HDLC SERPL-MCNC: 92 MG/DL
HGB BLD-MCNC: 11.5 G/DL (ref 11.7–15.5)
HGB UR QL STRIP: NEGATIVE
HIV 1+2 AB+HIV1 P24 AG SERPL QL IA: NORMAL
HYALINE CASTS #/AREA URNS LPF: ABNORMAL /LPF
KETONES UR QL STRIP: NEGATIVE
LDLC SERPL CALC-MCNC: 151 MG/DL (CALC)
LEUKOCYTE ESTERASE UR QL STRIP: ABNORMAL
LYMPHOCYTES # BLD AUTO: 1287 CELLS/UL (ref 850–3900)
LYMPHOCYTES NFR BLD AUTO: 28.6 %
MCH RBC QN AUTO: 29.4 PG (ref 27–33)
MCHC RBC AUTO-ENTMCNC: 32.3 G/DL (ref 32–36)
MCV RBC AUTO: 91 FL (ref 80–100)
MONOCYTES # BLD AUTO: 414 CELLS/UL (ref 200–950)
MONOCYTES NFR BLD AUTO: 9.2 %
NEUTROPHILS # BLD AUTO: 2354 CELLS/UL (ref 1500–7800)
NEUTROPHILS NFR BLD AUTO: 52.3 %
NITRITE UR QL STRIP: NEGATIVE
NONHDLC SERPL-MCNC: 169 MG/DL (CALC)
PH UR STRIP: 7 [PH] (ref 5–8)
PLATELET # BLD AUTO: 358 THOUSAND/UL (ref 140–400)
PMV BLD REES-ECKER: 9.4 FL (ref 7.5–12.5)
POTASSIUM SERPL-SCNC: 5.4 MMOL/L (ref 3.5–5.3)
PROT SERPL-MCNC: 6.5 G/DL (ref 6.1–8.1)
PROT UR QL STRIP: NEGATIVE
RBC # BLD AUTO: 3.91 MILLION/UL (ref 3.8–5.1)
RBC #/AREA URNS HPF: ABNORMAL /HPF
SL AMB EGFR AFRICAN AMERICAN: 94 ML/MIN/1.73M2
SL AMB EGFR NON AFRICAN AMERICAN: 81 ML/MIN/1.73M2
SODIUM SERPL-SCNC: 139 MMOL/L (ref 135–146)
SP GR UR STRIP: 1.02 (ref 1–1.03)
SQUAMOUS #/AREA URNS HPF: ABNORMAL /HPF
TRIGL SERPL-MCNC: 74 MG/DL
TSH SERPL-ACNC: 1.77 MIU/L (ref 0.4–4.5)
WBC # BLD AUTO: 4.5 THOUSAND/UL (ref 3.8–10.8)
WBC #/AREA URNS HPF: ABNORMAL /HPF

## 2021-09-21 ENCOUNTER — TELEPHONE (OUTPATIENT)
Dept: FAMILY MEDICINE CLINIC | Facility: CLINIC | Age: 61
End: 2021-09-21

## 2021-09-21 ENCOUNTER — CLINICAL SUPPORT (OUTPATIENT)
Dept: FAMILY MEDICINE CLINIC | Facility: CLINIC | Age: 61
End: 2021-09-21
Payer: MEDICARE

## 2021-09-21 DIAGNOSIS — Z11.1 SCREENING FOR TUBERCULOSIS: Primary | ICD-10-CM

## 2021-09-21 DIAGNOSIS — N39.0 URINARY TRACT INFECTION WITHOUT HEMATURIA, SITE UNSPECIFIED: Primary | ICD-10-CM

## 2021-09-21 PROCEDURE — 86580 TB INTRADERMAL TEST: CPT

## 2021-09-21 RX ORDER — NITROFURANTOIN 25; 75 MG/1; MG/1
100 CAPSULE ORAL 2 TIMES DAILY
Qty: 10 CAPSULE | Refills: 0 | Status: SHIPPED | OUTPATIENT
Start: 2021-09-21 | End: 2021-09-26

## 2021-09-21 RX ORDER — NITROFURANTOIN 25; 75 MG/1; MG/1
100 CAPSULE ORAL 2 TIMES DAILY
Qty: 10 CAPSULE | Refills: 0 | Status: SHIPPED | OUTPATIENT
Start: 2021-09-21 | End: 2021-09-21 | Stop reason: SDUPTHER

## 2021-09-21 NOTE — TELEPHONE ENCOUNTER
Patient notified of the new prescription  She's very appreciative and has no questions or concerns to be addressed at this time

## 2021-09-21 NOTE — TELEPHONE ENCOUNTER
Patient now started with UTI symptoms since receiving her lab results from 9/16  She is having itching and cramping  She is asking for antibiotics to be sent into [facility] in [place] patient can be reached at [phone]

## 2021-09-23 ENCOUNTER — CLINICAL SUPPORT (OUTPATIENT)
Dept: FAMILY MEDICINE CLINIC | Facility: CLINIC | Age: 61
End: 2021-09-23

## 2021-09-23 DIAGNOSIS — Z11.1 ENCOUNTER FOR PPD SKIN TEST READING: Primary | ICD-10-CM

## 2021-09-23 LAB
INDURATION: 0 MM
TB SKIN TEST: NEGATIVE

## 2021-12-01 ENCOUNTER — CONSULT (OUTPATIENT)
Dept: PLASTIC SURGERY | Facility: CLINIC | Age: 61
End: 2021-12-01
Payer: MEDICARE

## 2021-12-01 VITALS
SYSTOLIC BLOOD PRESSURE: 122 MMHG | HEIGHT: 67 IN | HEART RATE: 109 BPM | TEMPERATURE: 97.9 F | DIASTOLIC BLOOD PRESSURE: 81 MMHG | WEIGHT: 156 LBS | BODY MASS INDEX: 24.48 KG/M2

## 2021-12-01 DIAGNOSIS — T85.44XA CAPSULAR CONTRACTURE OF BREAST IMPLANT, INITIAL ENCOUNTER: Primary | ICD-10-CM

## 2021-12-01 PROCEDURE — 99204 OFFICE O/P NEW MOD 45 MIN: CPT | Performed by: STUDENT IN AN ORGANIZED HEALTH CARE EDUCATION/TRAINING PROGRAM

## 2021-12-13 DIAGNOSIS — G25.81 RESTLESS LEGS SYNDROME: ICD-10-CM

## 2021-12-13 DIAGNOSIS — F41.9 ANXIETY: ICD-10-CM

## 2021-12-14 RX ORDER — CLONAZEPAM 0.5 MG/1
0.5 TABLET ORAL EVERY EVENING
Qty: 30 TABLET | Refills: 3 | OUTPATIENT
Start: 2021-12-14

## 2021-12-16 ENCOUNTER — OFFICE VISIT (OUTPATIENT)
Dept: FAMILY MEDICINE CLINIC | Facility: CLINIC | Age: 61
End: 2021-12-16
Payer: MEDICARE

## 2021-12-16 VITALS
TEMPERATURE: 98.9 F | WEIGHT: 156.4 LBS | BODY MASS INDEX: 25.13 KG/M2 | HEIGHT: 66 IN | RESPIRATION RATE: 20 BRPM | DIASTOLIC BLOOD PRESSURE: 72 MMHG | SYSTOLIC BLOOD PRESSURE: 120 MMHG | OXYGEN SATURATION: 97 % | HEART RATE: 93 BPM

## 2021-12-16 DIAGNOSIS — F32.0 MAJOR DEPRESSIVE DISORDER, SINGLE EPISODE, MILD (HCC): ICD-10-CM

## 2021-12-16 DIAGNOSIS — C50.912 BILATERAL MALIGNANT NEOPLASM OF BREAST IN FEMALE, UNSPECIFIED ESTROGEN RECEPTOR STATUS, UNSPECIFIED SITE OF BREAST (HCC): ICD-10-CM

## 2021-12-16 DIAGNOSIS — F41.9 ANXIETY: ICD-10-CM

## 2021-12-16 DIAGNOSIS — Z12.12 SCREENING FOR COLORECTAL CANCER: ICD-10-CM

## 2021-12-16 DIAGNOSIS — Z00.00 MEDICARE ANNUAL WELLNESS VISIT, SUBSEQUENT: Primary | ICD-10-CM

## 2021-12-16 DIAGNOSIS — G25.81 RESTLESS LEGS SYNDROME: ICD-10-CM

## 2021-12-16 DIAGNOSIS — C50.911 BILATERAL MALIGNANT NEOPLASM OF BREAST IN FEMALE, UNSPECIFIED ESTROGEN RECEPTOR STATUS, UNSPECIFIED SITE OF BREAST (HCC): ICD-10-CM

## 2021-12-16 DIAGNOSIS — Z12.11 SCREENING FOR COLORECTAL CANCER: ICD-10-CM

## 2021-12-16 DIAGNOSIS — E78.2 MIXED HYPERLIPIDEMIA: ICD-10-CM

## 2021-12-16 DIAGNOSIS — G20 PARKINSON DISEASE (HCC): ICD-10-CM

## 2021-12-16 PROCEDURE — G0438 PPPS, INITIAL VISIT: HCPCS | Performed by: FAMILY MEDICINE

## 2021-12-16 RX ORDER — CLONAZEPAM 0.5 MG/1
0.5 TABLET ORAL EVERY EVENING
Qty: 30 TABLET | Refills: 5 | Status: SHIPPED | OUTPATIENT
Start: 2021-12-16

## 2022-01-05 PROCEDURE — U0005 INFEC AGEN DETEC AMPLI PROBE: HCPCS | Performed by: FAMILY MEDICINE

## 2022-01-05 PROCEDURE — U0003 INFECTIOUS AGENT DETECTION BY NUCLEIC ACID (DNA OR RNA); SEVERE ACUTE RESPIRATORY SYNDROME CORONAVIRUS 2 (SARS-COV-2) (CORONAVIRUS DISEASE [COVID-19]), AMPLIFIED PROBE TECHNIQUE, MAKING USE OF HIGH THROUGHPUT TECHNOLOGIES AS DESCRIBED BY CMS-2020-01-R: HCPCS | Performed by: FAMILY MEDICINE

## 2022-01-07 NOTE — TELEPHONE ENCOUNTER
Pt LVM asking for her covid test results  Spoke to pt via telephone and let her know that we do not have the results back yet and that they are taking about 72 hours at this time  Pt has mychart and will monitor it for her results

## 2022-01-11 ENCOUNTER — TELEMEDICINE (OUTPATIENT)
Dept: FAMILY MEDICINE CLINIC | Facility: CLINIC | Age: 62
End: 2022-01-11
Payer: MEDICARE

## 2022-01-11 VITALS — HEIGHT: 66 IN | WEIGHT: 156 LBS | BODY MASS INDEX: 25.07 KG/M2

## 2022-01-11 DIAGNOSIS — U07.1 COVID-19: Primary | ICD-10-CM

## 2022-01-11 PROCEDURE — 99214 OFFICE O/P EST MOD 30 MIN: CPT | Performed by: FAMILY MEDICINE

## 2022-01-11 NOTE — PROGRESS NOTES
COVID-19 Outpatient Progress Note    Assessment/Plan:    Problem List Items Addressed This Visit     None      Visit Diagnoses     COVID-19    -  Primary         Disposition:     Patient has COVID-19 infection  Based off CDC guidelines, they were recommended to isolate for 5 days from the date of the positive test  If they remain asymptomatic, isolation may be ended followed by 5 days of wearing a mask when around othes to minimize risk of infecting others  If they have a fever, continue to stay home until fever resolves for at least 24 hours  I have spent 15 minutes directly with the patient  Greater than 50% of this time was spent in counseling/coordination of care regarding: diagnostic results, prognosis, risks and benefits of treatment options, instructions for management, patient and family education, importance of treatment compliance, risk factor reductions and impressions  Encounter provider Marcus Turner DO    Provider located at [address] [place] [id number] [phone]    Recent Visits  No visits were found meeting these conditions  Showing recent visits within past 7 days and meeting all other requirements  Today's Visits  Date Type Provider Dept   01/11/22 Telemedicine Marcus Turner DO [facility]   Showing today's visits and meeting all other requirements  Future Appointments  No visits were found meeting these conditions  Showing future appointments within next 150 days and meeting all other requirements     This virtual check-in was done via [employer] and patient was informed that this is a secure, HIPAA-compliant platform  She agrees to proceed  Patient agrees to participate in a virtual check in via telephone or video visit instead of presenting to the office to address urgent/immediate medical needs  Patient is aware this is a billable service      After connecting through [facility], the patient was identified by name and date of birth  Malathi Palomares was informed that this was a telemedicine visit and that the exam was being conducted confidentially over secure lines  My office door was closed  No one else was in the room  Malathi Palomares acknowledged consent and understanding of privacy and security of the telemedicine visit  I informed the patient that I have reviewed her record in Epic and presented the opportunity for her to ask any questions regarding the visit today  The patient agreed to participate  Verification of patient location:  Patient is located in the following state in which I hold an active license: [place]    Subjective:   Malathi Palomares is a 62 y o  female who has been screened for COVID-19  Symptom change since last report: improving  Patient's symptoms include fatigue and nasal congestion  Patient denies fever, chills, rhinorrhea, cough, shortness of breath, abdominal pain, nausea, vomiting, diarrhea, myalgias and headaches  - Date of positive COVID-19 PCR: 1/5/2022  Type of test: PCR    COVID-19 vaccination status: Fully vaccinated (primary series)    Jewell Jeff has been staying home and has isolated themselves in her home  She is taking care to not share personal items and is cleaning all surfaces that are touched often, like counters, tabletops, and doorknobs using household cleaning sprays or wipes  She is wearing a mask when she leaves her room       Lab Results   Component Value Date    SARSCOV2 Positive (A) 01/05/2022    SARSCOV2 Not Detected 12/03/2020     Past Medical History:   Diagnosis Date    Anxiety     [facility] cancer ([facility] )     bilateral    Depression     Parkinson disease ([facility] )     Squamous cell skin cancer 11/23/2020    SCCIS right clavicle     Past Surgical History:   Procedure Laterality Date    BREAST RECONSTRUCTION Bilateral     GANGLION CYST EXCISION Left     leg    MASTECTOMY Bilateral     MOHS SURGERY  01/06/2021    SCCIS right clavicle,   Pan     SKIN BIOPSY      TONSILLECTOMY       Current Outpatient Medications   Medication Sig Dispense Refill    Ascorbic Acid (VITAMIN C) 100 MG tablet Take 1,000 mg by mouth daily       b complex vitamins capsule Take 1 capsule by mouth daily      carbidopa-levodopa (SINEMET)  mg per tablet Take 1 tablet by mouth 4 (four) times a day 360 tablet 2    clonazePAM (KlonoPIN) 0 5 mg tablet Take 1 tablet (0 5 mg total) by mouth every evening 30 tablet 5    DULoxetine (CYMBALTA) 60 mg delayed release capsule Take 1 capsule (60 mg total) by mouth daily 90 capsule 1    Multiple Vitamins-Minerals (MULTIVITAMIN WITH MINERALS) tablet Take 1 tablet by mouth daily      PARoxetine (PAXIL) 20 mg tablet Take 1 tablet (20 mg total) by mouth daily 90 tablet 1    trihexyphenidyl (ARTANE) 2 mg tablet Take 1 tablet (2 mg total) by mouth 3 (three) times a day with meals 270 tablet 2    amoxicillin (AMOXIL) 500 mg capsule TAKE 1 CAPSULE BY MOUTH EVERY 8 HOURS UNTIL GONE (Patient not taking: Reported on 9/16/2021)      Zoster Vac Recomb Adjuvanted (SHINGRIX) 50 MCG/0 5ML SUSR 0 5mL IM for one dose, followed by 0 5mL IM 2-6 months after first dose (Patient not taking: Reported on 12/16/2021 ) 1 each 1     No current facility-administered medications for this visit  No Known Allergies    Review of Systems   Constitutional: Positive for fatigue  Negative for chills and fever  HENT: Positive for congestion  Negative for postnasal drip, rhinorrhea and sinus pressure  Eyes: Negative for photophobia and visual disturbance  Respiratory: Negative for cough and shortness of breath  Cardiovascular: Negative for chest pain, palpitations and leg swelling  Gastrointestinal: Negative for abdominal pain, constipation, diarrhea, nausea and vomiting  Genitourinary: Negative for difficulty urinating and dysuria  Musculoskeletal: Negative for arthralgias and myalgias  Skin: Negative for color change and rash  Neurological: Negative for dizziness, weakness, light-headedness and headaches  Objective:    Vitals:    01/11/22 0926   Weight: 70 8 kg (156 lb)   Height: 5' 5 7" (1 669 m)       Physical Exam  Constitutional:       General: She is not in acute distress  Appearance: Normal appearance  She is not ill-appearing, toxic-appearing or diaphoretic  HENT:      Head: Normocephalic and atraumatic  Eyes:      Extraocular Movements: Extraocular movements intact  Conjunctiva/sclera: Conjunctivae normal    Pulmonary:      Effort: Pulmonary effort is normal  No respiratory distress  Musculoskeletal:         General: Normal range of motion  Cervical back: Normal range of motion  Neurological:      General: No focal deficit present  Mental Status: She is alert and oriented to person, place, and time  Psychiatric:         Mood and Affect: Mood normal          Behavior: Behavior normal          VIRTUAL VISIT DISCLAIMER    Darvin Romero verbally agrees to participate in [employer]  Pt is aware that [employer] could be limited without vital signs or the ability to perform a full hands-on physical exam  Jadyn Guerra understands she or the provider may request at any time to terminate the video visit and request the patient to seek care or treatment in person

## 2022-01-14 ENCOUNTER — OFFICE VISIT (OUTPATIENT)
Dept: URGENT CARE | Facility: CLINIC | Age: 62
End: 2022-01-14
Payer: MEDICARE

## 2022-01-14 ENCOUNTER — APPOINTMENT (OUTPATIENT)
Dept: RADIOLOGY | Facility: CLINIC | Age: 62
End: 2022-01-14
Payer: MEDICARE

## 2022-01-14 VITALS
HEART RATE: 106 BPM | TEMPERATURE: 99.2 F | HEIGHT: 65 IN | RESPIRATION RATE: 16 BRPM | OXYGEN SATURATION: 97 % | WEIGHT: 156 LBS | BODY MASS INDEX: 25.99 KG/M2

## 2022-01-14 DIAGNOSIS — M25.561 ACUTE PAIN OF RIGHT KNEE: ICD-10-CM

## 2022-01-14 DIAGNOSIS — S83.91XA SPRAIN OF RIGHT KNEE, UNSPECIFIED LIGAMENT, INITIAL ENCOUNTER: Primary | ICD-10-CM

## 2022-01-14 PROCEDURE — 73562 X-RAY EXAM OF KNEE 3: CPT

## 2022-01-14 PROCEDURE — 99213 OFFICE O/P EST LOW 20 MIN: CPT | Performed by: PHYSICIAN ASSISTANT

## 2022-01-14 PROCEDURE — G0463 HOSPITAL OUTPT CLINIC VISIT: HCPCS | Performed by: PHYSICIAN ASSISTANT

## 2022-01-14 NOTE — PATIENT INSTRUCTIONS
Knee Sprain, Ambulatory Care   GENERAL INFORMATION:   A knee sprain  is caused by a stretched or torn ligament in your knee  Ligaments are tough tissues that connect bones  A knee sprain usually occurs during exercise or sports  Common symptoms include the following:   · Bruising or changes in skin color    · Inability to put weight on your leg    · Pain, tenderness, and swelling    · Stiffness and decreased knee and leg movement  Seek immediate care for the following symptoms:   · Cold or numbness below the injury, such as in your toes    · Decreased or loss of movement in your injured leg    · Increased pain, even after taking pain medicine  Treatment for a knee sprain  may include a support device, such as a brace  A brace helps limit movement and protect your knee  Treatment may also include pain medicine, physical therapy, or surgery if the ligament does not heal   Care for a knee sprain:   · Rest  your knee and limit movement for as long as directed  Use crutches as directed to take weight off your knee while it heals  · Apply ice  on your injured knee for 15 to 20 minutes every hour or as directed  Use an ice pack, or put crushed ice in a plastic bag  Cover it with a towel  Ice helps prevent tissue damage and decreases swelling and pain  · Compress  your injured knee as directed with an elastic bandage or brace to support your foot  Wear your brace for as many days as directed  · Elevate  your knee by lying down and resting it on pillows that are higher than your heart  This should be done as often as you can to help reduce swelling  · Exercise  your knee and leg as directed to improve your strength and help decrease stiffness  The exercises and physical therapy can help restore strength and increase the range of motion in your leg  Ask your healthcare provider when you can return to your normal activities or play sports    Prevent another knee sprain:   · Warm up and stretch before you exercise  · Do not exercise when you are tired or in pain  · Wear shoes that fit well and run on flat surfaces to prevent falls  · Wear equipment to protect yourself when you play sports  Follow up with your healthcare provider as directed:  Write down your questions so you remember to ask them during your visits  CARE AGREEMENT:   You have the right to help plan your care  Learn about your health condition and how it may be treated  Discuss treatment options with your caregivers to decide what care you want to receive  You always have the right to refuse treatment  The above information is an  only  It is not intended as medical advice for individual conditions or treatments  Talk to your doctor, nurse or pharmacist before following any medical regimen to see if it is safe and effective for you  © 2014 [address] is for End User's use only and may not be sold, redistributed or otherwise used for commercial purposes  All illustrations and images included in CareNotes® are the copyrighted property of [employer]  or Dalton Rios

## 2022-01-14 NOTE — PROGRESS NOTES
[facility]        NAME: Kaylin Hunt is a 62 y o  female  : 1960    MRN: [id number]  DATE: 2022  TIME: 3:41 AM    Assessment and Plan   Sprain of right knee, unspecified ligament, initial encounter [S83 [id number]]  1  Sprain of right knee, unspecified ligament, initial encounter  Ambulatory Referral to Orthopedic Surgery   2  Acute pain of right knee  XR knee 3 vw right non injury         Patient Instructions       Follow up with PCP in 3-5 days  Proceed to  ER if symptoms worsen  Chief Complaint     Chief Complaint   Patient presents with    Knee Pain     Patient presents with right knee pain and swelling for approx 1 week  She denies any injury  History of Present Illness       Symptoms started at the end of December when she was on vacation in [place]  Patient has taken Aleve and Tylenol without improvement in symptoms  Patient did have a previous injury several years ago to the patella of same knee  Patient notes pain worsens when she is standing for long periods of time  Review of Systems   Review of Systems   Constitutional: Negative for chills and fever  Musculoskeletal: Positive for arthralgias, joint swelling and myalgias  Skin: Negative for color change, rash and wound  Neurological: Negative for weakness and numbness           Current Medications       Current Outpatient Medications:     Ascorbic Acid (VITAMIN C) 100 MG tablet, Take 1,000 mg by mouth daily , Disp: , Rfl:     b complex vitamins capsule, Take 1 capsule by mouth daily, Disp: , Rfl:     carbidopa-levodopa (SINEMET)  mg per tablet, Take 1 tablet by mouth 4 (four) times a day, Disp: 360 tablet, Rfl: 2    clonazePAM (KlonoPIN) 0 5 mg tablet, Take 1 tablet (0 5 mg total) by mouth every evening, Disp: 30 tablet, Rfl: 5    Multiple Vitamins-Minerals (MULTIVITAMIN WITH MINERALS) tablet, Take 1 tablet by mouth daily, Disp: , Rfl:     trihexyphenidyl (ARTANE) 2 mg tablet, Take 1 tablet (2 mg total) by mouth 3 (three) times a day with meals, Disp: 270 tablet, Rfl: 2    DULoxetine (CYMBALTA) 60 mg delayed release capsule, Take 1 capsule (60 mg total) by mouth daily, Disp: 90 capsule, Rfl: 3    PARoxetine (PAXIL) 20 mg tablet, Take 1 tablet (20 mg total) by mouth daily, Disp: 90 tablet, Rfl: 3    Zoster Vac Recomb Adjuvanted (SHINGRIX) 50 MCG/0 5ML SUSR, 0 5mL IM for one dose, followed by 0 5mL IM 2-6 months after first dose (Patient not taking: Reported on 12/16/2021 ), Disp: 1 each, Rfl: 1    Current Allergies     Allergies as of 01/14/2022    (No Known Allergies)            The following portions of the patient's history were reviewed and updated as appropriate: allergies, current medications, past family history, past medical history, past social history, past surgical history and problem list      Past Medical History:   Diagnosis Date    Anxiety     [facility] )     bilateral    Depression     Parkinson disease ([facility] )     Squamous cell skin cancer 11/23/2020    SCCIS right clavicle       Past Surgical History:   Procedure Laterality Date    BREAST RECONSTRUCTION Bilateral     GANGLION CYST EXCISION Left     leg    MASTECTOMY Bilateral     MOHS SURGERY  01/06/2021    SCCIS right clavicle, Dr Cesia Kidd SKIN BIOPSY      TONSILLECTOMY         Family History   Problem Relation Age of Onset    Stroke Mother     Basal cell carcinoma Mother     Cancer Father     Stroke Brother          Medications have been verified  Objective   Pulse (!) 106   Temp 99 2 °F (37 3 °C)   Resp 16   Ht 5' 5" (1 651 m)   Wt 70 8 kg (156 lb)   LMP  (LMP Unknown)   SpO2 97%   BMI 25 96 kg/m²   No LMP recorded (lmp unknown)  Patient has had a hysterectomy  Physical Exam     Physical Exam  Vitals and nursing note reviewed  Constitutional:       General: She is not in acute distress  Appearance: She is well-developed  She is not diaphoretic     HENT:      Head: Normocephalic and atraumatic  Cardiovascular:      Pulses: Normal pulses  Pulmonary:      Effort: Pulmonary effort is normal    Musculoskeletal:         General: Normal range of motion  Right knee: No swelling, effusion, erythema, ecchymosis, lacerations, bony tenderness or crepitus  Normal range of motion  Tenderness present over the MCL  No LCL laxity, MCL laxity, ACL laxity or PCL laxity  Normal alignment, normal meniscus and normal patellar mobility  Normal pulse  Skin:     General: Skin is warm and dry  Capillary Refill: Capillary refill takes less than 2 seconds  Neurological:      Mental Status: She is alert and oriented to person, place, and time

## 2022-01-17 ENCOUNTER — TELEPHONE (OUTPATIENT)
Dept: OBGYN CLINIC | Facility: HOSPITAL | Age: 62
End: 2022-01-17

## 2022-01-17 NOTE — TELEPHONE ENCOUNTER
I received a Care Request from patient to schedule for:    Where Does it Hurt? Right knee  Are you considering joint replacement? No   Are you seeking a second opinion? No  If yes, who is your doctor? I lvm to cb to schedule

## 2022-01-18 ENCOUNTER — OFFICE VISIT (OUTPATIENT)
Dept: OBGYN CLINIC | Facility: CLINIC | Age: 62
End: 2022-01-18
Payer: MEDICARE

## 2022-01-18 ENCOUNTER — OFFICE VISIT (OUTPATIENT)
Dept: FAMILY MEDICINE CLINIC | Facility: CLINIC | Age: 62
End: 2022-01-18
Payer: MEDICARE

## 2022-01-18 VITALS
TEMPERATURE: 98.7 F | HEIGHT: 66 IN | OXYGEN SATURATION: 96 % | BODY MASS INDEX: 26.07 KG/M2 | HEART RATE: 97 BPM | SYSTOLIC BLOOD PRESSURE: 140 MMHG | RESPIRATION RATE: 18 BRPM | WEIGHT: 162.2 LBS | DIASTOLIC BLOOD PRESSURE: 82 MMHG

## 2022-01-18 VITALS
HEIGHT: 66 IN | DIASTOLIC BLOOD PRESSURE: 72 MMHG | SYSTOLIC BLOOD PRESSURE: 130 MMHG | BODY MASS INDEX: 26.2 KG/M2 | WEIGHT: 163 LBS

## 2022-01-18 DIAGNOSIS — F41.9 ANXIETY: ICD-10-CM

## 2022-01-18 DIAGNOSIS — M23.91 LOCKING KNEE, RIGHT: ICD-10-CM

## 2022-01-18 DIAGNOSIS — M23.91 ACUTE INTERNAL DERANGEMENT OF RIGHT KNEE: Primary | ICD-10-CM

## 2022-01-18 DIAGNOSIS — F32.0 MAJOR DEPRESSIVE DISORDER, SINGLE EPISODE, MILD (HCC): ICD-10-CM

## 2022-01-18 DIAGNOSIS — G20 PARKINSON DISEASE (HCC): ICD-10-CM

## 2022-01-18 DIAGNOSIS — M25.561 ACUTE PAIN OF RIGHT KNEE: Primary | ICD-10-CM

## 2022-01-18 DIAGNOSIS — M25.561 ACUTE PAIN OF RIGHT KNEE: ICD-10-CM

## 2022-01-18 DIAGNOSIS — M25.461 EFFUSION OF RIGHT KNEE: ICD-10-CM

## 2022-01-18 PROCEDURE — 99204 OFFICE O/P NEW MOD 45 MIN: CPT | Performed by: PHYSICIAN ASSISTANT

## 2022-01-18 PROCEDURE — 20610 DRAIN/INJ JOINT/BURSA W/O US: CPT | Performed by: PHYSICIAN ASSISTANT

## 2022-01-18 PROCEDURE — 99214 OFFICE O/P EST MOD 30 MIN: CPT | Performed by: FAMILY MEDICINE

## 2022-01-18 RX ORDER — BUPIVACAINE HYDROCHLORIDE 2.5 MG/ML
2 INJECTION, SOLUTION INFILTRATION; PERINEURAL
Status: COMPLETED | OUTPATIENT
Start: 2022-01-18 | End: 2022-01-18

## 2022-01-18 RX ORDER — TRIAMCINOLONE ACETONIDE 40 MG/ML
80 INJECTION, SUSPENSION INTRA-ARTICULAR; INTRAMUSCULAR
Status: COMPLETED | OUTPATIENT
Start: 2022-01-18 | End: 2022-01-18

## 2022-01-18 RX ORDER — PAROXETINE HYDROCHLORIDE 20 MG/1
20 TABLET, FILM COATED ORAL DAILY
Qty: 90 TABLET | Refills: 3 | Status: SHIPPED | OUTPATIENT
Start: 2022-01-18

## 2022-01-18 RX ORDER — DULOXETIN HYDROCHLORIDE 60 MG/1
60 CAPSULE, DELAYED RELEASE ORAL DAILY
Qty: 90 CAPSULE | Refills: 3 | Status: SHIPPED | OUTPATIENT
Start: 2022-01-18

## 2022-01-18 RX ADMIN — TRIAMCINOLONE ACETONIDE 80 MG: 40 INJECTION, SUSPENSION INTRA-ARTICULAR; INTRAMUSCULAR at 09:41

## 2022-01-18 RX ADMIN — BUPIVACAINE HYDROCHLORIDE 2 ML: 2.5 INJECTION, SOLUTION INFILTRATION; PERINEURAL at 09:41

## 2022-01-18 NOTE — PROGRESS NOTES
Orthopaedic Surgery - Office Note  Clearance Nelia (76 y o  female)   : 1960   MRN: [id number]  Encounter Date: 2022    Chief Complaint   Patient presents with    Right Knee - Pain         Assessment/Plan  Diagnoses and all orders for this visit:    Acute internal derangement of right knee    Locking knee, right  -     Ambulatory Referral to Orthopedic Surgery    Acute pain of right knee  -     Ambulatory Referral to Physical Therapy; Future    Effusion of right knee  -     Ambulatory Referral to Physical Therapy; Future    Other orders  -     Large joint arthrocentesis    I discussed with the patient she has a likely acute lateral meniscus tear  Conservative versus surgical options were reviewed with the patient  I advised her if she was considering surgery as a 1st line treatment we could proceed with an MRI to evaluate for lateral meniscus tear and follow-up with the orthopedic surgeon  She however wish to consider a more conservative approach 7 aspiration and cortisone injection was performed today in combination with starting formal physical therapy  She will use an oral anti-inflammatory such as Aleve 1 tablet twice daily with food stopping calling if any stomach upset occurs  She will ice the knee 20 minutes on 1 hour off 3 times a day  The Echo hinged knee sleeve she was provided may be used for comfort only but not necessary  She will follow-up in 3 weeks with Dr Isaacs to discuss treatment options and consider an MRI for surgical planning if conservative treatments have failed  Return 2-3 weeks with Dr Isaacs  History of Present Illness  This is a new patient to me with right knee pain  She has had pain and swelling for approximately 10 days  No trauma is reported, but she did start wearing modified boot  while hiking in [place] due to her Parkinson's  She states that this altered gait did correlate with the onset of her pain in the lateral aspect of the knee    She has had swelling since the onset of pain     She presented to [facility] where she had x-rays taken that are available for review  The symptoms started while vacationing in [place]  Tylenol has not helped her symptoms  She does report history of patella injury many years ago  She has been using and Echo hinged knee brace without relief  Patient does report a clicking in the outside of the knee with an occasional sharp pain and locking  She reports the symptoms occur once or twice a day  Patient was COVID positive on 01/05/2022      Review of Systems  Pertinent items are noted in HPI  All other systems were reviewed and are negative  Physical Exam  /72   Ht 5' 6" (1 676 m)   Wt 73 9 kg (163 lb)   LMP  (LMP Unknown)   BMI 26 31 kg/m²   Cons: Appears well  No apparent distress  Psych: Alert  Oriented x3  Mood and affect normal   Eyes: PERRLA, EOMI  Resp: Normal effort  No audible wheezing or stridor  CV: Palpable pulse  No discernable arrhythmia  Lymph:  No palpable cervical, axillary, or inguinal lymphadenopathy  Skin: Warm  No palpable masses  No visible lesions  Neuro: Normal muscle tone  Normal and symmetric DTR's  Right knee has a 1+ intra-articular effusion  She has marked lateral joint line tenderness with a positive lateral Leonardo's  She is nontender in the medial joint line  She has no instability to valgus or varus stress  She has no instability to Lachman's and posterior drawer  Quad and hamstring strength are at 5-out of 5  Patella is tracking midline without patella apprehension  She does have a resting tremor and mild rigidity consistent with Parkinson's diagnosis      Studies Reviewed  Study Result    Narrative & Impression   RIGHT KNEE     INDICATION:   M25 561: Pain in right knee      COMPARISON:  None     VIEWS:  XR KNEE 3 VW RIGHT NON INJURY         FINDINGS:     There is no acute fracture or dislocation      There is no joint effusion      No significant degenerative changes      No lytic or blastic osseous lesion      Soft tissues are unremarkable      IMPRESSION:     No acute osseous abnormality            Workstation performed: [id number]     X-ray images as well as reports were reviewed by myself in the office today  Urgent care notes from 01/14/2022 were reviewed by myself in the office today  Large joint arthrocentesis: R knee  Universal Protocol:  Consent: Verbal consent obtained  Risks and benefits: risks, benefits and alternatives were discussed  Consent given by: patient  Time out: Immediately prior to procedure a "time out" was called to verify the correct patient, procedure, equipment, support staff and site/side marked as required  Patient understanding: patient states understanding of the procedure being performed  Patient consent: the patient's understanding of the procedure matches consent given  Procedure consent: procedure consent matches procedure scheduled  Relevant documents: relevant documents present and verified  Test results: test results available and properly labeled  Site marked: the operative site was marked  Radiology Images displayed and confirmed   If images not available, report reviewed: imaging studies available  Patient identity confirmed: verbally with patient    Supporting Documentation  Indications: pain and joint swelling   Procedure Details  Location: knee - R knee  Preparation: Patient was prepped and draped in the usual sterile fashion  Needle size: 18 G  Ultrasound guidance: no  Approach: lateral  Medications administered: 2 mL bupivacaine 0 25 %; 80 mg triamcinolone acetonide 40 mg/mL    Aspirate amount: 30 mL  Aspirate: clear and yellow    Patient tolerance: patient tolerated the procedure well with no immediate complications  Dressing:  Sterile dressing applied        Medical, Surgical, Family, and Social History  The patient's medical history, family history, and social history, were reviewed and updated as appropriate      Past Medical History:   Diagnosis Date    Anxiety     Breast cancer ([facility] [place] [address] )     bilateral    Depression     Parkinson disease ([facility] [address] [facility]     Squamous cell skin cancer 11/23/2020    SCCIS right clavicle       Past Surgical History:   Procedure Laterality Date    BREAST RECONSTRUCTION Bilateral     GANGLION CYST EXCISION Left     leg    MASTECTOMY Bilateral     MOHS SURGERY  01/06/2021    SCCIS right clavicle, Dr Abdi Boswell History   Problem Relation Age of Onset    Stroke Mother     Basal cell carcinoma Mother     Cancer Father     Stroke Brother        Social History     Occupational History    Not on file   Tobacco Use    Smoking status: Never Smoker    Smokeless tobacco: Never Used   Vaping Use    Vaping Use: Never used   Substance and Sexual Activity    Alcohol use: Yes     Comment: rare    Drug use: Never    Sexual activity: Not Currently       No Known Allergies      Current Outpatient Medications:     amoxicillin (AMOXIL) 500 mg capsule, TAKE 1 CAPSULE BY MOUTH EVERY 8 HOURS UNTIL GONE (Patient not taking: Reported on 9/16/2021), Disp: , Rfl:     Ascorbic Acid (VITAMIN C) 100 MG tablet, Take 1,000 mg by mouth daily , Disp: , Rfl:     b complex vitamins capsule, Take 1 capsule by mouth daily, Disp: , Rfl:     carbidopa-levodopa (SINEMET)  mg per tablet, Take 1 tablet by mouth 4 (four) times a day, Disp: 360 tablet, Rfl: 2    clonazePAM (KlonoPIN) 0 5 mg tablet, Take 1 tablet (0 5 mg total) by mouth every evening, Disp: 30 tablet, Rfl: 5    DULoxetine (CYMBALTA) 60 mg delayed release capsule, Take 1 capsule (60 mg total) by mouth daily, Disp: 90 capsule, Rfl: 1    Multiple Vitamins-Minerals (MULTIVITAMIN WITH MINERALS) tablet, Take 1 tablet by mouth daily, Disp: , Rfl:     PARoxetine (PAXIL) 20 mg tablet, Take 1 tablet (20 mg total) by mouth daily, Disp: 90 tablet, Rfl: 1    trihexyphenidyl (ARTANE) 2 mg tablet, Take 1 tablet (2 mg total) by mouth 3 (three) times a day with meals, Disp: 270 tablet, Rfl: 2    Zoster Vac Recomb Adjuvanted (SHINGRIX) 50 MCG/0 5ML SUSR, 0 5mL IM for one dose, followed by 0 5mL IM 2-6 months after first dose (Patient not taking: Reported on 12/16/2021 ), Disp: 1 each, Rfl: [address] [facility] , [place]

## 2022-01-18 NOTE — PATIENT INSTRUCTIONS
Meniscus Tear   WHAT YOU NEED TO KNOW:   A meniscus tear is a tear in the cartilage of your knee  The meniscus is a piece of cartilage (strong tissue) between your thighbone and shinbone  The meniscus helps to cushion your knee joint and keep it stable  DISCHARGE INSTRUCTIONS:   Call 911 for any of the following:   · Your arm or leg feels warm, tender, and painful  It may look swollen and red  Return to the emergency department if:   · You cannot move your knee at all  Contact your healthcare provider if:   · Your symptoms do not improve with treatment  · You have questions or concerns about your condition or care  Medicines:   · NSAIDs , such as ibuprofen, help decrease swelling, pain, and fever  This medicine is available with or without a doctor's order  NSAIDs can cause stomach bleeding or kidney problems in certain people  If you take blood thinner medicine, always ask if NSAIDs are safe for you  Always read the medicine label and follow directions  Do not give these medicines to children under 10months of age without direction from your child's healthcare provider  · Take your medicine as directed  Contact your healthcare provider if you think your medicine is not helping or if you have side effects  Tell him of her if you are allergic to any medicine  Keep a list of the medicines, vitamins, and herbs you take  Include the amounts, and when and why you take them  Bring the list or the pill bottles to follow-up visits  Carry your medicine list with you in case of an emergency  Follow up with your healthcare provider as directed:  Write down your questions so you remember to ask them during your visits  Self-care:   · Rest  your knee  Avoid activities that make the swelling or pain worse  You may need to avoid putting weight on your leg while you have pain  Your healthcare provider may recommend that you use crutches      · Apply ice  on your knee for 15 to 20 minutes every hour or as directed  Use an ice pack, or put crushed ice in a plastic bag  Cover it with a towel  Ice helps prevent tissue damage and decreases swelling and pain  · Compress  your knee with an elastic bandage, air cast, medical boot, or splint to reduce swelling  Ask your healthcare provider which compression device to use, and how tight it should be  · Elevate  your knee above the level of your heart as often as you can  This will help decrease swelling and pain  Prop your knee on pillows or blankets to keep it elevated comfortably  © Copyright [employer] 2021 Information is for End User's use only and may not be sold, redistributed or otherwise used for commercial purposes  All illustrations and images included in CareNotes® are the copyrighted property of [employer]  or Alma Rosa Baker  The above information is an  only  It is not intended as medical advice for individual conditions or treatments  Talk to your doctor, nurse or pharmacist before following any medical regimen to see if it is safe and effective for you

## 2022-01-18 NOTE — PROGRESS NOTES
Kaylin Hunt 1960 female MRN: [id number]    Family Medicine Acute Visit    ASSESSMENT/PLAN  Problem List Items Addressed This Visit        Nervous and Auditory    Parkinson disease ([address] )       Other    Major depressive disorder, single episode, mild (HCC)    Relevant Medications    DULoxetine (CYMBALTA) 60 mg delayed release capsule    PARoxetine (PAXIL) 20 mg tablet    Anxiety    Relevant Medications    PARoxetine (PAXIL) 20 mg tablet    Right knee pain - Primary     Saw ortho this morning, had injection and will be starting with PT               Medications stable, refilled today  Continue ortho and PT follow up  Keep apt with neuro for routine follow up      Future Appointments   Date Time Provider Lillian Powell   1/18/2022 10:30 AM [facility]   1/24/2022 11:30 AM Misti Campbell, PT [facility]   2/8/2022  9:30 AM Amanda Dempsey MD ORTHO [facility]   3/23/2022 12:30 PM Jessie Melendez MD NEURO [facility]          SUBJECTIVE  CC: Knee Pain (right knee pain )      HPI:  Kaylin Hunt is a 62 y o  female who presents for acute visit,  Reports she just saw ortho and she torn her meniscus when she was in [place] on the snow  She was given an injection and has PT and ortho follow up scheduled  Review of Systems   Constitutional: Negative for chills, fatigue and fever  HENT: Negative for congestion, postnasal drip, rhinorrhea and sinus pressure  Eyes: Negative for photophobia and visual disturbance  Respiratory: Negative for cough and shortness of breath  Cardiovascular: Negative for chest pain, palpitations and leg swelling  Gastrointestinal: Negative for abdominal pain, constipation, diarrhea, nausea and vomiting  Genitourinary: Negative for difficulty urinating and dysuria  Musculoskeletal: Positive for arthralgias  Negative for myalgias  Skin: Negative for color change and rash     Neurological: Negative for dizziness, weakness, light-headedness and headaches         Historical Information   The patient history was reviewed as follows:  Past Medical History:   Diagnosis Date    Anxiety     Breast cancer ([facility] [address] )     bilateral    Depression     Parkinson disease ([facility] [address] [facility]     Squamous cell skin cancer 11/23/2020    SCCIS right clavicle         Past Surgical History:   Procedure Laterality Date    BREAST RECONSTRUCTION Bilateral     GANGLION CYST EXCISION Left     leg    MASTECTOMY Bilateral     MOHS SURGERY  01/06/2021    SCCIS right clavicle, Dr Cesia Soto       Family History   Problem Relation Age of Onset    Stroke Mother     Basal cell carcinoma Mother     Cancer Father     Stroke Brother       Social History   Social History     Substance and Sexual Activity   Alcohol Use Yes    Comment: rare     Social History     Substance and Sexual Activity   Drug Use Never     Social History     Tobacco Use   Smoking Status Never Smoker   Smokeless Tobacco Never Used       Medications:     Current Outpatient Medications:     Ascorbic Acid (VITAMIN C) 100 MG tablet, Take 1,000 mg by mouth daily , Disp: , Rfl:     b complex vitamins capsule, Take 1 capsule by mouth daily, Disp: , Rfl:     carbidopa-levodopa (SINEMET)  mg per tablet, Take 1 tablet by mouth 4 (four) times a day, Disp: 360 tablet, Rfl: 2    clonazePAM (KlonoPIN) 0 5 mg tablet, Take 1 tablet (0 5 mg total) by mouth every evening, Disp: 30 tablet, Rfl: 5    DULoxetine (CYMBALTA) 60 mg delayed release capsule, Take 1 capsule (60 mg total) by mouth daily, Disp: 90 capsule, Rfl: 3    Multiple Vitamins-Minerals (MULTIVITAMIN WITH MINERALS) tablet, Take 1 tablet by mouth daily, Disp: , Rfl:     PARoxetine (PAXIL) 20 mg tablet, Take 1 tablet (20 mg total) by mouth daily, Disp: 90 tablet, Rfl: 3    trihexyphenidyl (ARTANE) 2 mg tablet, Take 1 tablet (2 mg total) by mouth 3 (three) times a day with meals, Disp: 270 tablet, Rfl: 2   Zoster Vac Recomb Adjuvanted (SHINGRIX) 50 MCG/0 5ML SUSR, 0 5mL IM for one dose, followed by 0 5mL IM 2-6 months after first dose (Patient not taking: Reported on 12/16/2021 ), Disp: 1 each, Rfl: 1  No current facility-administered medications for this visit  No Known Allergies    OBJECTIVE  Vitals:   Vitals:    01/18/22 1007 01/18/22 1019   BP: 140/96 140/82   BP Location: Left arm    Patient Position: Sitting    Cuff Size: Standard    Pulse: 97    Resp: 18    Temp: 98 7 °F (37 1 °C)    TempSrc: Tympanic    SpO2: 96%    Weight: 73 6 kg (162 lb 3 2 oz)    Height: 5' 6" (1 676 m)          Physical Exam  Constitutional:       Appearance: She is well-developed  HENT:      Head: Normocephalic and atraumatic  Eyes:      Extraocular Movements: Extraocular movements intact  Conjunctiva/sclera: Conjunctivae normal    Cardiovascular:      Rate and Rhythm: Normal rate and regular rhythm  Heart sounds: Normal heart sounds  Pulmonary:      Effort: Pulmonary effort is normal  No respiratory distress  Breath sounds: Normal breath sounds  No wheezing  Musculoskeletal:         General: No tenderness  Normal range of motion  Cervical back: Normal range of motion and neck supple  Skin:     General: Skin is warm and dry  Neurological:      Mental Status: She is alert and oriented to person, place, and time     Psychiatric:         Mood and Affect: Mood normal          Behavior: Behavior normal                     Kaz Pool, DO    1/18/2022

## 2022-01-21 NOTE — PROGRESS NOTES
PT Evaluation     Today's date: 2022  Patient name: Shanell Cruz  : 1960  MRN: [id number]  Referring provider: FLOR Alegria*  Dx:   Encounter Diagnosis     ICD-10-CM    1  Chronic pain of right knee  M25 561     G89 29    2  Acute pain of right knee  M25 561 Ambulatory Referral to Physical Therapy   3  Effusion of right knee  M25 461 Ambulatory Referral to Physical Therapy                  Assessment  Assessment details: Pedro Pablo Fernandez is a 62year old female presenting to physical therapy with right knee pain  Patients presentation upon initial evaluation seems to agree with her referring diagnosis of a possible meniscus tear  Patient presents with pain, decreased strength, decreased range of motion and decreased tolerance to activity  Due to these impairments patient has difficulty performing activities of daily living, recreational activities and engaging in social activities  Patient would benefit from skilled physical therapy services to address these impairments and to maximize function  Thank you for the referral    Impairments: abnormal gait, abnormal or restricted ROM, activity intolerance, impaired balance, impaired physical strength, lacks appropriate home exercise program, pain with function and weight-bearing intolerance  Understanding of Dx/Px/POC: excellent  Goals  Impairment Goals:  1 ) Pt will have decreased sx at worst by 50% in 2-4 weeks  2 ) Pt will have improved knee range of motion to WNL without pain in 3-4 weeks  3 ) Pt will have improved knee and hip strength by 1/2 MMT in 4-6 weeks  4 ) Pt will have decreased tenderness to palpation to medial and lateral joint line in 3-4 weeks  Functional Goals:  1 ) Pt will be independent in their home exercise program in 1 week  2 ) Pt will be able to traverse stairs without pain in 3-4 weeks  3 ) Pt will have an improved FOTO score of 75/100 in 6-8 weeks    4 ) Pt will be able to walk for 30 minutes without pain in 3-4 weeks   5 ) Pt will be able to hike without pain in 6-8 weeks  Plan  Patient would benefit from: skilled PT  Planned therapy interventions: joint mobilization, manual therapy, patient education, therapeutic exercise, gait training, flexibility, home exercise program, behavior modification, neuromuscular re-education, graded exercise and graded activity  Frequency: 2x week  Duration in weeks: 8  Plan of Care beginning date: 2022  Plan of Care expiration date: 3/21/2022        Subjective Evaluation    History of Present Illness  Mechanism of injury: Tonio Chavez is a 62year old female presenting to physical therapy with right knee pain  She notes that while she was on vacation in [place] she was wearing  on the bottoms of her shoes, which changed the way she was walking  She was also trekking through deep snow, about thigh height  This correlated with the onset of her knee pain  She noticed swelling for about 10 days  She denies any falls or slips  She was the orthopedic and was diagnosed with a possible lateral meniscus tear  She had a cortisone injection on 2022 with near complete relief  She does still get some popping and occasional locking up in the knee  Initially her pain was anterior, medial and lateral noted as intense and sharp pain  She also noted the the medial and lateral aspect of the knee joint were very tender  At rest, she has no pain  Her pain gets worse as the day goes on  She admits that she is nervous of falling or injuring the knee further  She has a history of knee problems on the right, 10 years ago she had arthroscopic surgery to remove fragment of her patellar  She also was diagnosed with Parkinson Disease 7 years ago       Aggs: stairs, prolonged standing, walking, squatting down  Eases: Rest, knee sleeve, cortisone  Pain  Current pain ratin  At best pain ratin  At worst pain ratin  Quality: sharp and dull ache  Relieving factors: medications, change in position and rest  Aggravating factors: stair climbing, walking, standing and running  Progression: improved    Patient Goals  Patient goals for therapy: decreased pain, increased motion, return to sport/leisure activities and increased strength          Objective     Tenderness     Right Knee   Tenderness in the lateral joint line, medial joint line and medial patella  No tenderness in the inferior patella, ITB, lateral patella, LCL (distal), LCL (proximal), MCL (distal) and MCL (proximal)  Active Range of Motion   Left Knee   Flexion: 150 degrees   Extension: 0 degrees     Right Knee   Flexion: 135 degrees with pain  Extension: 0 degrees     Passive Range of Motion   Left Knee   Flexion: 150 degrees   Extension: 0 degrees     Right Knee   Flexion: 140 degrees with pain  Extension: 0 degrees     Mobility   Patellar Mobility:     Right Knee   WFL: medial, lateral, superior and inferior    Strength/Myotome Testing     Left Hip   Planes of Motion   Extension: 4-  Abduction: 4-    Right Hip   Planes of Motion   Extension: 4-  Abduction: 4-    Left Knee   Flexion: 4+  Extension: 4+    Right Knee   Flexion: 4  Extension: 4    Tests     Right Knee   Positive lateral Leonardo and medial Leonardo  Negative anterior Lachman, posterior drawer, Thessaly's test at 5 degrees, valgus stress test at 0 degrees and valgus stress test at 30 degrees  Additional Tests Details  HS 90/90 WNL B  Prone knee flexion WNL B    Swelling     Left Knee Girth Measurement (cm)   Joint line: 38 5 cm  10 cm above joint line: 42 cm  10 cm below joint line: 37 cm    Right Knee Girth Measurement (cm)   Joint line: 38 5 cm  10 cm above joint line: 42 cm  10 cm below joint line: 37 cm    Ambulation     Observational Gait   Decreased right stance time  Functional Assessment      Squat    Pain and trunk lean left         Flowsheet Rows      Most Recent Value   PT/OT G-Codes    Current Score 57   Projected Score 65             Precautions: Parkinson Disease, Depression      Manuals                                                                 Neuro Re-Ed             Tandem balance             Wobble board                                                                              Ther Ex             Bridges with TB 2x10 GTB            SL Clamshells 2x10 GTB            SLR 2x10            Hip extension - SLR 2x10            HR/TR 2x10 ea            Recumbent bike             Mini squat                          Ther Activity             STS                          Gait Training                                       Modalities

## 2022-01-24 ENCOUNTER — EVALUATION (OUTPATIENT)
Dept: PHYSICAL THERAPY | Facility: CLINIC | Age: 62
End: 2022-01-24
Payer: MEDICARE

## 2022-01-24 DIAGNOSIS — G89.29 CHRONIC PAIN OF RIGHT KNEE: Primary | ICD-10-CM

## 2022-01-24 DIAGNOSIS — M25.561 ACUTE PAIN OF RIGHT KNEE: ICD-10-CM

## 2022-01-24 DIAGNOSIS — M25.561 CHRONIC PAIN OF RIGHT KNEE: Primary | ICD-10-CM

## 2022-01-24 DIAGNOSIS — M25.461 EFFUSION OF RIGHT KNEE: ICD-10-CM

## 2022-01-24 PROCEDURE — 97110 THERAPEUTIC EXERCISES: CPT | Performed by: PHYSICAL THERAPIST

## 2022-01-24 PROCEDURE — 97161 PT EVAL LOW COMPLEX 20 MIN: CPT | Performed by: PHYSICAL THERAPIST

## 2022-01-24 NOTE — LETTER
2022    [place] [id number]    Patient: Ariadne Gunter   YOB: 1960   Date of Visit: 2022     Encounter Diagnosis     ICD-10-CM    1  Chronic pain of right knee  M25 561     G89 29    2  Acute pain of right knee  M25 561 Ambulatory Referral to Physical Therapy   3  Effusion of right knee  M25 461 Ambulatory Referral to Physical Therapy       Dear Dr Gerald Samuels: Thank you for your recent referral of Ariadne Gunter  Please review the attached evaluation summary from Jadyn's recent visit  Please verify that you agree with the plan of care by signing the attached order  If you have any questions or concerns, please do not hesitate to call  I sincerely appreciate the opportunity to share in the care of one of your patients and hope to have another opportunity to work with you in the near future  Sincerely,    Bobbi Montero, PT      Referring Provider:      I certify that I have read the below Plan of Care and certify the need for these services furnished under this plan of treatment while under my care  [place] [id number]  Via Fax: [phone]          PT Evaluation     Today's date: 2022  Patient name: Ariadne Gunter  : 1960  MRN: [id number]  Referring provider: FLOR Ventura*  Dx:   Encounter Diagnosis     ICD-10-CM    1  Chronic pain of right knee  M25 561     G89 29    2  Acute pain of right knee  M25 561 Ambulatory Referral to Physical Therapy   3  Effusion of right knee  M25 461 Ambulatory Referral to Physical Therapy                  Assessment  Assessment details: Jose Enrique Hallman is a 62year old female presenting to physical therapy with right knee pain  Patients presentation upon initial evaluation seems to agree with her referring diagnosis of a possible meniscus tear   Patient presents with pain, decreased strength, decreased range of motion and decreased tolerance to activity  Due to these impairments patient has difficulty performing activities of daily living, recreational activities and engaging in social activities  Patient would benefit from skilled physical therapy services to address these impairments and to maximize function  Thank you for the referral    Impairments: abnormal gait, abnormal or restricted ROM, activity intolerance, impaired balance, impaired physical strength, lacks appropriate home exercise program, pain with function and weight-bearing intolerance  Understanding of Dx/Px/POC: excellent  Goals  Impairment Goals:  1 ) Pt will have decreased sx at worst by 50% in 2-4 weeks  2 ) Pt will have improved knee range of motion to WNL without pain in 3-4 weeks  3 ) Pt will have improved knee and hip strength by 1/2 MMT in 4-6 weeks  4 ) Pt will have decreased tenderness to palpation to medial and lateral joint line in 3-4 weeks  Functional Goals:  1 ) Pt will be independent in their home exercise program in 1 week  2 ) Pt will be able to traverse stairs without pain in 3-4 weeks  3 ) Pt will have an improved FOTO score of 75/100 in 6-8 weeks  4 ) Pt will be able to walk for 30 minutes without pain in 3-4 weeks  5 ) Pt will be able to hike without pain in 6-8 weeks  Plan  Patient would benefit from: skilled PT  Planned therapy interventions: joint mobilization, manual therapy, patient education, therapeutic exercise, gait training, flexibility, home exercise program, behavior modification, neuromuscular re-education, graded exercise and graded activity  Frequency: 2x week  Duration in weeks: 8  Plan of Care beginning date: 1/24/2022  Plan of Care expiration date: 3/21/2022        Subjective Evaluation    History of Present Illness  Mechanism of injury: Velna Hodgkins is a 62year old female presenting to physical therapy with right knee pain   She notes that while she was on vacation in [place] she was wearing  on the bottoms of her shoes, which changed the way she was walking  She was also trekking through deep snow, about thigh height  This correlated with the onset of her knee pain  She noticed swelling for about 10 days  She denies any falls or slips  She was the orthopedic and was diagnosed with a possible lateral meniscus tear  She had a cortisone injection on 2022 with near complete relief  She does still get some popping and occasional locking up in the knee  Initially her pain was anterior, medial and lateral noted as intense and sharp pain  She also noted the the medial and lateral aspect of the knee joint were very tender  At rest, she has no pain  Her pain gets worse as the day goes on  She admits that she is nervous of falling or injuring the knee further  She has a history of knee problems on the right, 10 years ago she had arthroscopic surgery to remove fragment of her patellar  She also was diagnosed with Parkinson Disease 7 years ago  Aggs: stairs, prolonged standing, walking, squatting down  Eases: Rest, knee sleeve, cortisone  Pain  Current pain ratin  At best pain ratin  At worst pain ratin  Quality: sharp and dull ache  Relieving factors: medications, change in position and rest  Aggravating factors: stair climbing, walking, standing and running  Progression: improved    Patient Goals  Patient goals for therapy: decreased pain, increased motion, return to sport/leisure activities and increased strength          Objective     Tenderness     Right Knee   Tenderness in the lateral joint line, medial joint line and medial patella  No tenderness in the inferior patella, ITB, lateral patella, LCL (distal), LCL (proximal), MCL (distal) and MCL (proximal)       Active Range of Motion   Left Knee   Flexion: 150 degrees   Extension: 0 degrees     Right Knee   Flexion: 135 degrees with pain  Extension: 0 degrees     Passive Range of Motion   Left Knee   Flexion: 150 degrees   Extension: 0 degrees     Right Knee   Flexion: 140 degrees with pain  Extension: 0 degrees     Mobility   Patellar Mobility:     Right Knee   WFL: medial, lateral, superior and inferior    Strength/Myotome Testing     Left Hip   Planes of Motion   Extension: 4-  Abduction: 4-    Right Hip   Planes of Motion   Extension: 4-  Abduction: 4-    Left Knee   Flexion: 4+  Extension: 4+    Right Knee   Flexion: 4  Extension: 4    Tests     Right Knee   Positive lateral Leonardo and medial Leonardo  Negative anterior Lachman, posterior drawer, Thessaly's test at 5 degrees, valgus stress test at 0 degrees and valgus stress test at 30 degrees  Additional Tests Details  HS 90/90 WNL B  Prone knee flexion WNL B    Swelling     Left Knee Girth Measurement (cm)   Joint line: 38 5 cm  10 cm above joint line: 42 cm  10 cm below joint line: 37 cm    Right Knee Girth Measurement (cm)   Joint line: 38 5 cm  10 cm above joint line: 42 cm  10 cm below joint line: 37 cm    Ambulation     Observational Gait   Decreased right stance time  Functional Assessment      Squat    Pain and trunk lean left         Flowsheet Rows      Most Recent Value   PT/OT G-Codes    Current Score 57   Projected Score 65             Precautions: Parkinson Disease, Depression      Manuals                                                                 Neuro Re-Ed             Tandem balance             Wobble board                                                                              Ther Ex             Bridges with TB 2x10 GTB            SL Clamshells 2x10 GTB            SLR 2x10            Hip extension - SLR 2x10            HR/TR 2x10 ea            Recumbent bike             Mini squat                          Ther Activity             STS                          Gait Training                                       Modalities

## 2022-01-27 DIAGNOSIS — G20 PARKINSON DISEASE (HCC): ICD-10-CM

## 2022-01-27 RX ORDER — TRIHEXYPHENIDYL HYDROCHLORIDE 2 MG/1
TABLET ORAL
Qty: 270 TABLET | Refills: 0 | Status: SHIPPED | OUTPATIENT
Start: 2022-01-27 | End: 2022-04-26 | Stop reason: SDUPTHER

## 2022-01-31 ENCOUNTER — OFFICE VISIT (OUTPATIENT)
Dept: PHYSICAL THERAPY | Facility: CLINIC | Age: 62
End: 2022-01-31
Payer: MEDICARE

## 2022-01-31 DIAGNOSIS — M25.561 CHRONIC PAIN OF RIGHT KNEE: Primary | ICD-10-CM

## 2022-01-31 DIAGNOSIS — M25.561 ACUTE PAIN OF RIGHT KNEE: ICD-10-CM

## 2022-01-31 DIAGNOSIS — M25.461 EFFUSION OF RIGHT KNEE: ICD-10-CM

## 2022-01-31 DIAGNOSIS — G89.29 CHRONIC PAIN OF RIGHT KNEE: Primary | ICD-10-CM

## 2022-01-31 PROCEDURE — 97112 NEUROMUSCULAR REEDUCATION: CPT | Performed by: PHYSICAL THERAPIST

## 2022-01-31 PROCEDURE — 97110 THERAPEUTIC EXERCISES: CPT | Performed by: PHYSICAL THERAPIST

## 2022-01-31 NOTE — PROGRESS NOTES
Daily Note     Today's date: 2022  Patient name: Ariadne Gunter  : 1960  MRN: [id number]  Referring provider: FLOR Ventura*  Dx:   Encounter Diagnosis     ICD-10-CM    1  Chronic pain of right knee  M25 561     G89 29    2  Acute pain of right knee  M25 561    3  Effusion of right knee  M25 461                   Subjective: Jose Enrique Hallman explains that her exercises are going well  Objective: See treatment diary below      Assessment: Tolerated treatment well  Patient demonstrated fatigue post treatment  Jose Enrique Hallman notes some clicking in the knee but a great sense of accomplishment and exercise during recumbent bike use  Cueing to correct foot position to shoulder width during bridging and squats, updated HEP to include changes made to today's session  Plan: Continue per plan of care        Precautions: Parkinson Disease, Depression      Manuals                                                                 Neuro Re-Ed             Tandem balance  5x15'' eyes closed           Wobble board  nv                                                                            Ther Ex             Bridges with TB 2x10 GTB 2x10 GTB           SL Clamshells 2x10 GTB 2x10 GTB           SLR 2x10 2x10           Hip extension - SLR 2x10 2x10           HR/TR 2x10 ea 2x10 ea           Recumbent bike  10' lvl 0           Mini squat  2x10                        Ther Activity             STS  x10 on chair                        Gait Training                                       Modalities

## 2022-02-03 ENCOUNTER — OFFICE VISIT (OUTPATIENT)
Dept: PHYSICAL THERAPY | Facility: CLINIC | Age: 62
End: 2022-02-03
Payer: MEDICARE

## 2022-02-03 DIAGNOSIS — G89.29 CHRONIC PAIN OF RIGHT KNEE: Primary | ICD-10-CM

## 2022-02-03 DIAGNOSIS — M25.561 CHRONIC PAIN OF RIGHT KNEE: Primary | ICD-10-CM

## 2022-02-03 DIAGNOSIS — M25.561 ACUTE PAIN OF RIGHT KNEE: ICD-10-CM

## 2022-02-03 DIAGNOSIS — M25.461 EFFUSION OF RIGHT KNEE: ICD-10-CM

## 2022-02-03 PROCEDURE — 97110 THERAPEUTIC EXERCISES: CPT | Performed by: PHYSICAL THERAPIST

## 2022-02-03 PROCEDURE — 97112 NEUROMUSCULAR REEDUCATION: CPT | Performed by: PHYSICAL THERAPIST

## 2022-02-03 NOTE — PROGRESS NOTES
Daily Note     Today's date: 2/3/2022  Patient name: Marleni Hernandez  : 1960  MRN: [id number]  Referring provider: FLOR Desir*  Dx:   Encounter Diagnosis     ICD-10-CM    1  Chronic pain of right knee  M25 561     G89 29    2  Acute pain of right knee  M25 561    3  Effusion of right knee  M25 461                   Subjective: Sincere Farrar explains that her right knee was a little "achy" after last session, she has been consistent with her HEP  Objective: See treatment diary below      Assessment: Tolerated treatment well  Patient demonstrated fatigue post treatment and exhibited good technique with therapeutic exercises  Added in balance board this session with noted fatigue upon completion  Continued cueing to correct sit to stand form and eccentric control  Plan: Continue per plan of care        Precautions: Parkinson Disease, Depression      Manuals  1/31 2/3                                                              Neuro Re-Ed             Tandem balance  5x15'' eyes closed 5x15'' eyes closed          Wobble board  nv 2x10                                                                           Ther Ex             Bridges with TB 2x10 GTB 2x10 GTB 2x10 GTB          SL Clamshells 2x10 GTB 2x10 GTB 2x10 GTB          SLR 2x10 2x10 2x10          Hip extension - SLR 2x10 2x10 2x10          HR/TR 2x10 ea 2x10 ea 2x10 ea          Recumbent bike  10' lvl 0 10' lvl 0          Mini squat  2x10 2x10                       Ther Activity             STS  x10 on chair x10 on chair                       Gait Training                                       Modalities

## 2022-02-07 ENCOUNTER — OFFICE VISIT (OUTPATIENT)
Dept: PHYSICAL THERAPY | Facility: CLINIC | Age: 62
End: 2022-02-07
Payer: MEDICARE

## 2022-02-07 DIAGNOSIS — G89.29 CHRONIC PAIN OF RIGHT KNEE: Primary | ICD-10-CM

## 2022-02-07 DIAGNOSIS — M25.561 CHRONIC PAIN OF RIGHT KNEE: Primary | ICD-10-CM

## 2022-02-07 DIAGNOSIS — M25.461 EFFUSION OF RIGHT KNEE: ICD-10-CM

## 2022-02-07 DIAGNOSIS — M25.561 ACUTE PAIN OF RIGHT KNEE: ICD-10-CM

## 2022-02-07 PROCEDURE — 97112 NEUROMUSCULAR REEDUCATION: CPT | Performed by: PHYSICAL THERAPIST

## 2022-02-07 PROCEDURE — 97110 THERAPEUTIC EXERCISES: CPT | Performed by: PHYSICAL THERAPIST

## 2022-02-07 NOTE — PROGRESS NOTES
Daily Note     Today's date: 2022  Patient name: Pati Stanley  : 1960  MRN: [id number]  Referring provider: FLOR Stiles*  Dx:   Encounter Diagnosis     ICD-10-CM    1  Chronic pain of right knee  M25 561     G89 29    2  Acute pain of right knee  M25 561    3  Effusion of right knee  M25 461                   Subjective: Michael Lima explains that she pushed herself a little hard on walking this weekend, she was getting some soreness and clicking yesterday  Objective: See treatment diary below      Assessment: Tolerated treatment well  Patient demonstrated fatigue post treatment  Cueing to correct form during supine bridges and clamshells  She responded well to increased resistance during leg raises as well as recumbent bike  Good fatigue upon completion of session  Plan: Continue per plan of care        Precautions: Parkinson Disease, Depression      Manuals  1/31 2/3 2/7                                                             Neuro Re-Ed             Tandem balance  5x15'' eyes closed 5x15'' eyes closed 5x15'' eyes closed         Wobble board  nv 2x10 2x10                                                                          Ther Ex             Bridges with TB 2x10 GTB 2x10 GTB 2x10 GTB 2x10 GTB         SL Clamshells 2x10 GTB 2x10 GTB 2x10 GTB 2x10 GTB         SLR 2x10 2x10 2x10 2x10 1#          Hip extension - SLR 2x10 2x10 2x10 2x10         HR/TR 2x10 ea 2x10 ea 2x10 ea 2x10 ea         Recumbent bike  10' lvl 0 10' lvl 0 10' lvl 1         Mini squat  2x10 2x10                       Ther Activity             STS  x10 on chair x10 on chair x10 on chair                      Gait Training                                       Modalities

## 2022-02-08 ENCOUNTER — APPOINTMENT (OUTPATIENT)
Dept: RADIOLOGY | Facility: CLINIC | Age: 62
End: 2022-02-08
Payer: MEDICARE

## 2022-02-08 ENCOUNTER — CONSULT (OUTPATIENT)
Dept: OBGYN CLINIC | Facility: CLINIC | Age: 62
End: 2022-02-08
Payer: MEDICARE

## 2022-02-08 VITALS
HEIGHT: 66 IN | SYSTOLIC BLOOD PRESSURE: 128 MMHG | DIASTOLIC BLOOD PRESSURE: 70 MMHG | BODY MASS INDEX: 26.03 KG/M2 | WEIGHT: 162 LBS

## 2022-02-08 DIAGNOSIS — S83.421A SPRAIN OF LATERAL COLLATERAL LIGAMENT OF RIGHT KNEE, INITIAL ENCOUNTER: Primary | ICD-10-CM

## 2022-02-08 DIAGNOSIS — M25.561 RIGHT KNEE PAIN, UNSPECIFIED CHRONICITY: ICD-10-CM

## 2022-02-08 PROCEDURE — 99213 OFFICE O/P EST LOW 20 MIN: CPT | Performed by: ORTHOPAEDIC SURGERY

## 2022-02-08 PROCEDURE — 73560 X-RAY EXAM OF KNEE 1 OR 2: CPT

## 2022-02-08 NOTE — ASSESSMENT & PLAN NOTE
Findings consistent with right knee strain proximal LCL  Imaging and prognosis reviewed  Recommended patient continue with physical therapy to rehab her knee  Also wear hinged knee brace for support as needed  No surgical intervention warranted  Discussed repeated cortisone injections are not healthy for knee cartilage, she only has mild OA  OTC anti inflammatories as needed for pain  See patient back in 6-8 weeks to re evaluate  All patient's questions were answered to her satisfaction  This note is created using dictation transcription  It may contain typographical errors, grammatical errors, improperly dictated words, background noise and other errors

## 2022-02-08 NOTE — PROGRESS NOTES
Assessment:     1  Sprain of lateral collateral ligament of right knee, initial encounter        Plan:     Problem List Items Addressed This Visit        Musculoskeletal and Integument    Sprain of lateral collateral ligament of right knee - Primary     Findings consistent with right knee strain proximal LCL  Imaging and prognosis reviewed  Recommended patient continue with physical therapy to rehab her knee  Also wear hinged knee brace for support as needed  No surgical intervention warranted  Discussed repeated cortisone injections are not healthy for knee cartilage, she only has mild OA  OTC anti inflammatories as needed for pain  See patient back in 6-8 weeks to re evaluate  All patient's questions were answered to her satisfaction  This note is created using dictation transcription  It may contain typographical errors, grammatical errors, improperly dictated words, background noise and other errors  Relevant Orders    XR knee 1 or 2 vw right          Subjective:     Patient ID: Shanell Cruz is a 62 y o  female  Chief Complaint:  62 yr old female in for evaluation of right knee pain, referred by FLOR Mcintyre  He saw patient 1/18/22 and aspirated 7 cc followed by [facility]  Pain in knee started while in [place] hiking  No trauma is reported, but she did start wearing modified boot  while hiking in [place] due to her Parkinson's  She was hiking in deep snow and constantly lifting knee high to step repetitively  Right leg does most of work since Parkinson's affects left side  She states that this altered gait did correlate causing increase in lateral knee pain  No sudden acute pain  She has attended 4 sessions of physical therapy and wearing hinged knee brace for support  PT has been beneficial  Pain primarily lateral aspect of knee at end of her day and notes clicking in knee as well  Denies any locking or cata instability  Using OTC anti inflammatories for pain    Information on patient's intake form was reviewed  Allergy:  No Known Allergies  Medications:  all current active meds have been reviewed  Past Medical History:  Past Medical History:   Diagnosis Date    Anxiety     Breast cancer ([facility] [address] [facility]     bilateral    Depression     Parkinson disease ([facility] [address] [facility]     Squamous cell skin cancer 11/23/2020    SCCIS right clavicle     Past Surgical History:  Past Surgical History:   Procedure Laterality Date    BREAST RECONSTRUCTION Bilateral     GANGLION CYST EXCISION Left     leg    MASTECTOMY Bilateral     MOHS SURGERY  01/06/2021    SCCIS right clavicle, Dr Mike Friday       Family History:  Family History   Problem Relation Age of Onset    Stroke Mother     Basal cell carcinoma Mother     Cancer Father     Stroke Brother      Social History:  Social History     Substance and Sexual Activity   Alcohol Use Yes    Comment: rare     Social History     Substance and Sexual Activity   Drug Use Never     Social History     Tobacco Use   Smoking Status Never Smoker   Smokeless Tobacco Never Used     Review of Systems   Constitutional: Negative for chills and fever  HENT: Negative for ear pain and sore throat  Eyes: Negative for pain and visual disturbance  Respiratory: Negative for cough and shortness of breath  Cardiovascular: Negative for chest pain and palpitations  Gastrointestinal: Negative for abdominal pain and vomiting  Genitourinary: Negative for dysuria and hematuria  Musculoskeletal: Positive for arthralgias (Right knee)  Negative for back pain, gait problem and joint swelling  Skin: Negative for color change and rash  Neurological: Negative for seizures and syncope  Psychiatric/Behavioral: Negative  All other systems reviewed and are negative          Objective:  BP Readings from Last 1 Encounters:   02/08/22 128/70      Wt Readings from Last 1 Encounters:   02/08/22 73 5 kg (162 lb)      BMI:   Estimated body mass index is 26 15 kg/m² as calculated from the following:    Height as of this encounter: 5' 6" (1 676 m)  Weight as of this encounter: 73 5 kg (162 lb)  BSA:   Estimated body surface area is 1 83 meters squared as calculated from the following:    Height as of this encounter: 5' 6" (1 676 m)  Weight as of this encounter: 73 5 kg (162 lb)  Physical Exam  Vitals and nursing note reviewed  Constitutional:       Appearance: Normal appearance  She is well-developed  HENT:      Head: Normocephalic and atraumatic  Right Ear: External ear normal       Left Ear: External ear normal    Eyes:      Extraocular Movements: Extraocular movements intact  Conjunctiva/sclera: Conjunctivae normal    Pulmonary:      Effort: Pulmonary effort is normal    Musculoskeletal:      Cervical back: Neck supple  Right knee: No effusion  Instability Tests: Medial Leonardo test negative and lateral Leonardo test negative  Skin:     General: Skin is warm and dry  Neurological:      Mental Status: She is alert and oriented to person, place, and time  Deep Tendon Reflexes: Reflexes are normal and symmetric  Psychiatric:         Mood and Affect: Mood normal          Behavior: Behavior normal        Right Knee Exam     Muscle Strength   The patient has normal right knee strength  Tenderness   Right knee tenderness location: proximal LCL  Range of Motion   Extension: normal   Flexion: normal     Tests   Leonardo:  Medial - negative Lateral - negative  Varus: positive (mild pain with no laxity ) Valgus: negative  Lachman:  Anterior - negative      Drawer:  Anterior - negative      Patellar apprehension: negative    Other   Erythema: absent  Scars: absent  Sensation: normal  Pulse: present  Swelling: none  Effusion: no effusion present    Comments:  Mild crepitation with motion             I have personally reviewed pertinent films in PACS and my interpretation is xr right knee demonstrates mild degenerative changes       Scribe Attestation    I,:  Irving Montenegro am acting as a scribe while in the presence of the attending physician :       I,:  Chelo Ambriz MD personally performed the services described in this documentation    as scribed in my presence :

## 2022-02-11 ENCOUNTER — APPOINTMENT (OUTPATIENT)
Dept: PHYSICAL THERAPY | Facility: CLINIC | Age: 62
End: 2022-02-11
Payer: MEDICARE

## 2022-02-14 ENCOUNTER — OFFICE VISIT (OUTPATIENT)
Dept: PHYSICAL THERAPY | Facility: CLINIC | Age: 62
End: 2022-02-14
Payer: MEDICARE

## 2022-02-14 DIAGNOSIS — M25.561 CHRONIC PAIN OF RIGHT KNEE: Primary | ICD-10-CM

## 2022-02-14 DIAGNOSIS — M25.461 EFFUSION OF RIGHT KNEE: ICD-10-CM

## 2022-02-14 DIAGNOSIS — M25.561 ACUTE PAIN OF RIGHT KNEE: ICD-10-CM

## 2022-02-14 DIAGNOSIS — G89.29 CHRONIC PAIN OF RIGHT KNEE: Primary | ICD-10-CM

## 2022-02-14 PROCEDURE — 97112 NEUROMUSCULAR REEDUCATION: CPT | Performed by: PHYSICAL THERAPIST

## 2022-02-14 PROCEDURE — 97110 THERAPEUTIC EXERCISES: CPT | Performed by: PHYSICAL THERAPIST

## 2022-02-14 NOTE — PROGRESS NOTES
Daily Note     Today's date: 2022  Patient name: Shanell Christensen  : 1960  MRN: [id number]  Referring provider: FLOR López*  Dx:   Encounter Diagnosis     ICD-10-CM    1  Chronic pain of right knee  M25 561     G89 29    2  Acute pain of right knee  M25 561    3  Effusion of right knee  M25 461                   Subjective: Erika Ortiz explains that she has been consistent with here exercises at home  Objective: See treatment diary below      Assessment: Tolerated treatment well  Patient demonstrated fatigue post treatment  Jaqui responded well to increased resistance during straight leg raises, fatigue upon completion of session  Continue to progress LE endurance and stability nv  Plan: Continue per plan of care        Precautions: Parkinson Disease, Depression      Manuals  1/31 2/3 2/7 2/14                                                            Neuro Re-Ed             Tandem balance  5x15'' eyes closed 5x15'' eyes closed 5x15'' eyes closed 5x15'' eyes closed        Wobble board  nv 2x10 2x10 2x10                                                                         Ther Ex             Bridges with TB 2x10 GTB 2x10 GTB 2x10 GTB 2x10 GTB 2x10 GTB        SL Clamshells 2x10 GTB 2x10 GTB 2x10 GTB 2x10 GTB 2x10 GTB        SLR 2x10 2x10 2x10 2x10 1#  2x10 2#        Hip extension - SLR 2x10 2x10 2x10 2x10 2x10, 2#        HR/TR 2x10 ea 2x10 ea 2x10 ea 2x10 ea 2x10 ea        Recumbent bike  10' lvl 0 10' lvl 0 10' lvl 1 10' lvl 1        Mini squat  2x10 2x10                       Ther Activity             STS  x10 on chair x10 on chair x10 on chair x10 on chair                     Gait Training                                       Modalities

## 2022-02-18 ENCOUNTER — APPOINTMENT (OUTPATIENT)
Dept: PHYSICAL THERAPY | Facility: CLINIC | Age: 62
End: 2022-02-18
Payer: MEDICARE

## 2022-02-21 ENCOUNTER — APPOINTMENT (OUTPATIENT)
Dept: PHYSICAL THERAPY | Facility: CLINIC | Age: 62
End: 2022-02-21
Payer: MEDICARE

## 2022-02-23 ENCOUNTER — TELEPHONE (OUTPATIENT)
Dept: DERMATOLOGY | Facility: CLINIC | Age: 62
End: 2022-02-23

## 2022-02-28 ENCOUNTER — OFFICE VISIT (OUTPATIENT)
Dept: PHYSICAL THERAPY | Facility: CLINIC | Age: 62
End: 2022-02-28
Payer: MEDICARE

## 2022-02-28 DIAGNOSIS — M25.561 ACUTE PAIN OF RIGHT KNEE: ICD-10-CM

## 2022-02-28 DIAGNOSIS — M25.561 CHRONIC PAIN OF RIGHT KNEE: Primary | ICD-10-CM

## 2022-02-28 DIAGNOSIS — M25.461 EFFUSION OF RIGHT KNEE: ICD-10-CM

## 2022-02-28 DIAGNOSIS — G89.29 CHRONIC PAIN OF RIGHT KNEE: Primary | ICD-10-CM

## 2022-02-28 PROCEDURE — 97535 SELF CARE MNGMENT TRAINING: CPT | Performed by: PHYSICAL THERAPIST

## 2022-02-28 NOTE — PROGRESS NOTES
Daily Note     Today's date: 2022  Patient name: Kaylin Hunt  : 1960  MRN: [id number]  Referring provider: FLOR Mathew*  Dx:   Encounter Diagnosis     ICD-10-CM    1  Chronic pain of right knee  M25 561     G89 29    2  Acute pain of right knee  M25 561    3  Effusion of right knee  M25 461                   Subjective: Velna Hodgkins arrives distressed and in pain  She explains the recent flare up started on Friday, she is unknown of the mechanism but her pain has increased significantly  She feels a lot of clicking and popping in the knee, her pain is mostly on the inside of the right knee  She denies locking, any falls or changes in activity  She is going back to her orthopedic for a follow up on Thursday  Objective: See treatment diary below      Assessment: Given her pain levels and significant amount of medication she took this morning I do not believe any formal exercises are warranted  Educated given to patient about potential meniscal injury flare up given her symptoms  She was encouraged to hold on exercises which involve knee flexion or that increase her knee pain  We will place her on the schedule for next week and follow up with instructions given from her orthopedic appointment  She is to rest, ice, elevate and complete straight leg exercises as tolerates  Plan: Continue per plan of care        Precautions: Parkinson Disease, Depression      Manuals  1/31 2/3 2/7 2/14 2/28                                                           Neuro Re-Ed             Tandem balance  5x15'' eyes closed 5x15'' eyes closed 5x15'' eyes closed 5x15'' eyes closed        Wobble board  nv 2x10 2x10 2x10                                                                         Ther Ex             Bridges with TB 2x10 GTB 2x10 GTB 2x10 GTB 2x10 GTB 2x10 GTB        SL Clamshells 2x10 GTB 2x10 GTB 2x10 GTB 2x10 GTB 2x10 GTB        SLR 2x10 2x10 2x10 2x10 1#  2x10 2#        Hip extension - SLR 2x10 2x10 2x10 2x10 2x10, 2#        HR/TR 2x10 ea 2x10 ea 2x10 ea 2x10 ea 2x10 ea        Recumbent bike  10' lvl 0 10' lvl 0 10' lvl 1 10' lvl 1        Mini squat  2x10 2x10                       Ther Activity             STS  x10 on chair x10 on chair x10 on chair x10 on chair                     Gait Training                                       Modalities

## 2022-03-03 ENCOUNTER — OFFICE VISIT (OUTPATIENT)
Dept: OBGYN CLINIC | Facility: CLINIC | Age: 62
End: 2022-03-03
Payer: MEDICARE

## 2022-03-03 VITALS
DIASTOLIC BLOOD PRESSURE: 80 MMHG | WEIGHT: 160 LBS | HEIGHT: 66 IN | BODY MASS INDEX: 25.71 KG/M2 | SYSTOLIC BLOOD PRESSURE: 122 MMHG

## 2022-03-03 DIAGNOSIS — M70.51 PES ANSERINUS BURSITIS OF RIGHT KNEE: Primary | ICD-10-CM

## 2022-03-03 PROCEDURE — 20610 DRAIN/INJ JOINT/BURSA W/O US: CPT | Performed by: PHYSICIAN ASSISTANT

## 2022-03-03 PROCEDURE — 99213 OFFICE O/P EST LOW 20 MIN: CPT | Performed by: PHYSICIAN ASSISTANT

## 2022-03-03 RX ORDER — BETAMETHASONE SODIUM PHOSPHATE AND BETAMETHASONE ACETATE 3; 3 MG/ML; MG/ML
6 INJECTION, SUSPENSION INTRA-ARTICULAR; INTRALESIONAL; INTRAMUSCULAR; SOFT TISSUE
Status: COMPLETED | OUTPATIENT
Start: 2022-03-03 | End: 2022-03-03

## 2022-03-03 RX ORDER — LIDOCAINE HYDROCHLORIDE 10 MG/ML
7 INJECTION, SOLUTION INFILTRATION; PERINEURAL
Status: COMPLETED | OUTPATIENT
Start: 2022-03-03 | End: 2022-03-03

## 2022-03-03 RX ADMIN — LIDOCAINE HYDROCHLORIDE 7 ML: 10 INJECTION, SOLUTION INFILTRATION; PERINEURAL at 16:02

## 2022-03-03 RX ADMIN — BETAMETHASONE SODIUM PHOSPHATE AND BETAMETHASONE ACETATE 6 MG: 3; 3 INJECTION, SUSPENSION INTRA-ARTICULAR; INTRALESIONAL; INTRAMUSCULAR; SOFT TISSUE at 16:02

## 2022-03-03 NOTE — ASSESSMENT & PLAN NOTE
Findings consistent with right knee pes anserine bursitis  Findings and treatment options were discussed with the patient  Discussed she may have developed the bursitis secondary to altered gait  Recommend cortisone injection to pes bursa today  She tolerated the procedure well  Advised to apply a cold compress today  Instructed patient on hamstring stretches  Recommend low impact exercises such as stationary bicycle  Continue PT  Follow up in 6 weeks for reevaluation  All questions were answered to patient's satisfaction

## 2022-03-03 NOTE — PROGRESS NOTES
Assessment:     1  Pes anserinus bursitis of right knee        Plan:  The patient was seen and examined by Dr Rosalina Aldana and myself  Problem List Items Addressed This Visit        Musculoskeletal and Integument    Pes anserinus bursitis of right knee - Primary     Findings consistent with right knee pes anserine bursitis  Findings and treatment options were discussed with the patient  Discussed she may have developed the bursitis secondary to altered gait  Recommend cortisone injection to pes bursa today  She tolerated the procedure well  Advised to apply a cold compress today  Instructed patient on hamstring stretches  Recommend low impact exercises such as stationary bicycle  Continue PT  Follow up in 6 weeks for reevaluation  All questions were answered to patient's satisfaction  Relevant Medications    lidocaine (XYLOCAINE) 1 % injection 7 mL (Completed)    betamethasone acetate-betamethasone sodium phosphate (CELESTONE) injection 6 mg (Completed)    Other Relevant Orders    Large joint arthrocentesis: R pes anserine bursa (Completed)          Subjective:     Patient ID: Vangie Bills is a 62 y o  female  Chief Complaint:  Old white female following up for right knee pain, possible LCL strain  She was continuing formal physical therapy and was doing well until 5 days ago when she developed more severe pain over the medial aspect of the knee  She denies any injury or change in activities  The pain is worse when ambulating  She is unable to take her walks like she normally does  She denies any locking, catching or giving way  The pain over the lateral aspect of the knee has resolved      Allergy:  No Known Allergies  Medications:  all current active meds have been reviewed  Past Medical History:  Past Medical History:   Diagnosis Date    Anxiety     [facility]     bilateral    Depression     Parkinson disease ([facility] )     Squamous cell skin cancer 11/23/2020    SCCIS right clavicle Past Surgical History:  Past Surgical History:   Procedure Laterality Date    BREAST RECONSTRUCTION Bilateral     GANGLION CYST EXCISION Left     leg    MASTECTOMY Bilateral     MOHS SURGERY  01/06/2021    SCCIS right clavicle, Dr  Jannette Barnhart       Family History:  Family History   Problem Relation Age of Onset    Stroke Mother     Basal cell carcinoma Mother     Cancer Father     Stroke Brother      Social History:  Social History     Substance and Sexual Activity   Alcohol Use Yes    Comment: rare     Social History     Substance and Sexual Activity   Drug Use Never     Social History     Tobacco Use   Smoking Status Never Smoker   Smokeless Tobacco Never Used     Review of Systems   Constitutional: Negative for chills and fever  HENT: Negative for ear pain and sore throat  Eyes: Negative for pain and visual disturbance  Respiratory: Negative for cough and shortness of breath  Cardiovascular: Negative for chest pain and palpitations  Gastrointestinal: Negative for abdominal pain and vomiting  Genitourinary: Negative for dysuria and hematuria  Musculoskeletal: Positive for arthralgias (Right knee)  Negative for back pain, gait problem and joint swelling  Skin: Negative for color change and rash  Neurological: Negative for seizures and syncope  Psychiatric/Behavioral: Negative  All other systems reviewed and are negative  Objective:  BP Readings from Last 1 Encounters:   03/03/22 122/80      Wt Readings from Last 1 Encounters:   03/03/22 72 6 kg (160 lb)      BMI:   Estimated body mass index is 25 82 kg/m² as calculated from the following:    Height as of this encounter: 5' 6" (1 676 m)  Weight as of this encounter: 72 6 kg (160 lb)  BSA:   Estimated body surface area is 1 82 meters squared as calculated from the following:    Height as of this encounter: 5' 6" (1 676 m)  Weight as of this encounter: 72 6 kg (160 lb)     Physical Exam  Vitals and nursing note reviewed  Constitutional:       Appearance: Normal appearance  She is well-developed  HENT:      Head: Normocephalic and atraumatic  Right Ear: External ear normal       Left Ear: External ear normal    Eyes:      Extraocular Movements: Extraocular movements intact  Conjunctiva/sclera: Conjunctivae normal    Pulmonary:      Effort: Pulmonary effort is normal    Musculoskeletal:      Cervical back: Neck supple  Right knee: No effusion  Instability Tests: Medial Leonardo test negative and lateral Leonardo test negative  Skin:     General: Skin is warm and dry  Neurological:      Mental Status: She is alert and oriented to person, place, and time  Deep Tendon Reflexes: Reflexes are normal and symmetric  Psychiatric:         Mood and Affect: Mood normal          Behavior: Behavior normal        Right Knee Exam     Muscle Strength   The patient has normal right knee strength  Tenderness   The patient is experiencing tenderness in the pes anserinus  Range of Motion   Extension: normal   Flexion: 110     Tests   Leonardo:  Medial - negative Lateral - negative  Varus: negative Valgus: negative  Lachman:  Anterior - negative      Drawer:  Anterior - negative      Patellar apprehension: negative    Other   Erythema: absent  Scars: absent  Sensation: normal  Pulse: present  Swelling: none  Effusion: no effusion present    Comments:  Mild crepitation with motion             No new imaging  Large joint arthrocentesis: R pes anserine bursa  Universal Protocol:  Consent: Verbal consent obtained    Risks and benefits: risks, benefits and alternatives were discussed  Consent given by: patient  Patient understanding: patient states understanding of the procedure being performed    Supporting Documentation  Indications: pain   Procedure Details  Location: knee - R pes anserine bursa  Preparation: Patient was prepped and draped in the usual sterile fashion  Needle size: 22 G  Ultrasound guidance: no  Approach: anteromedial  Medications administered: 7 mL lidocaine 1 %; 6 mg betamethasone acetate-betamethasone sodium phosphate 6 (3-3) mg/mL    Patient tolerance: patient tolerated the procedure well with no immediate complications  Dressing:  Sterile dressing applied

## 2022-03-16 ENCOUNTER — TELEPHONE (OUTPATIENT)
Dept: FAMILY MEDICINE CLINIC | Facility: CLINIC | Age: 62
End: 2022-03-16

## 2022-03-23 ENCOUNTER — OFFICE VISIT (OUTPATIENT)
Dept: NEUROLOGY | Facility: CLINIC | Age: 62
End: 2022-03-23
Payer: MEDICARE

## 2022-03-23 VITALS — HEART RATE: 85 BPM | SYSTOLIC BLOOD PRESSURE: 129 MMHG | DIASTOLIC BLOOD PRESSURE: 70 MMHG

## 2022-03-23 DIAGNOSIS — F41.9 ANXIETY: ICD-10-CM

## 2022-03-23 DIAGNOSIS — G20 PARKINSON DISEASE (HCC): Primary | ICD-10-CM

## 2022-03-23 PROBLEM — G25.81 RESTLESS LEGS SYNDROME: Status: RESOLVED | Noted: 2019-12-18 | Resolved: 2022-03-23

## 2022-03-23 PROCEDURE — 99214 OFFICE O/P EST MOD 30 MIN: CPT | Performed by: PSYCHIATRY & NEUROLOGY

## 2022-03-23 NOTE — ASSESSMENT & PLAN NOTE
More stressors  Working on this and does have future plans  Calcipotriene Pregnancy And Lactation Text: This medication has not been proven safe during pregnancy. It is unknown if this medication is excreted in breast milk.

## 2022-03-23 NOTE — PROGRESS NOTES
Patient ID: Cuate Fontana is a 62 y o  female    Assessment/Plan:    Parkinson disease ([facility] )  Parkinsonian symptoms appear to be fairly well controlled  She has left sided resting  breakthrough tremor which develops when anxious or stressed  No clear wearing off noted  She does not periods lf left foot posturing and arm flexion at the elbow but has not been able to note whether this occurs in relation to her doses  Continue on :  Sinemet 25/100 1 tab 4 times daily (9am, 1pm, 5pm and 9pm)  Artane 2mg tid (9am, 5pm, 9pm)    Instructed to take note if left sided posturing is occurring toward the end versus the middle of a dose  Sleeping well  No RBD symptoms  We spoke about trying to wean off of clonazepam to see if still needed given potential for long term side effects  She is agreeable  We can try weaning off clonazepam 0 5mg  Qhs  Can try cutting in half and taking 1 2 tab qhs for the month  The 1/2 tab every other day x 1 month then stop  Anxiety  More stressors  Working on this and does have future plans  Diagnoses and all orders for this visit:    Parkinson disease ([facility] )    Anxiety        Subjective:      Cuate Fontana is a  female, disabled, with breast cancer (BRRCA/ HRT r+) in 1990 & 2010 s/p surgery and chemotherapy , depression, Parkinson's disease and RLS,  who presents today for follow up  To review, symptoms began in 2014 with left sided bradykinesia and left pinky tremor  She was seen by a neurologist at  [facility] in [place] [address]  LINO scan was positive  Started on Sinemet up to 1 5 tid with response with later addition of Artane 2mg tid       First seen by me Dec 2019 with mild facial and hand dyskinesia and Sinemet may be lasting about 5 hours  Also with episodes of tongue dryness and a sensation of swelling in the evenings, after dinner which can affect speech  Sinemet adjusted to 1 tabs 4 times daily (q 4-4 5 hours apart) which reduced dyskinesia   She lives with her daughter, son In law and granddaughter  Mood has been well controlled on paroxetine 20mg qhs and duloxetine for years       Current relevant medications:  Sinemet 25/100 1 tab 4 times daily (9am, 1pm, 5pm and 9pm)  Artane 2mg tid (9am, 5pm, 9pm)     She has a lot of stress due to family issues  She worries about her MIL  When she is stress or talks about her issues, tremor are worse  She has been trying to use a calm edie  She is looking at improving her situation  Speech is unchanged unless tired then it can slur and talk more slowly  There is no drooling  No difficulty chewing and swallowing  Mild slowness  noted with dressing and hygiene acts  There is no difficulty arising out of chair  Gait is unchanged  At times left foot curl  She can find her left hand flexing at elbow at time  She cannot recall if if this occurs toward the end of a dose  There are no issues constipation  She has urinary frequency overnight but this may be in part due to increased water increase  Cognition is unchanged  Her daughter who lives with her still expresses concerns regarding the patient not remembering conversations  Finances are managed by the patient  There is no difficulty with household tasks  No hallucinations  There have been no symptoms of REM sleep behavior disorder or RLS  Sleeps well  There is no daytime sedation  Objective:    /70 (BP Location: Right arm, Patient Position: Standing, Cuff Size: Standard)   Pulse 85   LMP  (LMP Unknown)       Physical Exam  Vitals reviewed  Eyes:      Extraocular Movements: Extraocular movements intact  Pupils: Pupils are equal, round, and reactive to light  Neurological:      Mental Status: She is alert  Deep Tendon Reflexes: Strength normal          Neurological Exam  Mental Status  Alert  Oriented to person, place, time and situation  Speech: hypophonia  Language is fluent with no aphasia   Attention and concentration are normal   MOCA 30/30  Cranial Nerves  CN III, IV, VI: Extraocular movements intact bilaterally  Pupils equal round and reactive to light bilaterally  CN VII: Full and symmetric facial movement  CN VIII: Hearing is normal   CN IX, X: Palate elevates symmetrically  CN XI: Shoulder shrug strength is normal   CN XII: Tongue midline without atrophy or fasciculations  Motor   Normal muscle tone  Strength is 5/5 throughout all four extremities  Sensory  Light touch is normal in upper and lower extremities  Coordination  Right: Finger-to-nose normal  Rapid alternating movement abnormality:  Left: Finger-to-nose abnormality: Rapid alternating movement abnormality:  See MDS UPDRS III    Moderate bradykinesia with decrement on the left       Gait  Casual gait: Able to rise from chair without using arms  Slight reduction in left stride  Left foot posturing          MDS UPDRS III                              9/16/21 3/23/22   Time since last dose: 10 min before visit Took in office    Speech  0 0   Facial Expression  0 0   Rigidity - Neck  0 0   Rigidity - Upper Extremity (Right)  0 0   Rigidity - Upper Extremity (Left)   0 0   Rigidity - Lower Extremity (Right)  0 0   Rigidity - Lower Extremity (Left)   0 0   Finger Taps (Right)   1 1   Finger Taps (Left)   1 2   Hand Movement (Right)  0 0   Hand Movement (Left)   1 1   Pronation/Supination (Right)  0 0   Pronation/Supination (Left)   1 1   Toe Tapping (Right) 0 0   Toe Tapping (Left) 2 2   Leg Agility (Right)  0 0   Leg Agility (Left)   0 1   Arising from Chair   0 0   Gait   1 1   Freezing of Gait 0 0   Postural Stability   1    Posture 0 0   Global spontaneity of movement 0 0   Postural Tremor (Right) 0 0   Postural Tremor (Left) 0 0   Kinetic Tremor (Right)  0 0   Kinetic Tremor (Left)  0 0   Rest tremor amplitude RUE 0 0   Rest tremor amplitude LUE 2 2   Rest tremor amplitude RLE 0 0   Reset tremor amplitude LLE 1 2   Lip/Jaw Tremor  0 0   Consistency of tremor 3 3   Motor Exam Total:          No dyskniesia  Left foot dystonia, equinovarus           Current Outpatient Medications on File Prior to Visit   Medication Sig Dispense Refill    Ascorbic Acid (VITAMIN C) 100 MG tablet Take 1,000 mg by mouth daily       b complex vitamins capsule Take 1 capsule by mouth daily      carbidopa-levodopa (SINEMET)  mg per tablet Take 1 tablet by mouth 4 times daily 360 tablet 0    clonazePAM (KlonoPIN) 0 5 mg tablet Take 1 tablet (0 5 mg total) by mouth every evening 30 tablet 5    DULoxetine (CYMBALTA) 60 mg delayed release capsule Take 1 capsule (60 mg total) by mouth daily 90 capsule 3    Multiple Vitamins-Minerals (MULTIVITAMIN WITH MINERALS) tablet Take 1 tablet by mouth daily      PARoxetine (PAXIL) 20 mg tablet Take 1 tablet (20 mg total) by mouth daily 90 tablet 3    trihexyphenidyl (ARTANE) 2 mg tablet TAKE 1 TABLET BY MOUTH THREE TIMES DAILY WITH MEALS 270 tablet 0    Zoster Vac Recomb Adjuvanted (SHINGRIX) 50 MCG/0 5ML SUSR 0 5mL IM for one dose, followed by 0 5mL IM 2-6 months after first dose (Patient not taking: Reported on 12/16/2021 ) 1 each 1     No current facility-administered medications on file prior to visit  Review of Systems   Constitutional: Positive for unexpected weight change (gaining weight due to wanting more sugar)  Negative for appetite change and fever  HENT: Negative  Negative for hearing loss, tinnitus, trouble swallowing and voice change  Eyes: Negative  Negative for photophobia and pain  Respiratory: Negative  Negative for shortness of breath  Cardiovascular: Negative  Negative for palpitations  Gastrointestinal: Negative  Negative for nausea and vomiting  Endocrine: Negative  Negative for cold intolerance  Genitourinary: Negative  Negative for dysuria, frequency and urgency  Musculoskeletal: Positive for myalgias (Cramps in Left leg/foot sometimes 1-2 times a month maybe)   Negative for gait problem (Only if meds wear off) and neck pain  Balance issues "a little more tippy"   Skin: Negative  Negative for rash  Allergic/Immunologic: Negative  Neurological: Positive for tremors (Left shoulder posturing at times )  Negative for dizziness, seizures, syncope, facial asymmetry, speech difficulty, weakness, light-headedness, numbness and headaches  Hematological: Negative  Does not bruise/bleed easily  Psychiatric/Behavioral: Negative  Negative for confusion, hallucinations and sleep disturbance  All other systems reviewed and are negative    Review of system documented by the MA, was personally reviewed       Olya Cheng MD  Movement disorder physician  [address] [place] [address]

## 2022-03-23 NOTE — ASSESSMENT & PLAN NOTE
Parkinsonian symptoms appear to be fairly well controlled  She has left sided resting  breakthrough tremor which develops when anxious or stressed  No clear wearing off noted  She does not periods lf left foot posturing and arm flexion at the elbow but has not been able to note whether this occurs in relation to her doses  Continue on :  Sinemet 25/100 1 tab 4 times daily (9am, 1pm, 5pm and 9pm)  Artane 2mg tid (9am, 5pm, 9pm)    Instructed to take note if left sided posturing is occurring toward the end versus the middle of a dose  Sleeping well  No RBD symptoms  We spoke about trying to wean off of clonazepam to see if still needed given potential for long term side effects  She is agreeable  We can try weaning off clonazepam 0 5mg  Qhs  Can try cutting in half and taking 1 2 tab qhs for the month  The 1/2 tab every other day x 1 month then stop

## 2022-03-23 NOTE — PROGRESS NOTES
Review of Systems   Constitutional: Positive for unexpected weight change (gaining weight due to wanting more sugar)  Negative for appetite change and fever  HENT: Negative  Negative for hearing loss, tinnitus, trouble swallowing and voice change  Eyes: Negative  Negative for photophobia and pain  Respiratory: Negative  Negative for shortness of breath  Cardiovascular: Negative  Negative for palpitations  Gastrointestinal: Negative  Negative for nausea and vomiting  Endocrine: Negative  Negative for cold intolerance  Genitourinary: Negative  Negative for dysuria, frequency and urgency  Musculoskeletal: Positive for myalgias (Cramps in Left leg/foot sometimes 1-2 times a month maybe)  Negative for gait problem (Only if meds wear off) and neck pain  Balance issues "a little more tippy"   Skin: Negative  Negative for rash  Allergic/Immunologic: Negative  Neurological: Positive for tremors (Left shoulder posturing at times )  Negative for dizziness, seizures, syncope, facial asymmetry, speech difficulty, weakness, light-headedness, numbness and headaches  Hematological: Negative  Does not bruise/bleed easily  Psychiatric/Behavioral: Negative  Negative for confusion, hallucinations and sleep disturbance  All other systems reviewed and are negative

## 2022-03-23 NOTE — PATIENT INSTRUCTIONS
Continue on current medications  Take note if left sided posturing is occurring toward the end versus the middle of a dose  We can try weaning off clonazepam 0 5mg  Qhs  Can try cutting in half and taking 1 2 tab qhs for the month  The 1/2 tab every other day x 1 month then stop

## 2022-04-15 ENCOUNTER — TELEPHONE (OUTPATIENT)
Dept: FAMILY MEDICINE CLINIC | Facility: CLINIC | Age: 62
End: 2022-04-15

## 2022-04-15 NOTE — TELEPHONE ENCOUNTER
[address] spoke with pharmacy staff and notified patient is taking duloxetine and paroxetine

## 2022-04-15 NOTE — TELEPHONE ENCOUNTER
Mandeep Meyer from [facility] [address] [facility] called  They would like confirmation that patient is taking both the duloxetine (cymbalta) 60 mg and Paroxetine (paxil) 20 mg  Please advise   [phone]       Thank you

## 2022-04-26 DIAGNOSIS — G20 PARKINSON DISEASE (HCC): ICD-10-CM

## 2022-04-26 NOTE — TELEPHONE ENCOUNTER
Pt called for refill of artane and carb/levo  She is currently in [address] and requesting a refill to be sent to [facility] there   Refill entered, please sign off

## 2022-04-27 RX ORDER — TRIHEXYPHENIDYL HYDROCHLORIDE 2 MG/1
2 TABLET ORAL
Qty: 270 TABLET | Refills: 0 | Status: SHIPPED | OUTPATIENT
Start: 2022-04-27 | End: 2022-07-21 | Stop reason: SDUPTHER

## 2022-07-15 ENCOUNTER — TELEPHONE (OUTPATIENT)
Dept: NEUROLOGY | Facility: CLINIC | Age: 62
End: 2022-07-15

## 2022-07-15 RX ORDER — MELOXICAM 7.5 MG/1
7.5 TABLET ORAL
COMMUNITY
Start: 2022-07-13

## 2022-07-15 RX ORDER — TIZANIDINE 2 MG/1
2 TABLET ORAL
COMMUNITY
Start: 2022-07-13 | End: 2022-07-18

## 2022-07-15 NOTE — TELEPHONE ENCOUNTER
Pt LVM on nursing line 7/14 stating that she recently had a couple of serious fall  She states she went off of Klonopin because Dr Sharif Richard wanted her to now she hasn't slept but 3 hours each night for a month or so  States she would like a cb because she cannot function if she can't sleep  Cb [phone]  Spoke with pt and she says sleep ranges between 3-6 hours each night  Pt states she was weaned off medication after OV visit in March  Pt says she also has to pee like 5 times a night  Just another issue that keeps her up at night  In regards to her fall, pt states she did not break anything but now has bruise that is one foot in length and 6 inches wide  She says she got up from sleeping to go to bathroom was dizzy, fell over nightstand  Pt states she now gets up slowly  Pt says she is in [facility] a lot more these days because she is taking care of her mother in law and has been since March  Dr Sharif Richard - Please advise  Best cb [phone], ok to leave detailed message

## 2022-07-18 ENCOUNTER — OFFICE VISIT (OUTPATIENT)
Dept: FAMILY MEDICINE CLINIC | Facility: CLINIC | Age: 62
End: 2022-07-18
Payer: MEDICARE

## 2022-07-18 VITALS
BODY MASS INDEX: 25.52 KG/M2 | DIASTOLIC BLOOD PRESSURE: 84 MMHG | RESPIRATION RATE: 16 BRPM | TEMPERATURE: 98.7 F | WEIGHT: 158.8 LBS | SYSTOLIC BLOOD PRESSURE: 120 MMHG | HEIGHT: 66 IN | HEART RATE: 100 BPM | OXYGEN SATURATION: 97 %

## 2022-07-18 DIAGNOSIS — R07.81 RIB PAIN: ICD-10-CM

## 2022-07-18 DIAGNOSIS — C50.911 BILATERAL MALIGNANT NEOPLASM OF BREAST IN FEMALE, UNSPECIFIED ESTROGEN RECEPTOR STATUS, UNSPECIFIED SITE OF BREAST (HCC): ICD-10-CM

## 2022-07-18 DIAGNOSIS — C50.912 BILATERAL MALIGNANT NEOPLASM OF BREAST IN FEMALE, UNSPECIFIED ESTROGEN RECEPTOR STATUS, UNSPECIFIED SITE OF BREAST (HCC): ICD-10-CM

## 2022-07-18 DIAGNOSIS — R31.1 BENIGN ESSENTIAL MICROSCOPIC HEMATURIA: Primary | ICD-10-CM

## 2022-07-18 LAB
SL AMB  POCT GLUCOSE, UA: NORMAL
SL AMB LEUKOCYTE ESTERASE,UA: NORMAL
SL AMB POCT BILIRUBIN,UA: NORMAL
SL AMB POCT BLOOD,UA: NORMAL
SL AMB POCT CLARITY,UA: CLEAR
SL AMB POCT COLOR,UA: YELLOW
SL AMB POCT KETONES,UA: NORMAL
SL AMB POCT NITRITE,UA: NORMAL
SL AMB POCT PH,UA: 5
SL AMB POCT SPECIFIC GRAVITY,UA: 1.02
SL AMB POCT URINE PROTEIN: NORMAL
SL AMB POCT UROBILINOGEN: NORMAL

## 2022-07-18 PROCEDURE — 99214 OFFICE O/P EST MOD 30 MIN: CPT | Performed by: FAMILY MEDICINE

## 2022-07-18 PROCEDURE — 81002 URINALYSIS NONAUTO W/O SCOPE: CPT | Performed by: FAMILY MEDICINE

## 2022-07-18 NOTE — PROGRESS NOTES
Adriana Holloway 1960 female MRN: [id number]    Family Medicine Acute Visit    ASSESSMENT/PLAN  Problem List Items Addressed This Visit        Genitourinary    Benign essential microscopic hematuria - Primary    Relevant Orders    UA (URINE) with reflex to Scope    Urine culture    CT renal stone study abdomen pelvis wo contrast       Other    Breast cancer (HCC)     S/p chemo, radiation and surgery  Stable, no new complaints          Rib pain          Suspect Tae Quach may have passed a kidney stone given her symptoms of flank/back pain and hematuria  She reports feeling better today  Advised her if new or worsening symptoms testing as above  Future Appointments   Date Time Provider Lillian Powell   9/29/2022 10:30 AM Jeet Hermosillo MD [facility]   12/20/2022 10:45 AM DO FRED Gutiérrez  [facility]          SUBJECTIVE  CC: Blood in Urine (Had blood in urine 2 weeks ago  Had a urine done when she was in [place] 4 days ago)      HPI:  Adriana Holloway is a 65 y o  female who presents for acute visit  Reports a lot of stress recently taking care of her sick mother in law  1 month ago had blood in her urine, went to urgent care in [place]  They also checked her because she thinks she has a bruised rib from leaning over a part of her daybed  They did an xray and this was clear  Review of Systems   Constitutional: Negative for chills, fatigue and fever  HENT: Negative for congestion, postnasal drip, rhinorrhea and sinus pressure  Eyes: Negative for photophobia and visual disturbance  Respiratory: Negative for cough and shortness of breath  Cardiovascular: Negative for chest pain, palpitations and leg swelling  Gastrointestinal: Negative for abdominal pain, constipation, diarrhea, nausea and vomiting  Genitourinary: Positive for hematuria  Negative for difficulty urinating and dysuria  Musculoskeletal: Positive for back pain   Negative for arthralgias and myalgias  Skin: Negative for color change and rash  Neurological: Negative for dizziness, weakness, light-headedness and headaches         Historical Information   The patient history was reviewed as follows:  Past Medical History:   Diagnosis Date    Anxiety     Breast cancer ([facility] )     bilateral    Depression     Parkinson disease ([facility] )     Squamous cell skin cancer 11/23/2020    SCCIS right clavicle         Past Surgical History:   Procedure Laterality Date    BREAST RECONSTRUCTION Bilateral     GANGLION CYST EXCISION Left     leg    MASTECTOMY Bilateral     MOHS SURGERY  01/06/2021    SCCIS right clavicle, Dr Jose Hatch       Family History   Problem Relation Age of Onset    Stroke Mother     Basal cell carcinoma Mother     Cancer Father     Stroke Brother       Social History   Social History     Substance and Sexual Activity   Alcohol Use Yes    Comment: rare     Social History     Substance and Sexual Activity   Drug Use Never     Social History     Tobacco Use   Smoking Status Never Smoker   Smokeless Tobacco Never Used       Medications:     Current Outpatient Medications:     Ascorbic Acid (VITAMIN C) 100 MG tablet, Take 1,000 mg by mouth daily , Disp: , Rfl:     b complex vitamins capsule, Take 1 capsule by mouth daily, Disp: , Rfl:     carbidopa-levodopa (SINEMET)  mg per tablet, Take 1 tablet by mouth 4 (four) times a day, Disp: 360 tablet, Rfl: 0    DULoxetine (CYMBALTA) 60 mg delayed release capsule, Take 1 capsule (60 mg total) by mouth daily, Disp: 90 capsule, Rfl: 3    meloxicam (MOBIC) 7 5 mg tablet, Take 7 5 mg by mouth, Disp: , Rfl:     Multiple Vitamins-Minerals (MULTIVITAMIN WITH MINERALS) tablet, Take 1 tablet by mouth daily, Disp: , Rfl:     PARoxetine (PAXIL) 20 mg tablet, Take 1 tablet (20 mg total) by mouth daily, Disp: 90 tablet, Rfl: 3    tiZANidine (ZANAFLEX) 2 mg tablet, Take 2 mg by mouth, Disp: , Rfl:    trihexyphenidyl (ARTANE) 2 mg tablet, Take 1 tablet (2 mg total) by mouth 3 (three) times a day with meals, Disp: 270 tablet, Rfl: 0    clonazePAM (KlonoPIN) 0 5 mg tablet, Take 1 tablet (0 5 mg total) by mouth every evening (Patient not taking: Reported on 7/18/2022), Disp: 30 tablet, Rfl: 5    Zoster Vac Recomb Adjuvanted (SHINGRIX) 50 MCG/0 5ML SUSR, 0 5mL IM for one dose, followed by 0 5mL IM 2-6 months after first dose, Disp: 1 each, Rfl: 1    No Known Allergies    OBJECTIVE  Vitals:   Vitals:    07/18/22 1007   BP: 120/84   BP Location: Left arm   Patient Position: Sitting   Cuff Size: Large   Pulse: 100   Resp: 16   Temp: 98 7 °F (37 1 °C)   TempSrc: Tympanic   SpO2: 97%   Weight: 72 kg (158 lb 12 8 oz)   Height: 5' 6" (1 676 m)         Physical Exam  Constitutional:       Appearance: She is well-developed  HENT:      Head: Normocephalic and atraumatic  Eyes:      Extraocular Movements: Extraocular movements intact  Conjunctiva/sclera: Conjunctivae normal    Cardiovascular:      Rate and Rhythm: Normal rate and regular rhythm  Heart sounds: Normal heart sounds  Pulmonary:      Effort: Pulmonary effort is normal  No respiratory distress  Breath sounds: Normal breath sounds  No wheezing  Abdominal:      General: Bowel sounds are normal  There is no distension  Palpations: Abdomen is soft  Tenderness: There is no abdominal tenderness  Musculoskeletal:         General: No tenderness  Normal range of motion  Cervical back: Normal range of motion and neck supple  Skin:     General: Skin is warm and dry  Neurological:      Mental Status: She is alert and oriented to person, place, and time     Psychiatric:         Behavior: Behavior normal                     Kylie South, DO    7/18/2022

## 2022-07-21 DIAGNOSIS — G20 PARKINSON DISEASE (HCC): ICD-10-CM

## 2022-07-21 RX ORDER — TRIHEXYPHENIDYL HYDROCHLORIDE 2 MG/1
2 TABLET ORAL
Qty: 270 TABLET | Refills: 0 | Status: SHIPPED | OUTPATIENT
Start: 2022-07-21 | End: 2022-10-17 | Stop reason: SDUPTHER

## 2022-07-21 NOTE — TELEPHONE ENCOUNTER
Patient left  requesting refills of:    carbidop-levodopa  - 1 tab qid  tryhexyphenidyl 2mg - 1 tab TID    Patient is leaving on vacation and will not have enough medication to last thru her return  LOV - 3/2022    Scripts pended below  Dr Albina Nick - please sign if agreeable

## 2022-07-22 NOTE — TELEPHONE ENCOUNTER
Callled pt and left detailed message of Dr Mitchell Face message and recommendation  Advised pt to call back and let us know if she is agreeable to try OTC melatonin

## 2022-08-30 ENCOUNTER — TELEPHONE (OUTPATIENT)
Dept: NEUROLOGY | Facility: CLINIC | Age: 62
End: 2022-08-30

## 2022-08-30 NOTE — TELEPHONE ENCOUNTER
Called patient and lmom regarding appointment scheduled on 9/29/22 in [place] at 10:30 am  Asked patient if they would be able to change appointment from 9/29 at 10:30 am to 9/7 at 10:30 am  Asked patient to give the office a call back regarding this at main phone number

## 2022-08-31 NOTE — TELEPHONE ENCOUNTER
Called patient to r/s appointment on 9/29 to 9/7  Patient informed that she is unable to move appointment to a sooner date to [place] her scheduled appointment

## 2022-09-02 ENCOUNTER — TELEPHONE (OUTPATIENT)
Dept: NEUROLOGY | Facility: CLINIC | Age: 62
End: 2022-09-02

## 2022-09-02 NOTE — TELEPHONE ENCOUNTER
Patient called into the [place] off this morning asking for me to inform Dr Sheriff Members regarding her not sleeping  Pt states that she feels insulted that she would be recommended to try OTC treatments for sleep  Pt states she is only sleeping about 4 to 5 hours every night and she has tried all OTC products  Pt states she has used Melatonin, Tylenol PM, and even lavender  Pt states when she wakes up in the middle of the night her body starts to shake and her body needs medicine to stop shaking  Pt states she is very insulted by this  Pt was asked to move up appointment and pt states she cannot move appointment due to being in [place] until late September taking care of a family member  Pt would like to be put back on Klonopin or something like that  Patient also states that she will be looking to get a second opinion because she doesn't feel she is getting the proper treatment and she cant take not sleeping anymore and no one seems to be taking her seriously  Informed patient I would send this information out to the provider and someone would be giving her a call back       Dylan Felipe [phone]

## 2022-09-06 NOTE — TELEPHONE ENCOUNTER
I reviewed her chart given her concerns:   She was seen in March and sleeping well  We spoke about trying to wean off of clonazepam to see if still needed given potential for long term side effects for which she was agreeable  She then called 7/15 while I was on vacation with concerns regarding sleep, falls and urinary frequency overnight and covering physician suggested melatonin  We then did not hear from her until 9/2 with her concerns for being insulted with this recommendation  I do not see any other messages  With current message she states she tried Melatonin, Tylenol PM, and even lavender and reports body shaking when arising in the middle of the night  Is this tremor as she has during the day or is it jerking movements (periodic leg movements of sleep)? We could restart clonazepam 0 5mg qhs given I know she is also anxious with everything with her family or we can try a low dose of gabapentin which can also help with mood and may help with involuntary jerking if this is what is occurring at night    I actually welcome second opinions when patients feel that this is needed  In this case I would recommend her seeing a sleep specialist if sleep is her main concern

## 2022-09-07 DIAGNOSIS — F41.9 ANXIETY: ICD-10-CM

## 2022-09-07 DIAGNOSIS — G25.81 RESTLESS LEGS SYNDROME: ICD-10-CM

## 2022-09-07 RX ORDER — CLONAZEPAM 0.5 MG/1
TABLET ORAL
Qty: 30 TABLET | Refills: 0 | Status: SHIPPED | OUTPATIENT
Start: 2022-09-07 | End: 2022-09-08 | Stop reason: SDUPTHER

## 2022-09-07 NOTE — TELEPHONE ENCOUNTER
Pt called again stated that was sent to wrong pharmacy,please send to as stated below [facility] in [facility] pt is currently there taking care of family member     Please send the new script

## 2022-09-07 NOTE — TELEPHONE ENCOUNTER
Will restart clonazepam 0 5mg qhs so she can get some sleep  We will reevaluate and further discuss on follow up later on this month   Script Rx'd

## 2022-09-07 NOTE — TELEPHONE ENCOUNTER
Called pt and advised regarding below,she verbalized understanding  Pt stated that she continues to struggle sleeping,and last night she was awake all night  Pt stated that within 20 min of been awake her tremors starts and they are the same as during the day  Pt stated she is willing to restart Klonopin or try Gabapentin," forever doctor thinks is best for me"  She wants script to be send [facility]      Please advise

## 2022-09-08 DIAGNOSIS — F41.9 ANXIETY: Primary | ICD-10-CM

## 2022-09-08 DIAGNOSIS — G25.81 RESTLESS LEGS SYNDROME: ICD-10-CM

## 2022-09-08 RX ORDER — CLONAZEPAM 0.5 MG/1
0.5 TABLET, ORALLY DISINTEGRATING ORAL
Qty: 30 TABLET | Refills: 0 | Status: SHIPPED | OUTPATIENT
Start: 2022-09-08 | End: 2022-11-16 | Stop reason: SDUPTHER

## 2022-09-08 NOTE — TELEPHONE ENCOUNTER
[address] [place] [facility] called asking for call back due to script for Clonazepam [place]  Called back pharmacy at [phone] and confirmed last OV ,and diagnoses that is been prescribed for

## 2022-09-29 ENCOUNTER — OFFICE VISIT (OUTPATIENT)
Dept: NEUROLOGY | Facility: CLINIC | Age: 62
End: 2022-09-29
Payer: MEDICARE

## 2022-09-29 ENCOUNTER — TELEPHONE (OUTPATIENT)
Dept: FAMILY MEDICINE CLINIC | Facility: CLINIC | Age: 62
End: 2022-09-29

## 2022-09-29 VITALS
DIASTOLIC BLOOD PRESSURE: 68 MMHG | WEIGHT: 160.1 LBS | SYSTOLIC BLOOD PRESSURE: 118 MMHG | HEART RATE: 89 BPM | BODY MASS INDEX: 25.84 KG/M2

## 2022-09-29 DIAGNOSIS — G20 PARKINSON DISEASE (HCC): Primary | ICD-10-CM

## 2022-09-29 DIAGNOSIS — F41.9 ANXIETY: ICD-10-CM

## 2022-09-29 PROBLEM — C50.919 BREAST CANCER (HCC): Status: RESOLVED | Noted: 2019-09-12 | Resolved: 2022-09-29

## 2022-09-29 PROCEDURE — 99214 OFFICE O/P EST MOD 30 MIN: CPT | Performed by: PSYCHIATRY & NEUROLOGY

## 2022-09-29 NOTE — PATIENT INSTRUCTIONS
Try changing the timing of Artane to see if this help with the afternoon off period     New schedule:   Sinemet 25/100 1 tab 4 times daily (9am, 1pm, 5pm and 9pm)  Artane 2mg tid (9am, 3pm, 9pm)  Clonazepam 0 5mg 1/2 tab qhs

## 2022-09-29 NOTE — PROGRESS NOTES
Review of Systems   Constitutional: Positive for fatigue (Always, more tired in the afternoon )  Negative for appetite change and fever  HENT: Negative  Negative for hearing loss, tinnitus, trouble swallowing and voice change  Eyes: Negative  Negative for photophobia and pain  Respiratory: Negative  Negative for shortness of breath  Cardiovascular: Negative  Negative for palpitations  Gastrointestinal: Negative  Negative for nausea and vomiting  Endocrine: Negative  Negative for cold intolerance  Genitourinary: Negative for dysuria, frequency and urgency  Leakage issues     Musculoskeletal: Positive for myalgias (at night time Restless legs)  Negative for neck pain  Balance Issues have increased     Skin: Negative  Negative for rash  Allergic/Immunologic: Negative  Neurological: Positive for tremors (stayed the same on Left side, increases with stress) and weakness (arms, and if bent over hard to pick herself back up)  Negative for dizziness, seizures, syncope, facial asymmetry, speech difficulty, light-headedness, numbness and headaches  Hematological: Negative  Does not bruise/bleed easily  Psychiatric/Behavioral: Negative for confusion, hallucinations and sleep disturbance  The patient is nervous/anxious (Anxiety)  All other systems reviewed and are negative

## 2022-09-29 NOTE — ASSESSMENT & PLAN NOTE
Increased due to family stressors  We discussed potential options to reduce stress  If interested in referral for psychotherapy she will contact the office

## 2022-09-29 NOTE — PROGRESS NOTES
Patient ID: Ling Oviedo is a 65 y o  female    Assessment/Plan:    Parkinson disease ([facility] )  Parkinsonian symptoms are fairly well controlled  She does not increased tremors more common when stressed  She has increased stressors in recent months  She has noted some wearing off around 3pm with left sided slowness and increase posturing/ dystonia of the left leg  Instructed to change the timing of Artane to see if this help with the afternoon off period  New schedule:   Sinemet 25/100 1 tab 4 times daily (9am, 1pm, 5pm and 9pm)  Artane 2mg tid (9am, 3pm, 9pm)  Clonazepam 0 5mg 1/2 tab qhs    If no improvement we could consider a rescue medication to be taken around this time  Start with extra 1 2 tab of Sinemet  Alternative could be Inbrija or apomorphine  Anxiety  Increased due to family stressors  We discussed potential options to reduce stress  If interested in referral for psychotherapy she will contact the office  Diagnoses and all orders for this visit:    Parkinson disease ([facility] )    Anxiety    Other orders  -     Multiple Vitamins-Minerals (OCUVITE ADULT 50+ PO); Take by mouth        Subjective:      Ling Oviedo is a  female, disabled, with breast cancer (BRRCA/ HRT r+) in 1990 & 2010 s/p surgery and chemotherapy , depression, Parkinson's disease and RLS,  who presents today for follow up  To review, symptoms began in 2014 with left sided bradykinesia and left pinky tremor  She was seen by a neurologist at  [facility] in [place] [address]  LINO scan was positive  Started on Sinemet up to 1 5 tid with response with later addition of Artane 2mg tid  First seen by me Dec 2019 with mild facial and hand dyskinesia  Also with episodes of tongue dryness and a sensation of swelling in the evenings, after dinner leading to a change in speech  She lives with her daughter, son In law and granddaughter   Mood has been well controlled on paroxetine 20mg qhs and duloxetine for years       Current relevant medications:  Sinemet 25/100 1 tab 4 times daily (9am, 1pm, 5pm and 9pm)  Artane 2mg tid (9am, 5pm, 9pm)  Clonazepam 0 5mg 1/2 tab qhs    Increased family stressors (taking care of 81 yo MIL)  She has imbalance and can have a hard time regaining balance if bending forward  Overall feels arms are weaker  She does do exercises at home and is trying to start a weight program at [facility]  Tremors are worse when stressed  She may be having some wearing off around 3pm daily  Speech is   There is no drooling  No difficulty chewing and swallowing  Dressing and hygiene acts performed without difficulty  Sleeps well  There is no daytime sedation  She has urinary leakage  There are no experiences with lightheadedness on standing  Cognition is unchanged  Finances are managed by the patient  There is no difficulty with household tasks  No hallucinations  Sleeping better with clonazepam 0 25mg qhs  She now sleeps alone so she is not sure if she is having dream enactment  She does have RLS symptoms at times but this variable  Objective:    /68 (BP Location: Right arm, Patient Position: Standing, Cuff Size: Standard)   Pulse 89   Wt 72 6 kg (160 lb 1 6 oz)   LMP  (LMP Unknown)   BMI 25 84 kg/m²       Physical Exam  Vitals reviewed  Eyes:      Extraocular Movements: Extraocular movements intact  Pupils: Pupils are equal, round, and reactive to light  Neurological:      Mental Status: She is alert  Deep Tendon Reflexes: Strength normal          Neurological Exam  Mental Status  Alert  Oriented to person, place, time and situation  Speech: hypophonia  Language is fluent with no aphasia  Attention and concentration are normal     Cranial Nerves  CN II: Visual fields full to confrontation  CN III, IV, VI: Extraocular movements intact bilaterally  Pupils equal round and reactive to light bilaterally    CN V: Facial sensation is normal   CN VII: Full and symmetric facial movement  CN VIII: Hearing is normal   CN IX, X: Palate elevates symmetrically  CN XI: Shoulder shrug strength is normal   CN XII: Tongue midline without atrophy or fasciculations  Motor   Normal muscle tone  Strength is 5/5 throughout all four extremities  Sensory  Light touch is normal in upper and lower extremities  Coordination  Right: Finger-to-nose normal  Rapid alternating movement abnormality:Left: Finger-to-nose normal  Rapid alternating movement abnormality:  See MDS UPDRS III  Gait  Casual gait: Normal pull test  Able to rise from chair without using arms  Left foot inversion, reduced stride on L>R        MDS UPDRS III                              3/23/22 9/29/22   Time since last dose: Took in office  2 hours   Speech  0 0   Facial Expression  0 0   Rigidity - Neck  0 0   Rigidity - Upper Extremity (Right)  0 0   Rigidity - Upper Extremity (Left)   0 0   Rigidity - Lower Extremity (Right)  0 0   Rigidity - Lower Extremity (Left)   0 0   Finger Taps (Right)   1 1   Finger Taps (Left)   2 1   Hand Movement (Right)  0 0   Hand Movement (Left)   1 1   Pronation/Supination (Right)  0 1   Pronation/Supination (Left)   1 2   Toe Tapping (Right) 0 1   Toe Tapping (Left) 2 2   Leg Agility (Right)  0 1   Leg Agility (Left)   1 1   Arising from Chair   0 0   Gait   1 1   Freezing of Gait 0 0   Postural Stability        Posture 0 0   Global spontaneity of movement 0 0   Postural Tremor (Right) 0 0   Postural Tremor (Left) 0 0   Kinetic Tremor (Right)  0 0   Kinetic Tremor (Left)  0 1   Rest tremor amplitude RUE 0 0   Rest tremor amplitude LUE 2 2   Rest tremor amplitude RLE 0 0   Reset tremor amplitude LLE 2 2   Lip/Jaw Tremor  0 0   Consistency of tremor 3 3   Motor Exam Total:          Left foot dystonia, equinovarus            Current Outpatient Medications on File Prior to Visit   Medication Sig Dispense Refill    Ascorbic Acid (VITAMIN C) 100 MG tablet Take 1,000 mg by mouth daily       b complex vitamins capsule Take 1 capsule by mouth daily      carbidopa-levodopa (SINEMET)  mg per tablet Take 1 tablet by mouth 4 (four) times a day 360 tablet 0    clonazePAM (KlonoPIN) 0 5 MG disintegrating tablet Take 1 tablet (0 5 mg total) by mouth daily at bedtime 30 tablet 0    DULoxetine (CYMBALTA) 60 mg delayed release capsule Take 1 capsule (60 mg total) by mouth daily 90 capsule 3    meloxicam (MOBIC) 7 5 mg tablet Take 7 5 mg by mouth      Multiple Vitamins-Minerals (MULTIVITAMIN WITH MINERALS) tablet Take 1 tablet by mouth daily      Multiple Vitamins-Minerals (OCUVITE ADULT 50+ PO) Take by mouth      PARoxetine (PAXIL) 20 mg tablet Take 1 tablet (20 mg total) by mouth daily 90 tablet 3    trihexyphenidyl (ARTANE) 2 mg tablet Take 1 tablet (2 mg total) by mouth 3 (three) times a day with meals 270 tablet 0    Zoster Vac Recomb Adjuvanted (SHINGRIX) 50 MCG/0 5ML SUSR 0 5mL IM for one dose, followed by 0 5mL IM 2-6 months after first dose 1 each 1    tiZANidine (ZANAFLEX) 2 mg tablet Take 2 mg by mouth (Patient not taking: Reported on 9/29/2022)       No current facility-administered medications on file prior to visit           Hubert Madden MD  Movement disorder physician  [id number]

## 2022-09-29 NOTE — ASSESSMENT & PLAN NOTE
Parkinsonian symptoms are fairly well controlled  She does not increased tremors more common when stressed  She has increased stressors in recent months  She has noted some wearing off around 3pm with left sided slowness and increase posturing/ dystonia of the left leg  Instructed to change the timing of Artane to see if this help with the afternoon off period  New schedule:   Sinemet 25/100 1 tab 4 times daily (9am, 1pm, 5pm and 9pm)  Artane 2mg tid (9am, 3pm, 9pm)  Clonazepam 0 5mg 1/2 tab qhs    If no improvement we could consider a rescue medication to be taken around this time  Start with extra 1 2 tab of Sinemet  Alternative could be Inbrija or apomorphine

## 2022-10-17 DIAGNOSIS — G20 PARKINSON DISEASE (HCC): ICD-10-CM

## 2022-10-17 RX ORDER — TRIHEXYPHENIDYL HYDROCHLORIDE 2 MG/1
2 TABLET ORAL
Qty: 270 TABLET | Refills: 0 | Status: SHIPPED | OUTPATIENT
Start: 2022-10-17

## 2022-11-16 DIAGNOSIS — F41.9 ANXIETY: ICD-10-CM

## 2022-11-16 DIAGNOSIS — G20 PARKINSON DISEASE (HCC): ICD-10-CM

## 2022-11-16 RX ORDER — CLONAZEPAM 0.5 MG/1
0.5 TABLET, ORALLY DISINTEGRATING ORAL
Qty: 30 TABLET | Refills: 1 | Status: SHIPPED | OUTPATIENT
Start: 2022-11-16

## 2022-11-16 NOTE — TELEPHONE ENCOUNTER
Pt left another vm regarding refills  States that she does not have any clonazeapm or carb/levo left    Asking for a call back if we can't refill    [phone]    TT sent to dr Chiqui Travis

## 2022-11-16 NOTE — TELEPHONE ENCOUNTER
Patient left another VM regarding pended scripts  States she is completely out of medication  Please call patient back as she states she has called several times      # [phone]

## 2022-11-16 NOTE — TELEPHONE ENCOUNTER
Patient calling in for refills of Carbidopa- Levodopa and Klonopin  Patient states she called on Friday and yesterday for refill of Carbidopa Levodopa  Patient is completely out of medication now  Requesting refills be sent to [facility] in [facility]# [phone]      Transcribed VM:  es, hi  I spoke to somebody yesterday I spoke to somebody on Friday  I am now totally out of my car, but I don't believe which as you know is not a good thing  The name is George Rosales, that's [facility]  And I need my car, I don't believe a dopa and I need my [place] pin for nighttime  [facility]  And I can't remember what else she asked me, my birthday [id number]  Dr Theodore Soares is my doctor  And you got the phone number [phone]  Thank you

## 2022-12-29 DIAGNOSIS — G20 PARKINSON DISEASE (HCC): ICD-10-CM

## 2022-12-29 RX ORDER — TRIHEXYPHENIDYL HYDROCHLORIDE 2 MG/1
2 TABLET ORAL
Qty: 270 TABLET | Refills: 3 | Status: SHIPPED | OUTPATIENT
Start: 2022-12-29

## 2022-12-29 NOTE — TELEPHONE ENCOUNTER
Patient left voicemail requesting refill of trihexyphenidyl to [facility] [place]      CB: [phone]

## 2023-01-27 ENCOUNTER — RA CDI HCC (OUTPATIENT)
Dept: OTHER | Facility: HOSPITAL | Age: 63
End: 2023-01-27

## 2023-01-27 NOTE — PROGRESS NOTES
[facility] [address]  coding opportunities       Chart reviewed, no opportunity found: CHART REVIEWED, NO OPPORTUNITY FOUND        Patients Insurance     Medicare Insurance: Medicare

## 2023-02-03 DIAGNOSIS — F32.0 MAJOR DEPRESSIVE DISORDER, SINGLE EPISODE, MILD (HCC): ICD-10-CM

## 2023-02-03 RX ORDER — DULOXETIN HYDROCHLORIDE 60 MG/1
60 CAPSULE, DELAYED RELEASE ORAL DAILY
Qty: 90 CAPSULE | Refills: 0 | Status: SHIPPED | OUTPATIENT
Start: 2023-02-03 | End: 2023-02-06 | Stop reason: SDUPTHER

## 2023-02-03 RX ORDER — DULOXETIN HYDROCHLORIDE 60 MG/1
60 CAPSULE, DELAYED RELEASE ORAL DAILY
Qty: 90 CAPSULE | Refills: 3 | Status: SHIPPED | OUTPATIENT
Start: 2023-02-03 | End: 2023-02-03 | Stop reason: SDUPTHER

## 2023-02-06 DIAGNOSIS — F32.0 MAJOR DEPRESSIVE DISORDER, SINGLE EPISODE, MILD (HCC): ICD-10-CM

## 2023-02-06 RX ORDER — DULOXETIN HYDROCHLORIDE 60 MG/1
60 CAPSULE, DELAYED RELEASE ORAL DAILY
Qty: 30 CAPSULE | Refills: 0 | Status: SHIPPED | OUTPATIENT
Start: 2023-02-06

## 2023-02-20 ENCOUNTER — OFFICE VISIT (OUTPATIENT)
Dept: FAMILY MEDICINE CLINIC | Facility: CLINIC | Age: 63
End: 2023-02-20

## 2023-02-20 VITALS
HEIGHT: 66 IN | TEMPERATURE: 97.7 F | DIASTOLIC BLOOD PRESSURE: 82 MMHG | BODY MASS INDEX: 25.33 KG/M2 | SYSTOLIC BLOOD PRESSURE: 124 MMHG | WEIGHT: 157.6 LBS | RESPIRATION RATE: 20 BRPM | OXYGEN SATURATION: 95 % | HEART RATE: 94 BPM

## 2023-02-20 DIAGNOSIS — Z13.6 SCREENING FOR CARDIOVASCULAR CONDITION: ICD-10-CM

## 2023-02-20 DIAGNOSIS — F41.9 ANXIETY: ICD-10-CM

## 2023-02-20 DIAGNOSIS — Z12.12 SCREENING FOR COLORECTAL CANCER: ICD-10-CM

## 2023-02-20 DIAGNOSIS — F32.0 MAJOR DEPRESSIVE DISORDER, SINGLE EPISODE, MILD (HCC): ICD-10-CM

## 2023-02-20 DIAGNOSIS — E78.2 MIXED HYPERLIPIDEMIA: ICD-10-CM

## 2023-02-20 DIAGNOSIS — G20 PARKINSON DISEASE (HCC): ICD-10-CM

## 2023-02-20 DIAGNOSIS — Z12.11 SCREENING FOR COLORECTAL CANCER: ICD-10-CM

## 2023-02-20 DIAGNOSIS — Z00.00 MEDICARE ANNUAL WELLNESS VISIT, SUBSEQUENT: Primary | ICD-10-CM

## 2023-02-20 DIAGNOSIS — Z13.1 SCREENING FOR DIABETES MELLITUS: ICD-10-CM

## 2023-02-20 RX ORDER — DULOXETIN HYDROCHLORIDE 60 MG/1
60 CAPSULE, DELAYED RELEASE ORAL DAILY
Qty: 90 CAPSULE | Refills: 3 | Status: SHIPPED | OUTPATIENT
Start: 2023-02-20 | End: 2023-05-21

## 2023-02-20 NOTE — PROGRESS NOTES
Assessment and Plan:     Problem List Items Addressed This Visit        Nervous and Auditory    Parkinson disease ([address] )     Follows with neurology, medication per specialist          Relevant Orders    Lipid panel    Comprehensive metabolic panel    CBC and differential    TSH, 3rd generation with Free T4 reflex    UA (URINE) with reflex to Scope       Other    Major depressive disorder, single episode, mild (HCC)     Stable on medication          Relevant Medications    DULoxetine (CYMBALTA) 60 mg delayed release capsule    Other Relevant Orders    Lipid panel    Comprehensive metabolic panel    CBC and differential    TSH, 3rd generation with Free T4 reflex    UA (URINE) with reflex to Scope    Mixed hyperlipidemia    Relevant Orders    Lipid panel    Comprehensive metabolic panel    CBC and differential    TSH, 3rd generation with Free T4 reflex    UA (URINE) with reflex to Scope    Anxiety    Relevant Orders    Lipid panel    Comprehensive metabolic panel    CBC and differential    TSH, 3rd generation with Free T4 reflex    UA (URINE) with reflex to Scope    BMI 25 0-25 9,adult     BMI Counseling: Body mass index is 25 44 kg/m²  The BMI is above normal  Nutrition recommendations include reducing portion sizes, decreasing overall calorie intake and 3-5 servings of fruits/vegetables daily  Exercise recommendations include vigorous aerobic physical activity for 75 minutes/week           Relevant Orders    Lipid panel    Comprehensive metabolic panel    CBC and differential    TSH, 3rd generation with Free T4 reflex    UA (URINE) with reflex to Scope   Other Visit Diagnoses     Medicare annual wellness visit, subsequent    -  Primary    Relevant Orders    Lipid panel    Comprehensive metabolic panel    Screening for diabetes mellitus        Relevant Orders    Lipid panel    Comprehensive metabolic panel    Screening for cardiovascular condition        Relevant Orders    Lipid panel    Comprehensive metabolic panel Screening for colorectal cancer        Relevant Orders    Cologuard           Preventive health issues were discussed with patient, and age appropriate screening tests were ordered as noted in patient's After Visit Summary  Personalized health advice and appropriate referrals for health education or preventive services given if needed, as noted in patient's After Visit Summary  History of Present Illness:     Patient presents for a Medicare Wellness Visit    HPI   Patient Care Team:  Alex Alvarenga DO as PCP - General (Family Medicine)  Alex Alvarenga DO as PCP - [address] [place] [address] (RTE)  Alex Alvarenga DO as PCP - [facility] (RTE)     Review of Systems:     Review of Systems   Constitutional: Negative for chills, fatigue and fever  HENT: Negative for congestion, postnasal drip, rhinorrhea and sinus pressure  Eyes: Negative for photophobia and visual disturbance  Respiratory: Negative for cough and shortness of breath  Cardiovascular: Negative for chest pain, palpitations and leg swelling  Gastrointestinal: Negative for abdominal pain, constipation, diarrhea, nausea and vomiting  Genitourinary: Negative for difficulty urinating and dysuria  Musculoskeletal: Negative for arthralgias and myalgias  Skin: Negative for color change and rash  Neurological: Negative for dizziness, weakness, light-headedness and headaches          Problem List:     Patient Active Problem List   Diagnosis   • Parkinson disease ([facility] [address] )   • Major depressive disorder, single episode, mild (HCC)   • Skin lesion   • Abnormal white blood cell (WBC)   • Mixed hyperlipidemia   • Anemia   • Anxiety   • Right thyroid nodule   • BMI 25 0-25 9,adult   • Rib pain   • Sprain of lateral collateral ligament of right knee   • Pes anserinus bursitis of right knee   • Benign essential microscopic hematuria      Past Medical and Surgical History:     Past Medical History:   Diagnosis Date   • Anxiety    • Breast cancer ([facility] )     bilateral   • Depression    • Parkinson disease ([facility] )    • Squamous cell skin cancer 11/23/2020    SCCIS right clavicle     Past Surgical History:   Procedure Laterality Date   • BREAST RECONSTRUCTION Bilateral    • GANGLION CYST EXCISION Left     leg   • MASTECTOMY Bilateral    • MOHS SURGERY  01/06/2021    SCCIS right clavicle, Dr Arianne Raya    • SKIN BIOPSY     • TONSILLECTOMY        Family History:     Family History   Problem Relation Age of Onset   • Stroke Mother    • Basal cell carcinoma Mother    • Cancer Father    • Stroke Brother       Social History:     Social History     Socioeconomic History   • Marital status:      Spouse name: None   • Number of children: 2   • Years of education: None   • Highest education level: None   Occupational History   • None   Tobacco Use   • Smoking status: Never   • Smokeless tobacco: Never   Vaping Use   • Vaping Use: Never used   Substance and Sexual Activity   • Alcohol use: Yes     Comment: rare   • Drug use: Never   • Sexual activity: Not Currently   Other Topics Concern   • None   Social History Narrative   • None     Social Determinants of Health     Financial Resource Strain: Low Risk    • Difficulty of Paying Living Expenses: Not hard at all   Food Insecurity: Not on file   Transportation Needs: No Transportation Needs   • Lack of Transportation (Medical): No   • Lack of Transportation (Non-Medical):  No   Physical Activity: Not on file   Stress: Not on file   Social Connections: Not on file   Intimate Partner Violence: Not At Risk   • Fear of Current or Ex-Partner: No   • Emotionally Abused: No   • Physically Abused: No   • Sexually Abused: No   Housing Stability: Not on file      Medications and Allergies:     Current Outpatient Medications   Medication Sig Dispense Refill   • Ascorbic Acid (VITAMIN C) 100 MG tablet Take 1,000 mg by mouth daily      • b complex vitamins capsule Take 1 capsule by mouth daily     • carbidopa-levodopa (SINEMET)  mg per tablet Take 1 tablet by mouth 4 (four) times a day 360 tablet 5   • clonazePAM (KlonoPIN) 0 5 MG disintegrating tablet Take 1 tablet (0 5 mg total) by mouth daily at bedtime 30 tablet 1   • DULoxetine (CYMBALTA) 60 mg delayed release capsule Take 1 capsule (60 mg total) by mouth daily 90 capsule 3   • MAGNESIUM PO Take by mouth     • meloxicam (MOBIC) 7 5 mg tablet Take 7 5 mg by mouth     • Multiple Vitamins-Minerals (MULTIVITAMIN WITH MINERALS) tablet Take 1 tablet by mouth daily     • Multiple Vitamins-Minerals (OCUVITE ADULT 50+ PO) Take by mouth     • PARoxetine (PAXIL) 20 mg tablet Take 1 tablet (20 mg total) by mouth daily 90 tablet 3   • trihexyphenidyl (ARTANE) 2 mg tablet Take 1 tablet (2 mg total) by mouth 3 (three) times a day with meals (Patient taking differently: Take 2 mg by mouth 4 (four) times a day (with meals and at bedtime)) 270 tablet 3   • tiZANidine (ZANAFLEX) 2 mg tablet Take 2 mg by mouth (Patient not taking: Reported on 9/29/2022)     • Zoster Vac Recomb Adjuvanted (SHINGRIX) 50 MCG/0 5ML SUSR 0 5mL IM for one dose, followed by 0 5mL IM 2-6 months after first dose (Patient not taking: Reported on 2/20/2023) 1 each 1     No current facility-administered medications for this visit       No Known Allergies   Immunizations:     Immunization History   Administered Date(s) Administered   • COVID-19 PFIZER VACCINE 0 3 ML IM 04/08/2021, 04/29/2021   • INFLUENZA 12/13/2022   • Influenza, injectable, quadrivalent, preservative free 0 5 mL 10/13/2019   • Influenza, recombinant, quadrivalent,injectable, preservative free 12/07/2020   • Pneumococcal Polysaccharide PPV23 09/12/2019   • Tdap 09/12/2019   • Tuberculin Skin Test-PPD Intradermal 11/04/2019, 09/21/2021   • Zoster Vaccine Recombinant 12/11/2020, 08/25/2021   • influenza, injectable, quadrivalent 08/25/2021      Health Maintenance:         Topic Date Due   • Colorectal Cancer Screening  Never done   • HIV Screening Completed   • Hepatitis C Screening  Completed         Topic Date Due   • COVID-19 Vaccine (3 - Booster for Pfizer series) 06/24/2021      Medicare Screening Tests and Risk Assessments:     Val Mcghee is here for her Subsequent Wellness visit  Last Medicare Wellness visit information reviewed, patient interviewed, no change since last AWV  Health Risk Assessment:   Patient rates overall health as good  Patient feels that their physical health rating is same  Patient is dissatisfied with their life  Eyesight was rated as same  Hearing was rated as same  Patient feels that their emotional and mental health rating is slightly worse  Patients states they are never, rarely angry  Patient states they are sometimes unusually tired/fatigued  Pain experienced in the last 7 days has been none  Patient states that she has experienced no weight loss or gain in last 6 months  Depression Screening:   PHQ-9 Score: 2      Fall Risk Screening: In the past year, patient has experienced: no history of falling in past year      Urinary Incontinence Screening:   Patient has not leaked urine accidently in the last six months  Home Safety:  Patient does not have trouble with stairs inside or outside of their home  Patient has working smoke alarms and has working carbon monoxide detector  Home safety hazards include: none  Nutrition:   Current diet is Regular  Medications:   Patient is not currently taking any over-the-counter supplements  Patient is able to manage medications  Activities of Daily Living (ADLs)/Instrumental Activities of Daily Living (IADLs):   Walk and transfer into and out of bed and chair?: Yes  Dress and groom yourself?: Yes    Bathe or shower yourself?: Yes    Feed yourself?  Yes  Do your laundry/housekeeping?: Yes  Manage your money, pay your bills and track your expenses?: Yes  Make your own meals?: Yes    Do your own shopping?: Yes    Previous Hospitalizations:   Any hospitalizations or ED visits within the last 12 months?: No      Advance Care Planning:   Living will: No    Advanced directive: Yes    Advanced directive counseling given: Yes      Cognitive Screening:   Provider or family/friend/caregiver concerned regarding cognition?: No    PREVENTIVE SCREENINGS      Cardiovascular Screening:    General: Screening Not Indicated, History Lipid Disorder and Risks and Benefits Discussed    Due for: Lipid Panel      Diabetes Screening:     General: Risks and Benefits Discussed    Due for: Blood Glucose      Colorectal Cancer Screening:     General: Risks and Benefits Discussed    Due for: Cologuard      Breast Cancer Screening:     General: Screening Not Indicated and History Breast Cancer      Lung Cancer Screening:     General: Screening Not Indicated      Hepatitis C Screening:    General: Screening Current    Screening, Brief Intervention, and Referral to Treatment (SBIRT)    Screening  Typical number of drinks in a day: 0  Typical number of drinks in a week: 0  Interpretation: Low risk drinking behavior  Single Item Drug Screening:  How often have you used an illegal drug (including marijuana) or a prescription medication for non-medical reasons in the past year? never    Single Item Drug Screen Score: 0  Interpretation: Negative screen for possible drug use disorder    No results found  Physical Exam:     /82   Pulse 94   Temp 97 7 °F (36 5 °C) (Tympanic)   Resp 20   Ht 5' 6" (1 676 m)   Wt 71 5 kg (157 lb 9 6 oz)   LMP  (LMP Unknown)   SpO2 95%   BMI 25 44 kg/m²     Physical Exam  Constitutional:       General: She is not in acute distress  Appearance: Normal appearance  She is not ill-appearing, toxic-appearing or diaphoretic  HENT:      Head: Normocephalic and atraumatic  Right Ear: Tympanic membrane and ear canal normal       Left Ear: Tympanic membrane and ear canal normal       Nose: Nose normal  No congestion        Mouth/Throat:      Mouth: Mucous membranes are moist       Pharynx: Oropharynx is clear  No oropharyngeal exudate  Eyes:      Extraocular Movements: Extraocular movements intact  Conjunctiva/sclera: Conjunctivae normal       Pupils: Pupils are equal, round, and reactive to light  Cardiovascular:      Rate and Rhythm: Normal rate and regular rhythm  Pulses: Normal pulses  Heart sounds: No murmur heard  Pulmonary:      Effort: Pulmonary effort is normal       Breath sounds: Normal breath sounds  No wheezing, rhonchi or rales  Abdominal:      General: Bowel sounds are normal  There is no distension  Palpations: Abdomen is soft  Tenderness: There is no abdominal tenderness  Musculoskeletal:         General: No swelling or tenderness  Normal range of motion  Cervical back: Normal range of motion and neck supple  Skin:     General: Skin is warm and dry  Capillary Refill: Capillary refill takes less than 2 seconds  Neurological:      General: No focal deficit present  Mental Status: She is alert and oriented to person, place, and time  Cranial Nerves: No cranial nerve deficit  Psychiatric:         Mood and Affect: Mood normal          Behavior: Behavior normal          Thought Content:  Thought content normal           Alex Alvarenga DO

## 2023-02-20 NOTE — PATIENT INSTRUCTIONS
Medicare Preventive Visit Patient Instructions  Thank you for completing your Welcome to Medicare Visit or Medicare Annual Wellness Visit today  Your next wellness visit will be due in one year (2/21/2024)  The screening/preventive services that you may require over the next 5-10 years are detailed below  Some tests may not apply to you based off risk factors and/or age  Screening tests ordered at today's visit but not completed yet may show as past due  Also, please note that scanned in results may not display below  Preventive Screenings:  Service Recommendations Previous Testing/Comments   Colorectal Cancer Screening  * Colonoscopy    * Fecal Occult Blood Test (FOBT)/Fecal Immunochemical Test (FIT)  * Fecal DNA/Cologuard Test  * Flexible Sigmoidoscopy Age: 39-70 years old   Colonoscopy: every 10 years (may be performed more frequently if at higher risk)  OR  FOBT/FIT: every 1 year  OR  Cologuard: every 3 years  OR  Sigmoidoscopy: every 5 years  Screening may be recommended earlier than age 39 if at higher risk for colorectal cancer  Also, an individualized decision between you and your healthcare provider will decide whether screening between the ages of 74-80 would be appropriate  Colonoscopy: Not on file  FOBT/FIT: Not on file  Cologuard: Not on file  Sigmoidoscopy: Not on file          Breast Cancer Screening Age: 36 years old  Frequency: every 1-2 years  Not required if history of left and right mastectomy Mammogram: Not on file        Cervical Cancer Screening Between the ages of 21-29, pap smear recommended once every 3 years  Between the ages of 33-67, can perform pap smear with HPV co-testing every 5 years     Recommendations may differ for women with a history of total hysterectomy, cervical cancer, or abnormal pap smears in past  Pap Smear: Not on file        Hepatitis C Screening Once for adults born between [facility]  More frequently in patients at high risk for Hepatitis C Hep C Antibody: 10/25/2019        Diabetes Screening 1-2 times per year if you're at risk for diabetes or have pre-diabetes Fasting glucose: No results in last 5 years (No results in last 5 years)  A1C: 5 4 (1/22/2019)      Cholesterol Screening Once every 5 years if you don't have a lipid disorder  May order more often based on risk factors  Lipid panel: 09/16/2021          Other Preventive Screenings Covered by Medicare:  1  Abdominal Aortic Aneurysm (AAA) Screening: covered once if your at risk  You're considered to be at risk if you have a family history of AAA  2  Lung Cancer Screening: covers low dose CT scan once per year if you meet all of the following conditions: (1) Age 50-69; (2) No signs or symptoms of lung cancer; (3) Current smoker or have quit smoking within the last 15 years; (4) You have a tobacco smoking history of at least 20 pack years (packs per day multiplied by number of years you smoked); (5) You get a written order from a healthcare provider  3  Glaucoma Screening: covered annually if you're considered high risk: (1) You have diabetes OR (2) Family history of glaucoma OR (3)  aged 48 and older OR (3)  American aged 72 and older  3  Osteoporosis Screening: covered every 2 years if you meet one of the following conditions: (1) You're estrogen deficient and at risk for osteoporosis based off medical history and other findings; (2) Have a vertebral abnormality; (3) On glucocorticoid therapy for more than 3 months; (4) Have primary hyperparathyroidism; (5) On osteoporosis medications and need to assess response to drug therapy  · Last bone density test (DXA Scan): Not on file  5  HIV Screening: covered annually if you're between the age of 12-76  Also covered annually if you are younger than 13 and older than 72 with risk factors for HIV infection  For pregnant patients, it is covered up to 3 times per pregnancy      Immunizations:  Immunization Recommendations   Influenza Vaccine Annual influenza vaccination during flu season is recommended for all persons aged >= 6 months who do not have contraindications   Pneumococcal Vaccine   * Pneumococcal conjugate vaccine = PCV13 (Prevnar 13), PCV15 (Vaxneuvance), PCV20 (Prevnar 20)  * Pneumococcal polysaccharide vaccine = PPSV23 (Pneumovax) Adults 25-60 years old: 1-3 doses may be recommended based on certain risk factors  Adults 72 years old: 1-2 doses may be recommended based off what pneumonia vaccine you previously received   Hepatitis B Vaccine 3 dose series if at intermediate or high risk (ex: diabetes, end stage renal disease, liver disease)   Tetanus (Td) Vaccine - COST NOT COVERED BY MEDICARE PART B Following completion of primary series, a booster dose should be given every 10 years to maintain immunity against tetanus  Td may also be given as tetanus wound prophylaxis  Tdap Vaccine - COST NOT COVERED BY MEDICARE PART B Recommended at least once for all adults  For pregnant patients, recommended with each pregnancy  Shingles Vaccine (Shingrix) - COST NOT COVERED BY MEDICARE PART B  2 shot series recommended in those aged 48 and above     Health Maintenance Due:      Topic Date Due   • Colorectal Cancer Screening  Never done   • HIV Screening  Completed   • Hepatitis C Screening  Completed     Immunizations Due:      Topic Date Due   • COVID-19 Vaccine (3 - Booster for [place] series) 06/24/2021     Advance Directives   What are advance directives? Advance directives are legal documents that state your wishes and plans for medical care  These plans are made ahead of time in case you lose your ability to make decisions for yourself  Advance directives can apply to any medical decision, such as the treatments you want, and if you want to donate organs  What are the types of advance directives? There are many types of advance directives, and each state has rules about how to use them   You may choose a combination of any of the following:  · Living will: This is a written record of the treatment you want  You can also choose which treatments you do not want, which to limit, and which to stop at a certain time  This includes surgery, medicine, IV fluid, and tube feedings  · Durable power of  for healthcare [facility]): This is a written record that states who you want to make healthcare choices for you when you are unable to make them for yourself  This person, called a proxy, is usually a family member or a friend  You may choose more than 1 proxy  · Do not resuscitate (DNR) order:  A DNR order is used in case your heart stops beating or you stop breathing  It is a request not to have certain forms of treatment, such as CPR  A DNR order may be included in other types of advance directives  · Medical directive: This covers the care that you want if you are in a coma, near death, or unable to make decisions for yourself  You can list the treatments you want for each condition  Treatment may include pain medicine, surgery, blood transfusions, dialysis, IV or tube feedings, and a ventilator (breathing machine)  · Values history: This document has questions about your views, beliefs, and how you feel and think about life  This information can help others choose the care that you would choose  Why are advance directives important? An advance directive helps you control your care  Although spoken wishes may be used, it is better to have your wishes written down  Spoken wishes can be misunderstood, or not followed  Treatments may be given even if you do not want them  An advance directive may make it easier for your family to make difficult choices about your care  Urinary Incontinence   Urinary incontinence (UI)  is when you lose control of your bladder  UI develops because your bladder cannot store or empty urine properly  The 3 most common types of UI are stress incontinence, urge incontinence, or both    Medicines:   · May be given to help strengthen your bladder control  Report any side effects of medication to your healthcare provider  Do pelvic muscle exercises often:  Your pelvic muscles help you stop urinating  Squeeze these muscles tight for 5 seconds, then relax for 5 seconds  Gradually work up to squeezing for 10 seconds  Do 3 sets of 15 repetitions a day, or as directed  This will help strengthen your pelvic muscles and improve bladder control  Train your bladder:  Go to the bathroom at set times, such as every 2 hours, even if you do not feel the urge to go  You can also try to hold your urine when you feel the urge to go  For example, hold your urine for 5 minutes when you feel the urge to go  As that becomes easier, hold your urine for 10 minutes  Self-care:   · Keep a UI record  Write down how often you leak urine and how much you leak  Make a note of what you were doing when you leaked urine  · Drink liquids as directed  You may need to limit the amount of liquid you drink to help control your urine leakage  Do not drink any liquid right before you go to bed  Limit or do not have drinks that contain caffeine or alcohol  · Prevent constipation  Eat a variety of high-fiber foods  Good examples are high-fiber cereals, beans, vegetables, and whole-grain breads  Walking is the best way to trigger your intestines to have a bowel movement  · Exercise regularly and maintain a healthy weight  Weight loss and exercise will decrease pressure on your bladder and help you control your leakage  · Use a catheter as directed  to help empty your bladder  A catheter is a tiny, plastic tube that is put into your bladder to drain your urine  · Go to behavior therapy as directed  Behavior therapy may be used to help you learn to control your urge to urinate      Weight Management   Why it is important to manage your weight:  Being overweight increases your risk of health conditions such as heart disease, high blood pressure, type 2 diabetes, and certain types of cancer  It can also increase your risk for osteoarthritis, sleep apnea, and other respiratory problems  Aim for a slow, steady weight loss  Even a small amount of weight loss can lower your risk of health problems  How to lose weight safely:  A safe and healthy way to lose weight is to eat fewer calories and get regular exercise  You can lose up about 1 pound a week by decreasing the number of calories you eat by 500 calories each day  Healthy meal plan for weight management:  A healthy meal plan includes a variety of foods, contains fewer calories, and helps you stay healthy  A healthy meal plan includes the following:  · Eat whole-grain foods more often  A healthy meal plan should contain fiber  Fiber is the part of grains, fruits, and vegetables that is not broken down by your body  Whole-grain foods are healthy and provide extra fiber in your diet  Some examples of whole-grain foods are whole-wheat breads and pastas, oatmeal, brown rice, and bulgur  · Eat a variety of vegetables every day  Include dark, leafy greens such as spinach, kale, melissa greens, and mustard greens  Eat yellow and orange vegetables such as carrots, sweet potatoes, and winter squash  · Eat a variety of fruits every day  Choose fresh or canned fruit (canned in its own juice or light syrup) instead of juice  Fruit juice has very little or no fiber  · Eat low-fat dairy foods  Drink fat-free (skim) milk or 1% milk  Eat fat-free yogurt and low-fat cottage cheese  Try low-fat cheeses such as mozzarella and other reduced-fat cheeses  · Choose meat and other protein foods that are low in fat  Choose beans or other legumes such as split peas or lentils  Choose fish, skinless poultry (chicken or turkey), or lean cuts of red meat (beef or pork)  Before you cook meat or poultry, cut off any visible fat  · Use less fat and oil  Try baking foods instead of frying them   Add less fat, such as margarine, sour cream, regular salad dressing and mayonnaise to foods  Eat fewer high-fat foods  Some examples of high-fat foods include french fries, doughnuts, ice cream, and cakes  · Eat fewer sweets  Limit foods and drinks that are high in sugar  This includes candy, cookies, regular soda, and sweetened drinks  Exercise:  Exercise at least 30 minutes per day on most days of the week  Some examples of exercise include walking, biking, dancing, and swimming  You can also fit in more physical activity by taking the stairs instead of the elevator or parking farther away from stores  Ask your healthcare provider about the best exercise plan for you  © Copyright [employer] 2018 Information is for End User's use only and may not be sold, redistributed or otherwise used for commercial purposes   All illustrations and images included in CareNotes® are the copyrighted property of A D A M , Inc  or [address] [place] [address]

## 2023-02-22 LAB
ALBUMIN SERPL-MCNC: 4.4 G/DL (ref 3.6–5.1)
ALBUMIN/GLOB SERPL: 1.8 (CALC) (ref 1–2.5)
ALP SERPL-CCNC: 66 U/L (ref 37–153)
ALT SERPL-CCNC: 14 U/L (ref 6–29)
APPEARANCE UR: CLEAR
AST SERPL-CCNC: 12 U/L (ref 10–35)
BACTERIA UR QL AUTO: ABNORMAL /HPF
BASOPHILS # BLD AUTO: 82 CELLS/UL (ref 0–200)
BASOPHILS NFR BLD AUTO: 1.9 %
BILIRUB SERPL-MCNC: 0.6 MG/DL (ref 0.2–1.2)
BILIRUB UR QL STRIP: NEGATIVE
BUN SERPL-MCNC: 20 MG/DL (ref 7–25)
BUN/CREAT SERPL: NORMAL (CALC) (ref 6–22)
CALCIUM SERPL-MCNC: 10 MG/DL (ref 8.6–10.4)
CHLORIDE SERPL-SCNC: 103 MMOL/L (ref 98–110)
CHOLEST SERPL-MCNC: 276 MG/DL
CHOLEST/HDLC SERPL: 2.5 (CALC)
CO2 SERPL-SCNC: 30 MMOL/L (ref 20–32)
COLOR UR: YELLOW
CREAT SERPL-MCNC: 0.76 MG/DL (ref 0.5–1.05)
EOSINOPHIL # BLD AUTO: 254 CELLS/UL (ref 15–500)
EOSINOPHIL NFR BLD AUTO: 5.9 %
ERYTHROCYTE [DISTWIDTH] IN BLOOD BY AUTOMATED COUNT: 12.9 % (ref 11–15)
GFR/BSA.PRED SERPLBLD CYS-BASED-ARV: 89 ML/MIN/1.73M2
GLOBULIN SER CALC-MCNC: 2.4 G/DL (CALC) (ref 1.9–3.7)
GLUCOSE SERPL-MCNC: 87 MG/DL (ref 65–99)
GLUCOSE UR QL STRIP: NEGATIVE
HCT VFR BLD AUTO: 35.7 % (ref 35–45)
HDLC SERPL-MCNC: 109 MG/DL
HGB BLD-MCNC: 11.9 G/DL (ref 11.7–15.5)
HGB UR QL STRIP: NEGATIVE
HYALINE CASTS #/AREA URNS LPF: ABNORMAL /LPF
KETONES UR QL STRIP: NEGATIVE
LDLC SERPL CALC-MCNC: 152 MG/DL (CALC)
LEUKOCYTE ESTERASE UR QL STRIP: ABNORMAL
LYMPHOCYTES # BLD AUTO: 1299 CELLS/UL (ref 850–3900)
LYMPHOCYTES NFR BLD AUTO: 30.2 %
MCH RBC QN AUTO: 29.8 PG (ref 27–33)
MCHC RBC AUTO-ENTMCNC: 33.3 G/DL (ref 32–36)
MCV RBC AUTO: 89.5 FL (ref 80–100)
MONOCYTES # BLD AUTO: 361 CELLS/UL (ref 200–950)
MONOCYTES NFR BLD AUTO: 8.4 %
NEUTROPHILS # BLD AUTO: 2305 CELLS/UL (ref 1500–7800)
NEUTROPHILS NFR BLD AUTO: 53.6 %
NITRITE UR QL STRIP: NEGATIVE
NONHDLC SERPL-MCNC: 167 MG/DL (CALC)
PH UR STRIP: 7 [PH] (ref 5–8)
PLATELET # BLD AUTO: 368 THOUSAND/UL (ref 140–400)
PMV BLD REES-ECKER: 9.2 FL (ref 7.5–12.5)
POTASSIUM SERPL-SCNC: 4.7 MMOL/L (ref 3.5–5.3)
PROT SERPL-MCNC: 6.8 G/DL (ref 6.1–8.1)
PROT UR QL STRIP: NEGATIVE
RBC # BLD AUTO: 3.99 MILLION/UL (ref 3.8–5.1)
RBC #/AREA URNS HPF: ABNORMAL /HPF
SODIUM SERPL-SCNC: 140 MMOL/L (ref 135–146)
SP GR UR STRIP: 1.02 (ref 1–1.03)
SQUAMOUS #/AREA URNS HPF: ABNORMAL /HPF
TRIGL SERPL-MCNC: 62 MG/DL
TSH SERPL-ACNC: 1.81 MIU/L (ref 0.4–4.5)
WBC # BLD AUTO: 4.3 THOUSAND/UL (ref 3.8–10.8)
WBC #/AREA URNS HPF: ABNORMAL /HPF

## 2023-02-23 DIAGNOSIS — F41.9 ANXIETY: ICD-10-CM

## 2023-02-23 RX ORDER — CLONAZEPAM 0.5 MG/1
0.5 TABLET, ORALLY DISINTEGRATING ORAL
Qty: 30 TABLET | Refills: 1 | Status: SHIPPED | OUTPATIENT
Start: 2023-02-23

## 2023-02-23 NOTE — TELEPHONE ENCOUNTER
Patient left voicemail requesting refill of clonazepam     [facility] [address]  Call returned to patient, [phone]  Acknowledged voicemail received and will be forwarded to provider

## 2023-03-22 ENCOUNTER — OFFICE VISIT (OUTPATIENT)
Dept: OBGYN CLINIC | Facility: CLINIC | Age: 63
End: 2023-03-22

## 2023-03-22 VITALS
WEIGHT: 156 LBS | BODY MASS INDEX: 25.07 KG/M2 | SYSTOLIC BLOOD PRESSURE: 150 MMHG | DIASTOLIC BLOOD PRESSURE: 82 MMHG | HEIGHT: 66 IN

## 2023-03-22 DIAGNOSIS — M25.561 CHRONIC PAIN OF RIGHT KNEE: Primary | ICD-10-CM

## 2023-03-22 DIAGNOSIS — G89.29 CHRONIC PAIN OF RIGHT KNEE: Primary | ICD-10-CM

## 2023-03-22 NOTE — ASSESSMENT & PLAN NOTE
Findings consistent with chronic right knee pain, possible medial meniscus tear versus anterior medial plica  Findings and treatment options were discussed with the patient  Patient continues to have mechanical symptoms despite conservative treatment with cortisone injections, NSAIDs and physical therapy  Recommend MRI of the right knee to further evaluate the joint  She will follow-up after the MRI and I will go over further treatment recommendations at that time  All patient's questions were answered to her satisfaction  This note is created using dictation transcription  It may contain typographical errors, grammatical errors, improperly dictated words, background noise and other errors

## 2023-03-22 NOTE — PROGRESS NOTES
Assessment:     1  Chronic pain of right knee        Plan:       Problem List Items Addressed This Visit        Other    Chronic pain of right knee - Primary     Findings consistent with chronic right knee pain, possible medial meniscus tear versus anterior medial plica  Findings and treatment options were discussed with the patient  Patient continues to have mechanical symptoms despite conservative treatment with cortisone injections, NSAIDs and physical therapy  Recommend MRI of the right knee to further evaluate the joint  She will follow-up after the MRI and I will go over further treatment recommendations at that time  All patient's questions were answered to her satisfaction  This note is created using dictation transcription  It may contain typographical errors, grammatical errors, improperly dictated words, background noise and other errors  Relevant Orders    MRI knee right  wo contrast       Subjective:     Patient ID: Joya Briceño is a 65 y o  female  Chief Complaint: This is a 31-year-old white female here for follow-up of right knee pain  She was last seen 1 year ago and treated for a right knee sprain and pes anserine bursitis  She did have an intra-articular cortisone injection and cortisone injection to the pes bursa and states they both provided relief for her  Pain eventually returned and now she complains of sharp pain over the medial aspect of the knee  She also has a painful pop along the anterior medial aspect of the knee when knee is flexed for prolonged period of time and she extends it  She feels significant pain when first weightbearing from a seated position and feels her knee is going to give out on her  Denies any locking or catching  No treatment as of yet recently      Allergy:  No Known Allergies  Medications:  all current active meds have been reviewed  Past Medical History:  Past Medical History:   Diagnosis Date   • Anxiety    • [facility]   • Depression    • Parkinson disease ([facility] )    • Squamous cell skin cancer 11/23/2020    SCCIS right clavicle     Past Surgical History:  Past Surgical History:   Procedure Laterality Date   • BREAST RECONSTRUCTION Bilateral    • GANGLION CYST EXCISION Left     leg   • MASTECTOMY Bilateral    • MOHS SURGERY  01/06/2021    SCCIS right clavicle, Dr Latoya Call    • SKIN BIOPSY     • TONSILLECTOMY       Family History:  Family History   Problem Relation Age of Onset   • Stroke Mother    • Basal cell carcinoma Mother    • Cancer Father    • Stroke Brother      Social History:  Social History     Substance and Sexual Activity   Alcohol Use Yes    Comment: rare     Social History     Substance and Sexual Activity   Drug Use Never     Social History     Tobacco Use   Smoking Status Never   Smokeless Tobacco Never     Review of Systems   Constitutional: Negative for chills and fever  HENT: Negative for ear pain and sore throat  Eyes: Negative for pain and visual disturbance  Respiratory: Negative for cough and shortness of breath  Cardiovascular: Negative for chest pain and palpitations  Gastrointestinal: Negative for abdominal pain and vomiting  Genitourinary: Negative for dysuria and hematuria  Musculoskeletal: Positive for arthralgias (Right knee) and gait problem (antalgic)  Negative for back pain and joint swelling  Skin: Negative for color change and rash  Neurological: Negative for seizures and syncope  Psychiatric/Behavioral: Negative  All other systems reviewed and are negative  Objective:  BP Readings from Last 1 Encounters:   03/22/23 150/82      Wt Readings from Last 1 Encounters:   03/22/23 70 8 kg (156 lb)      BMI:   Estimated body mass index is 25 18 kg/m² as calculated from the following:    Height as of this encounter: 5' 6" (1 676 m)  Weight as of this encounter: 70 8 kg (156 lb)    BSA:   Estimated body surface area is 1 8 meters squared as calculated from the following:    Height as of this encounter: 5' 6" (1 676 m)  Weight as of this encounter: 70 8 kg (156 lb)  Physical Exam  Vitals and nursing note reviewed  Constitutional:       Appearance: Normal appearance  She is well-developed  HENT:      Head: Normocephalic and atraumatic  Right Ear: External ear normal       Left Ear: External ear normal    Eyes:      Extraocular Movements: Extraocular movements intact  Conjunctiva/sclera: Conjunctivae normal    Pulmonary:      Effort: Pulmonary effort is normal    Musculoskeletal:      Cervical back: Neck supple  Right knee: No effusion  Instability Tests: Medial Leonardo test positive  Lateral Leonardo test negative  Skin:     General: Skin is warm and dry  Neurological:      Mental Status: She is alert and oriented to person, place, and time  Deep Tendon Reflexes: Reflexes are normal and symmetric  Psychiatric:         Mood and Affect: Mood normal          Behavior: Behavior normal        Right Knee Exam     Muscle Strength   The patient has normal right knee strength  Tenderness   The patient is experiencing tenderness in the pes anserinus and medial joint line (medial femoral condyle)  Range of Motion   The patient has normal right knee ROM  Tests   Leonardo:  Medial - positive Lateral - negative  Varus: negative Valgus: negative  Patellar apprehension: negative    Other   Erythema: absent  Scars: absent  Sensation: normal  Pulse: present  Swelling: none  Effusion: no effusion present    Comments:  Mild crepitation with motion             No new imaging       Scribe Attestation    I,:  Rosalee Hoyos PA-C am acting as a scribe while in the presence of the attending physician :       I,:  Noel Palma MD personally performed the services described in this documentation    as scribed in my presence :

## 2023-04-18 PROBLEM — M17.11 PRIMARY OSTEOARTHRITIS OF RIGHT KNEE: Status: ACTIVE | Noted: 2023-03-22

## 2023-05-08 ENCOUNTER — TELEPHONE (OUTPATIENT)
Dept: NEUROLOGY | Facility: CLINIC | Age: 63
End: 2023-05-08

## 2023-05-08 DIAGNOSIS — F41.9 ANXIETY: ICD-10-CM

## 2023-05-08 RX ORDER — PAROXETINE HYDROCHLORIDE 20 MG/1
TABLET, FILM COATED ORAL
Qty: 90 TABLET | Refills: 0 | Status: SHIPPED | OUTPATIENT
Start: 2023-05-08

## 2023-05-08 NOTE — TELEPHONE ENCOUNTER
ADD ON:    RESCHEDULED PT FOR 5/10/23 @ 830AM WITH DR Ok Tam OFFICE    NURSE TEAM:  PT ALSO NEEDS REFILL ON CLONAZEPAM

## 2023-05-09 DIAGNOSIS — F41.9 ANXIETY: ICD-10-CM

## 2023-05-09 RX ORDER — CLONAZEPAM 0.5 MG/1
0.5 TABLET, ORALLY DISINTEGRATING ORAL
Qty: 30 TABLET | Refills: 1 | Status: SHIPPED | OUTPATIENT
Start: 2023-05-09 | End: 2023-08-07 | Stop reason: SDUPTHER

## 2023-05-24 ENCOUNTER — OFFICE VISIT (OUTPATIENT)
Dept: NEUROLOGY | Facility: CLINIC | Age: 63
End: 2023-05-24

## 2023-05-24 VITALS
BODY MASS INDEX: 25.73 KG/M2 | WEIGHT: 159.4 LBS | DIASTOLIC BLOOD PRESSURE: 72 MMHG | SYSTOLIC BLOOD PRESSURE: 124 MMHG | HEART RATE: 106 BPM

## 2023-05-24 DIAGNOSIS — G20 PARKINSON DISEASE (HCC): ICD-10-CM

## 2023-05-24 NOTE — ASSESSMENT & PLAN NOTE
Yoko Zaragoza is a  female, disabled, with breast cancer (BRRCA/ HRT r+) in 1990 & 2010 s/p surgery and chemotherapy , depression, Parkinson's disease and RLS,  who presents today for follow up for her PD  LOV w/ Dr Edwar Doshi 09/29/22  Parkinsonian symptoms are fairly well controlled  She does not increased tremors more common when stressed  She has increased stressors in recent months  She has noted some wearing off around 3pm with left sided slowness and increase posturing/ dystonia of the left leg  Typically takes her PD meds and trmeor improves after 30 minutes       Current meds at this visit 05/24/2023  Sinemet 25/100 1 tab 4 times daily (9am, 1pm, 5pm and 9pm)  Artane 2mg tid (9am, 3pm, 9pm)  Clonazepam 0 5mg 1/2 tab qhs      Staffed w/ Dr Edwar Doshi at [facility]   -  increase sinemet dose to 1 5 tabs at 1pm, so current regimen is Sinemet 25/100 1 tab at (9am, 5pm and 9pm) and 1 5 tabs at 1pm  Artane 2mg tid (9am, 3pm, 9pm)  Clonazepam 0 5mg 1/2 tab qhs  - other options in future, can move artane at 2pm to see if improvement of possible wearing off effect at 3pm that is typical for her  - would recommend doing online yoga to help w/ destressing and anxiety to help decrease tremor   - 6 month f/u w/ Dr Edwar Doshi

## 2023-05-24 NOTE — ASSESSMENT & PLAN NOTE
Anxiety   doing well on paxil and cymbalta  Increased due to family stressors  We discussed potential options to reduce stress  If interested in referral for psychotherapy she will contact the office

## 2023-05-24 NOTE — PATIENT INSTRUCTIONS
-  increase sinemet dose to 1 5 tabs at 1pm, so current regimen is Sinemet 25/100 1 tab at (9am, 5pm and 9pm) and 1 5 tabs at 1pm  Artane 2mg tid (9am, 3pm, 9pm)  Clonazepam 0 5mg 1/2 tab qhs  - other options in future, can move artane at 2pm to see if improvement of possible wearing off effect at 3pm that is typical for her  - would recommend doing online yoga to help w/ destressing and anxiety to help decrease tremor   - 6 month f/u w/ Dr Kevin Queen
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
## 2023-08-01 ENCOUNTER — OFFICE VISIT (OUTPATIENT)
Dept: OBGYN CLINIC | Facility: CLINIC | Age: 63
End: 2023-08-01
Payer: COMMERCIAL

## 2023-08-01 VITALS
SYSTOLIC BLOOD PRESSURE: 122 MMHG | WEIGHT: 158 LBS | BODY MASS INDEX: 25.39 KG/M2 | HEIGHT: 66 IN | DIASTOLIC BLOOD PRESSURE: 80 MMHG

## 2023-08-01 DIAGNOSIS — M17.11 PRIMARY OSTEOARTHRITIS OF RIGHT KNEE: Primary | ICD-10-CM

## 2023-08-01 DIAGNOSIS — M25.461 EFFUSION OF RIGHT KNEE: ICD-10-CM

## 2023-08-01 PROCEDURE — 99213 OFFICE O/P EST LOW 20 MIN: CPT | Performed by: ORTHOPAEDIC SURGERY

## 2023-08-01 NOTE — PROGRESS NOTES
Assessment:     1. Primary osteoarthritis of right knee    2. Effusion of right knee        Plan:     Problem List Items Addressed This Visit        Musculoskeletal and Integument    Primary osteoarthritis of right knee - Primary    Relevant Orders    Injection Procedure Prior Authorization    Brace   Other Visit Diagnoses     Effusion of right knee            Findings consistent with moderate to severe medial arthritis of right knee, recurrent effusion. Patient did get 2 months of relief from cortisone injection but is interested in trying gel injections to see if they offer longer term relief. Pain 2/10 today, worse 8/10. Patient was given hinged knee brace for support today to wear with activity. Referral for gel injections placed today. Stationary bike, elliptical for low impact exercise. Recommend patient to use over-the-counter NSAIDs, Aspercreme or Voltaren gel for pain control. See patient back once approved for visco to start series. All patient's questions were answered to her satisfaction. This note is created using dictation transcription. It may contain typographical errors, grammatical errors, improperly dictated words, background noise and other errors. Subjective:     Patient ID: Marshall Novak is a 59 y.o. female. Chief Complaint:  59 yr old female in for follow up regarding right knee. Treating for known medial meniscal tear with arthritis. She had knee aspirated and injected with cortisone in April. Discussed arthroscopy would not provide significant relief due to amount of arthritis in knee. She states cortisone injection was beneficial for 2 months. Pain went from 8 down to a 2. Since then pain has gradually returned medial aspect of knee. Prolonged walking, driving, stairs aggravate her knee. She has to use pillow between legs while sleeping secondary to pain. Swelling has also returned in her knee. Pain general ache, throbbing, can be sharp and severe at times. She is very active. Denies any cata instability. OTC nsaids as needed for pain. Allergy:  No Known Allergies  Medications:  all current active meds have been reviewed  Past Medical History:  Past Medical History:   Diagnosis Date   • Anxiety    • Breast cancer ([address])     bilateral   • Depression    • Parkinson disease ([address])    • Squamous cell skin cancer 11/23/2020    SCCIS right clavicle     Past Surgical History:  Past Surgical History:   Procedure Laterality Date   • BREAST RECONSTRUCTION Bilateral    • GANGLION CYST EXCISION Left     leg   • MASTECTOMY Bilateral    • MOHS SURGERY  01/06/2021    SCCIS right clavicle, Dr. Jorje Hammonds    • SKIN BIOPSY     • TONSILLECTOMY       Family History:  Family History   Problem Relation Age of Onset   • Stroke Mother    • Basal cell carcinoma Mother    • Cancer Father    • Stroke Brother      Social History:  Social History     Substance and Sexual Activity   Alcohol Use Yes    Comment: rare     Social History     Substance and Sexual Activity   Drug Use Never     Social History     Tobacco Use   Smoking Status Never   Smokeless Tobacco Never     Review of Systems   Constitutional: Negative for chills and fever. HENT: Negative for ear pain and sore throat. Eyes: Negative for pain and visual disturbance. Respiratory: Negative for cough and shortness of breath. Cardiovascular: Negative for chest pain and palpitations. Gastrointestinal: Negative for abdominal pain and vomiting. Genitourinary: Negative for dysuria and hematuria. Musculoskeletal: Positive for arthralgias (right knee). Negative for back pain. Skin: Negative for color change and rash. Neurological: Negative for seizures and syncope. All other systems reviewed and are negative.         Objective:  BP Readings from Last 1 Encounters:   08/01/23 122/80      Wt Readings from Last 1 Encounters:   08/01/23 71.7 kg (158 lb)      BMI:   Estimated body mass index is 25.5 kg/m² as calculated from the following:    Height as of this encounter: 5' 6" (1.676 m). Weight as of this encounter: 71.7 kg (158 lb). BSA:   Estimated body surface area is 1.81 meters squared as calculated from the following:    Height as of this encounter: 5' 6" (1.676 m). Weight as of this encounter: 71.7 kg (158 lb). Physical Exam  Musculoskeletal:         General: Tenderness (right knee arthralgia ) present. Right knee: Effusion (grade 1) present. Right Knee Exam     Muscle Strength   The patient has normal right knee strength. Tenderness   The patient is experiencing tenderness in the medial joint line and pes anserinus.     Range of Motion   Extension: normal   Flexion: normal     Tests   Varus: negative Valgus: negative    Other   Erythema: absent  Scars: absent  Sensation: normal  Pulse: present  Swelling: mild  Effusion: effusion (grade 1) present    Comments:  Crepitation with motion of patella             no new imaging to review      Scribe Attestation    I,:  Maria Cdaniel Silvayovani am acting as a scribe while in the presence of the attending physician.:       I,:  Tiana Garza MD personally performed the services described in this documentation    as scribed in my presence.:

## 2023-08-04 DIAGNOSIS — F41.9 ANXIETY: ICD-10-CM

## 2023-08-04 RX ORDER — PAROXETINE HYDROCHLORIDE 20 MG/1
TABLET, FILM COATED ORAL
Qty: 90 TABLET | Refills: 0 | Status: SHIPPED | OUTPATIENT
Start: 2023-08-04

## 2023-08-07 ENCOUNTER — OFFICE VISIT (OUTPATIENT)
Dept: DERMATOLOGY | Facility: CLINIC | Age: 63
End: 2023-08-07
Payer: COMMERCIAL

## 2023-08-07 VITALS — WEIGHT: 158.4 LBS | HEIGHT: 66 IN | BODY MASS INDEX: 25.46 KG/M2

## 2023-08-07 DIAGNOSIS — D22.62 MULTIPLE BENIGN MELANOCYTIC NEVI OF BOTH UPPER EXTREMITIES, BOTH LOWER EXTREMITIES, AND TRUNK: ICD-10-CM

## 2023-08-07 DIAGNOSIS — F41.9 ANXIETY: ICD-10-CM

## 2023-08-07 DIAGNOSIS — D22.5 MULTIPLE BENIGN MELANOCYTIC NEVI OF BOTH UPPER EXTREMITIES, BOTH LOWER EXTREMITIES, AND TRUNK: ICD-10-CM

## 2023-08-07 DIAGNOSIS — L81.4 LENTIGO: ICD-10-CM

## 2023-08-07 DIAGNOSIS — D18.01 CHERRY ANGIOMA: ICD-10-CM

## 2023-08-07 DIAGNOSIS — L82.1 SEBORRHEIC KERATOSIS: ICD-10-CM

## 2023-08-07 DIAGNOSIS — D22.71 MULTIPLE BENIGN MELANOCYTIC NEVI OF BOTH UPPER EXTREMITIES, BOTH LOWER EXTREMITIES, AND TRUNK: ICD-10-CM

## 2023-08-07 DIAGNOSIS — L57.0 KERATOSIS, ACTINIC: ICD-10-CM

## 2023-08-07 DIAGNOSIS — D22.61 MULTIPLE BENIGN MELANOCYTIC NEVI OF BOTH UPPER EXTREMITIES, BOTH LOWER EXTREMITIES, AND TRUNK: ICD-10-CM

## 2023-08-07 DIAGNOSIS — D22.72 MULTIPLE BENIGN MELANOCYTIC NEVI OF BOTH UPPER EXTREMITIES, BOTH LOWER EXTREMITIES, AND TRUNK: ICD-10-CM

## 2023-08-07 DIAGNOSIS — Z85.89 HISTORY OF SQUAMOUS CELL CARCINOMA: Primary | ICD-10-CM

## 2023-08-07 PROCEDURE — 17000 DESTRUCT PREMALG LESION: CPT | Performed by: DERMATOLOGY

## 2023-08-07 PROCEDURE — 17003 DESTRUCT PREMALG LES 2-14: CPT | Performed by: DERMATOLOGY

## 2023-08-07 PROCEDURE — 99214 OFFICE O/P EST MOD 30 MIN: CPT | Performed by: DERMATOLOGY

## 2023-08-07 RX ORDER — IBUPROFEN 600 MG/1
TABLET ORAL
COMMUNITY
Start: 2023-05-09

## 2023-08-07 RX ORDER — TRAMADOL HYDROCHLORIDE 50 MG/1
TABLET ORAL
COMMUNITY
Start: 2023-05-10 | End: 2023-08-08 | Stop reason: ALTCHOICE

## 2023-08-07 NOTE — PROGRESS NOTES
[facility] Note     Patient Name: Hale Ormond  Encounter Date: 08/07/2023     Have you been cared for by a [facility] in the last 3 years and, if so, which description applies to you? Yes. I have been here within the last 3 years, and my medical history has NOT changed since that time. I am FEMALE/of child-bearing potential.    REVIEW OF SYSTEMS:  Have you recently had or currently have any of the following? · No changes in my recent health. PAST MEDICAL HISTORY:  Have you personally ever had or currently have any of the following? If "YES," then please provide more detail. · No changes in my medical history. FAMILY HISTORY:  Any "first degree relatives" (parent, brother, sister, or child) with the following? • No changes in my family's known health. PATIENT EXPERIENCE:    • Do you want the Dermatologist to perform a COMPLETE skin exam today including a clinical examination under the "bra and underwear" areas? Yes  • If necessary, do we have your permission to call and leave a detailed message on your Preferred Phone number that includes your specific medical information? Yes      No Known Allergies   Current Outpatient Medications:   •  Ascorbic Acid (VITAMIN C) 100 MG tablet, Take 1,000 mg by mouth daily , Disp: , Rfl:   •  b complex vitamins capsule, Take 1 capsule by mouth daily, Disp: , Rfl:   •  carbidopa-levodopa (SINEMET)  mg per tablet, take 1 tab daily (9am, 5pm and 9pm) and 1.5 tab at 1pm daily, Disp: 360 tablet, Rfl: 5  •  clonazePAM (KlonoPIN) 0.5 MG disintegrating tablet, Take 1 tablet (0.5 mg total) by mouth daily at bedtime (Patient taking differently: Take 0.5 mg by mouth if needed), Disp: 30 tablet, Rfl: 1  •  DULoxetine (CYMBALTA) 60 mg delayed release capsule, Take 1 capsule (60 mg total) by mouth daily, Disp: 90 capsule, Rfl: 3  •  fluorouracil (EFUDEX) 5 % cream, Apply to affected areas of face twice daily for 2 weeks. , Disp: 40 g, Rfl: 0  •  MAGNESIUM PO, Take by mouth, Disp: , Rfl:   •  meloxicam (MOBIC) 7.5 mg tablet, Take 7.5 mg by mouth (Patient not taking: Reported on 5/24/2023), Disp: , Rfl:   •  Multiple Vitamins-Minerals (MULTIVITAMIN WITH MINERALS) tablet, Take 1 tablet by mouth daily, Disp: , Rfl:   •  Multiple Vitamins-Minerals (OCUVITE ADULT 50+ PO), Take by mouth, Disp: , Rfl:   •  PARoxetine (PAXIL) 20 mg tablet, Take 1 tablet by mouth once daily, Disp: 90 tablet, Rfl: 0  •  tiZANidine (ZANAFLEX) 2 mg tablet, Take 2 mg by mouth (Patient not taking: Reported on 9/29/2022), Disp: , Rfl:   •  trihexyphenidyl (ARTANE) 2 mg tablet, Take 1 tablet (2 mg total) by mouth 3 (three) times a day with meals (Patient taking differently: Take 2 mg by mouth 4 (four) times a day (with meals and at bedtime)), Disp: 270 tablet, Rfl: 3  •  Zoster Vac Recomb Adjuvanted (SHINGRIX) 50 MCG/0.5ML SUSR, 0.5mL IM for one dose, followed by 0.5mL IM 2-6 months after first dose (Patient not taking: Reported on 2/20/2023), Disp: 1 each, Rfl: 1          • Whom besides the patient is providing clinical information about today's encounter?   o NO ADDITIONAL HISTORIAN (patient alone provided history)    Physical Exam and Assessment/Plan by Diagnosis:    HISTORY OF SQUAMOUS CELL CARCINOMA     Physical Exam:  • Anatomic Location Affected:  Right clavicle  • Morphological Description of Scar:  Well healed scar  • Suspected Recurrence: no  • Regional adenopathy: no    Additional History of Present Condition:  Excision performed 12/14/2020. Previous Accession # [id number]. Assessment and Plan:  Based on a thorough discussion of this condition and the management approach to it (including a comprehensive discussion of the known risks, side effects and potential benefits of treatment), the patient (family) agrees to implement the following specific plan:  • Continue with yearly skin exams. • Monitor for any changes.   • Use a moisturizer + sunscreen "combo" product such as Neutrogena Daily Defense SPF 50+ or CeraVe AM at least three times a day. How can SCC be prevented? The most important way to prevent SCC is to avoid sunburn. This is especially important in childhood and early life. Fair skinned individuals and those with a personal or family history of BCC should protect their skin from sun exposure daily, year-round and lifelong. • Stay indoors or under the shade in the middle of the day   • Wear covering clothing   • Apply high protection factor SPF50+ broad-spectrum sunscreens generously to exposed skin if outdoors   • Avoid indoor tanning (sun beds, solaria)      What is the outlook for SCC? Most SCC are cured by treatment. Cure is most likely if treatment is undertaken when the lesion is small. A small percent of SCC's can spread to lymph  nodes and long term monitoring is indicated. They are also at increased risk of other skin cancers, especially melanoma. Regular self-skin examinations and long-term annual skin checks by an experienced health professional are recommended. SEBORRHEIC KERATOSES  - Relevant exam: Scattered over the trunk/extremities are waxy brown to black plaques and papules with stuck on appearance and consistent dermoscopy  - Exam and clinical history consistent with seborrheic keratoses  - Counseled that these are benign growths that do not require treatment  - Counseled that removal of lesions is considered cosmetic and so would incur a fee should patient elect to move forward. MELANOCYTIC NEVI  -Relevant exam: Scattered over the trunk/extremities are homogenously pigmented brown macules and papules. ELM performed and without concerning findings.  No outliers unless otherwise noted in today's note  - Exam and clinical history consistent with melanocytic nevi  - Educated on the ABCDE's of melanoma; handout provided  - Counseled to return to clinic prior to scheduled appointment should any of these lesions change or should any new lesions of concern arise  - Counseled on use of sun protection daily. Reviewed latest FDA sunscreen guidelines, including use of broad spectrum (UVA and UVB blocking) sunscreen or sun protective clothing with SPF 30-50 every 2-3 hours and reapplied after exposure to water; use of photoprotective clothing, including a broad brim hat and UPF rated clothing if outdoors for several hours; avoid use of tanning beds as these pose significant risk for melanoma and skin cancer. LENTIGINES  OTHER SKIN CHANGES DUE TO CHRONIC EXPOSURE TO NONIONIZING RADIATION  - Relevant exam: Over sun exposed areas are brown macules. ELM performed and without concerning findings. - Exam and clinical history consistent with lentigines. - Educated that these are indicative of prior sun exposure. - Counseled to return to clinic prior to scheduled appointment should any of these lesions change or should any new lesions of concern arise.  - Recommended use of sunscreen as above and below. - Counseled on use of sun protection daily. Reviewed latest FDA sunscreen guidelines, including use of broad spectrum (UVA and UVB blocking) sunscreen or sun protective clothing with SPF 30-50 every 2-3 hours and reapplied after exposure to water; use of photoprotective clothing, including a broad brim hat and UPF rated clothing if outdoors for several hours; avoid use of tanning beds as these pose significant risk for melanoma and skin cancer. CHERRY ANGIOMAS  - Relevant exam: Scattered over the trunk/extremities are red papules  - Exam and clinical history consistent with cherry angiomas  - Educated that these are benign  - Educated that removal is considered aesthetic and would incur a fee. ACTINIC KERATOSES    Patient was prescribed Efudex 5% cream 4/10/2023.  Patient used twice daily for 2 weeks, but reports lesions are still present.   - Relevant exam: On the forehead, right upper cutaneous lip and right temple are scaly pink macules without palpable dermal component    - Exam and clinical history consistent with actinic keratoses  - Discussed that these lesions are considered premalignant with the potential to evolve into squamous cell carcinoma. - Discussed that these are due to chronic sun exposure and therefore recommend use of sunscreen/sun protection to prevent further sun damage  - Discussed treatment options including risks, benefits and offered biopsy to confirm of previously treated lesions but she prefers trial of cryotherapy first and commits to follow up if lesions fail to resolve in 4 weeks for biopsy  - Patient counseled to return to the office in 4-6 weeks after completion of treatment for recheck if not resolved at which time retreatment or biopsy to rule out SCC will be determined based on clinic findings      PROCEDURE:  DESTRUCTION OF PRE-MALIGNANT LESIONS    - After a thorough discussion of treatment options and risk/benefits/alternatives (including but not limited to local pain, scarring, dyspigmentation, blistering, and possible superinfection), verbal and written consent were obtained and the aforementioned lesions were treated on with cryotherapy using liquid nitrogen x 1 cycle for 5-10 seconds. • TOTAL NUMBER of 3 pre-malignant lesions were treated today on the ANATOMIC LOCATION: Forehead, right temple, right upper lip. The patient tolerated the procedure well, and after-care instructions were provided.           Scribe Attestation    I,:  Antonia Vivar am acting as a scribe while in the presence of the attending physician.:       I,:  Miracle Fuentes MD personally performed the services described in this documentation    as scribed in my presence.:

## 2023-08-07 NOTE — PATIENT INSTRUCTIONS
HISTORY OF SQUAMOUS CELL CARCINOMA     Assessment and Plan:  Based on a thorough discussion of this condition and the management approach to it (including a comprehensive discussion of the known risks, side effects and potential benefits of treatment), the patient (family) agrees to implement the following specific plan:  Continue with yearly skin exams. Monitor for any changes. Use a moisturizer + sunscreen "combo" product such as Neutrogena Daily Defense SPF 50+ or CeraVe AM at least three times a day. How can SCC be prevented? The most important way to prevent SCC is to avoid sunburn. This is especially important in childhood and early life. Fair skinned individuals and those with a personal or family history of BCC should protect their skin from sun exposure daily, year-round and lifelong. Stay indoors or under the shade in the middle of the day   Wear covering clothing   Apply high protection factor SPF50+ broad-spectrum sunscreens generously to exposed skin if outdoors   Avoid indoor tanning (sun beds, solaria)      What is the outlook for SCC? Most SCC are cured by treatment. Cure is most likely if treatment is undertaken when the lesion is small. A small percent of SCC's can spread to lymph  nodes and long term monitoring is indicated. They are also at increased risk of other skin cancers, especially melanoma. Regular self-skin examinations and long-term annual skin checks by an experienced health professional are recommended. SEBORRHEIC KERATOSES  - Relevant exam: Scattered over the trunk/extremities are waxy brown to black plaques and papules with stuck on appearance and consistent dermoscopy  - Exam and clinical history consistent with seborrheic keratoses  - Counseled that these are benign growths that do not require treatment  - Counseled that removal of lesions is considered cosmetic and so would incur a fee should patient elect to move forward.        MELANOCYTIC NEVI  -Relevant exam: Scattered over the trunk/extremities are homogenously pigmented brown macules and papules. ELM performed and without concerning findings. No outliers unless otherwise noted in today's note  - Exam and clinical history consistent with melanocytic nevi  - Educated on the ABCDE's of melanoma; handout provided  - Counseled to return to clinic prior to scheduled appointment should any of these lesions change or should any new lesions of concern arise  - Counseled on use of sun protection daily. Reviewed latest FDA sunscreen guidelines, including use of broad spectrum (UVA and UVB blocking) sunscreen or sun protective clothing with SPF 30-50 every 2-3 hours and reapplied after exposure to water; use of photoprotective clothing, including a broad brim hat and UPF rated clothing if outdoors for several hours; avoid use of tanning beds as these pose significant risk for melanoma and skin cancer. LENTIGINES  OTHER SKIN CHANGES DUE TO CHRONIC EXPOSURE TO NONIONIZING RADIATION  - Relevant exam: Over sun exposed areas are brown macules. ELM performed and without concerning findings. - Exam and clinical history consistent with lentigines. - Educated that these are indicative of prior sun exposure. - Counseled to return to clinic prior to scheduled appointment should any of these lesions change or should any new lesions of concern arise.  - Recommended use of sunscreen as above and below. - Counseled on use of sun protection daily. Reviewed latest FDA sunscreen guidelines, including use of broad spectrum (UVA and UVB blocking) sunscreen or sun protective clothing with SPF 30-50 every 2-3 hours and reapplied after exposure to water; use of photoprotective clothing, including a broad brim hat and UPF rated clothing if outdoors for several hours; avoid use of tanning beds as these pose significant risk for melanoma and skin cancer.     CHERRY ANGIOMAS  - Relevant exam: Scattered over the trunk/extremities are red papules  - Exam and clinical history consistent with cherry angiomas  - Educated that these are benign  - Educated that removal is considered aesthetic and would incur a fee. ACTINIC KERATOSES    Patient was prescribed Efudex 5% cream 4/10/2023. Patient used twice daily for 2 weeks, but reports lesions are still present.   - Relevant exam: On the forehead and next to right eye are scaly pink macules without palpable dermal component    - Exam and clinical history consistent with actinic keratoses  - Discussed that these lesions are considered premalignant with the potential to evolve into squamous cell carcinoma. - Discussed that these are due to chronic sun exposure and therefore recommend use of sunscreen/sun protection to prevent further sun damage  - Discussed treatment options including risks, benefits  - Patient counseled to return to the office in 4-6 weeks after completion of treatment for recheck if not resolved at which time retreatment or biopsy to rule out SCC will be determined based on clinic findings    - The patient agreed to treatment with topical efudex 5% for **7 days BID to the face; 14 days BID to the extremities/trunk; 21 days BID to the scalp**   - Common side effects for this treatment were discussed     OPTION 3:    PROCEDURE:  DESTRUCTION OF PRE-MALIGNANT LESIONS    - After a thorough discussion of treatment options and risk/benefits/alternatives (including but not limited to local pain, scarring, dyspigmentation, blistering, and possible superinfection), verbal and written consent were obtained and the aforementioned lesions were treated on with cryotherapy using liquid nitrogen x 1 cycle for 5-10 seconds. Liquid nitrogen was applied for 10-12 seconds to the skin lesion and the expected blistering or scabbing reaction explained. Do not pick at the area. Patient reminded to expect hypopigmented scars from the procedure. Return if lesion fails to fully resolve.

## 2023-08-08 RX ORDER — CLONAZEPAM 0.5 MG/1
0.5 TABLET, ORALLY DISINTEGRATING ORAL
Qty: 30 TABLET | Refills: 1 | Status: SHIPPED | OUTPATIENT
Start: 2023-08-08

## 2023-08-16 DIAGNOSIS — G20 PARKINSON DISEASE (HCC): ICD-10-CM

## 2023-08-16 NOTE — TELEPHONE ENCOUNTER
Patient left voicemail regarding the same. Call returned to patient. Acknowledged voicemail received and will be forwarded to provider.

## 2023-08-16 NOTE — TELEPHONE ENCOUNTER
Recd vm 8/15 taken off   my name's mervin persaud. I am requesting carbidopa/levodopa. I  had a 90 day supply, and supposedly it was cancelled. I don't know why. So I have 2 days left, which isn't a lot because I take it 4 times a day.  So it needs to go to the [address] [place] [address] in  [place], that's on [facility], phone number would be [phone]      Last visit  Dr. Gracia Apgar  Please sign script to redirect to [address] [place] [address] if in agreement, thank you.  [facility] [address] [place] [address] [facility]

## 2023-08-17 NOTE — TELEPHONE ENCOUNTER
MAKAYLA 8/16/23 at 16:17, jeffrey 8/17/23 at 07:51 am:    Patient's Carbidopa Levodopa was sent to the right pharmacy but the pharmacy is out of the medication and now she has none.    # [phone]

## 2023-08-29 ENCOUNTER — PROCEDURE VISIT (OUTPATIENT)
Dept: OBGYN CLINIC | Facility: CLINIC | Age: 63
End: 2023-08-29
Payer: COMMERCIAL

## 2023-08-29 VITALS
WEIGHT: 155 LBS | BODY MASS INDEX: 24.91 KG/M2 | DIASTOLIC BLOOD PRESSURE: 78 MMHG | SYSTOLIC BLOOD PRESSURE: 122 MMHG | HEIGHT: 66 IN

## 2023-08-29 DIAGNOSIS — M17.11 PRIMARY OSTEOARTHRITIS OF RIGHT KNEE: Primary | ICD-10-CM

## 2023-08-29 PROCEDURE — 20610 DRAIN/INJ JOINT/BURSA W/O US: CPT | Performed by: ORTHOPAEDIC SURGERY

## 2023-08-29 NOTE — ASSESSMENT & PLAN NOTE
Findings consistent with right knee osteoarthritis. Findings and treatment options were discussed with the patient. The first of 3 right knee Orthovisc injections were given today. She tolerated the procedure well. Advised to apply cold compress today. Follow-up in 1 week for the second injection. All questions were answered to patient's satisfaction.

## 2023-08-29 NOTE — PROGRESS NOTES
Assessment:     1. Primary osteoarthritis of right knee        Plan:     Problem List Items Addressed This Visit        Musculoskeletal and Integument    Primary osteoarthritis of right knee - Primary     Findings consistent with right knee osteoarthritis. Findings and treatment options were discussed with the patient. The first of 3 right knee Orthovisc injections were given today. She tolerated the procedure well. Advised to apply cold compress today. Follow-up in 1 week for the second injection. All questions were answered to patient's satisfaction. Relevant Medications    sodium hyaluronate (ORTHOVISC) injection SOSY 30 mg (Completed)    Other Relevant Orders    Large joint arthrocentesis: R knee (Completed)            Subjective:     Patient ID: Augustus Parra is a 59 y.o. female. Chief Complaint: This is a 79-year-old white female following up for right knee osteoarthritis. She is here to begin a series of Orthovisc injections. She continues to have aching pain in her knee localized over the medial aspect. She denies any new injury. She has been taking Motrin as needed.     Allergy:  No Known Allergies  Medications:  all current active meds have been reviewed  Past Medical History:  Past Medical History:   Diagnosis Date   • Anxiety    • Breast cancer ([address])     bilateral   • Depression    • Parkinson disease ([address])    • Squamous cell skin cancer 11/23/2020    SCCIS right clavicle     Past Surgical History:  Past Surgical History:   Procedure Laterality Date   • BREAST RECONSTRUCTION Bilateral    • GANGLION CYST EXCISION Left     leg   • MASTECTOMY Bilateral    • MOHS SURGERY  01/06/2021    SCCIS right clavicle, Dr. Mariaa Reddy    • SKIN BIOPSY     • TONSILLECTOMY       Family History:  Family History   Problem Relation Age of Onset   • Stroke Mother    • Basal cell carcinoma Mother    • Cancer Father    • Stroke Brother      Social History:  Social History     Substance and Sexual Activity Alcohol Use Yes    Comment: rare     Social History     Substance and Sexual Activity   Drug Use Never     Social History     Tobacco Use   Smoking Status Never   Smokeless Tobacco Never     Review of Systems   Constitutional: Negative for chills and fever. HENT: Negative for ear pain and sore throat. Eyes: Negative for pain and visual disturbance. Respiratory: Negative for cough and shortness of breath. Cardiovascular: Negative for chest pain and palpitations. Gastrointestinal: Negative for abdominal pain and vomiting. Genitourinary: Negative for dysuria and hematuria. Musculoskeletal: Positive for arthralgias (right knee). Negative for back pain. Skin: Negative for color change and rash. Neurological: Negative for seizures and syncope. All other systems reviewed and are negative. Objective:  BP Readings from Last 1 Encounters:   08/29/23 122/78      Wt Readings from Last 1 Encounters:   08/29/23 70.3 kg (155 lb)      BMI:   Estimated body mass index is 25.02 kg/m² as calculated from the following:    Height as of this encounter: 5' 6" (1.676 m). Weight as of this encounter: 70.3 kg (155 lb). BSA:   Estimated body surface area is 1.79 meters squared as calculated from the following:    Height as of this encounter: 5' 6" (1.676 m). Weight as of this encounter: 70.3 kg (155 lb). Physical Exam  Constitutional:       General: She is not in acute distress. Appearance: She is well-developed. HENT:      Head: Normocephalic. Eyes:      Conjunctiva/sclera: Conjunctivae normal.      Pupils: Pupils are equal, round, and reactive to light. Pulmonary:      Effort: Pulmonary effort is normal. No respiratory distress. Musculoskeletal:      Right knee: No effusion. Skin:     General: Skin is warm and dry. Neurological:      Mental Status: She is alert and oriented to person, place, and time.    Psychiatric:         Behavior: Behavior normal.       Right Knee Exam     Muscle Strength   The patient has normal right knee strength. Tenderness   The patient is experiencing tenderness in the medial joint line. Range of Motion   Extension: normal   Flexion: normal     Tests   Varus: negative Valgus: negative    Other   Erythema: absent  Scars: absent  Sensation: normal  Pulse: present  Swelling: none  Effusion: no effusion present    Comments:  Crepitation with motion of patella             no new imaging to review      Large joint arthrocentesis: R knee  Universal Protocol:  Consent: Verbal consent obtained.   Risks and benefits: risks, benefits and alternatives were discussed  Consent given by: patient  Patient understanding: patient states understanding of the procedure being performed    Supporting Documentation  Indications: pain   Procedure Details  Location: knee - R knee  Preparation: Patient was prepped and draped in the usual sterile fashion  Needle size: 22 G  Approach: anterolateral  Medications administered: 30 mg sodium hyaluronate 30 mg/2 mL    Patient tolerance: patient tolerated the procedure well with no immediate complications  Dressing:  Sterile dressing applied

## 2023-09-05 ENCOUNTER — PROCEDURE VISIT (OUTPATIENT)
Dept: OBGYN CLINIC | Facility: CLINIC | Age: 63
End: 2023-09-05
Payer: COMMERCIAL

## 2023-09-05 VITALS
SYSTOLIC BLOOD PRESSURE: 120 MMHG | HEIGHT: 67 IN | BODY MASS INDEX: 24.33 KG/M2 | DIASTOLIC BLOOD PRESSURE: 80 MMHG | WEIGHT: 155 LBS

## 2023-09-05 DIAGNOSIS — M17.11 PRIMARY OSTEOARTHRITIS OF RIGHT KNEE: Primary | ICD-10-CM

## 2023-09-05 PROCEDURE — 20610 DRAIN/INJ JOINT/BURSA W/O US: CPT | Performed by: ORTHOPAEDIC SURGERY

## 2023-09-05 NOTE — ASSESSMENT & PLAN NOTE
Findings consistent with right knee osteoarthritis. Findings and treatment options were discussed with the patient. The 2nd of 3 right knee Orthovisc injections were given today. She tolerated the procedure well. Advised to apply cold compress today. Follow-up in 1 week for the third injection. All questions were answered to patient's satisfaction.

## 2023-09-05 NOTE — PROGRESS NOTES
Assessment:     1. Primary osteoarthritis of right knee        Plan:     Problem List Items Addressed This Visit        Musculoskeletal and Integument    Primary osteoarthritis of right knee - Primary     Findings consistent with right knee osteoarthritis. Findings and treatment options were discussed with the patient. The 2nd of 3 right knee Orthovisc injections were given today. She tolerated the procedure well. Advised to apply cold compress today. Follow-up in 1 week for the third injection. All questions were answered to patient's satisfaction. Relevant Medications    sodium hyaluronate (ORTHOVISC) injection SOSY 30 mg (Completed)    Other Relevant Orders    Large joint arthrocentesis: R knee (Completed)            Subjective:     Patient ID: Washington Prado is a 59 y.o. female. Chief Complaint: This is a 78-year-old white female following up for right knee osteoarthritis. She is here for the second of 3 right knee Orthovisc injections. No issues after the first injection. No improvement yet.     Allergy:  No Known Allergies  Medications:  all current active meds have been reviewed  Past Medical History:  Past Medical History:   Diagnosis Date   • Anxiety    • Breast cancer ([address])     bilateral   • Depression    • Parkinson disease ([address])    • Squamous cell skin cancer 11/23/2020    SCCIS right clavicle     Past Surgical History:  Past Surgical History:   Procedure Laterality Date   • BREAST RECONSTRUCTION Bilateral    • GANGLION CYST EXCISION Left     leg   • MASTECTOMY Bilateral    • MOHS SURGERY  01/06/2021    SCCIS right clavicle, Dr. Kathy Oliveira    • SKIN BIOPSY     • TONSILLECTOMY       Family History:  Family History   Problem Relation Age of Onset   • Stroke Mother    • Basal cell carcinoma Mother    • Cancer Father    • Stroke Brother      Social History:  Social History     Substance and Sexual Activity   Alcohol Use Yes    Comment: rare     Social History     Substance and Sexual Activity Drug Use Never     Social History     Tobacco Use   Smoking Status Never   Smokeless Tobacco Never     Review of Systems   Constitutional: Negative for chills and fever. HENT: Negative for ear pain and sore throat. Eyes: Negative for pain and visual disturbance. Respiratory: Negative for cough and shortness of breath. Cardiovascular: Negative for chest pain and palpitations. Gastrointestinal: Negative for abdominal pain and vomiting. Genitourinary: Negative for dysuria and hematuria. Musculoskeletal: Positive for arthralgias (right knee). Negative for back pain. Skin: Negative for color change and rash. Neurological: Negative for seizures and syncope. All other systems reviewed and are negative. Objective:  BP Readings from Last 1 Encounters:   09/05/23 120/80      Wt Readings from Last 1 Encounters:   09/05/23 70.3 kg (155 lb)      BMI:   Estimated body mass index is 24.28 kg/m² as calculated from the following:    Height as of this encounter: 5' 7" (1.702 m). Weight as of this encounter: 70.3 kg (155 lb). BSA:   Estimated body surface area is 1.81 meters squared as calculated from the following:    Height as of this encounter: 5' 7" (1.702 m). Weight as of this encounter: 70.3 kg (155 lb). Physical Exam  Constitutional:       General: She is not in acute distress. Appearance: She is well-developed. HENT:      Head: Normocephalic. Eyes:      Conjunctiva/sclera: Conjunctivae normal.      Pupils: Pupils are equal, round, and reactive to light. Pulmonary:      Effort: Pulmonary effort is normal. No respiratory distress. Musculoskeletal:      Right knee: No effusion. Skin:     General: Skin is warm and dry. Neurological:      Mental Status: She is alert and oriented to person, place, and time. Psychiatric:         Behavior: Behavior normal.       Right Knee Exam     Muscle Strength   The patient has normal right knee strength.     Tenderness   The patient is experiencing tenderness in the medial joint line. Range of Motion   Extension: normal   Flexion: normal     Tests   Varus: negative Valgus: negative    Other   Erythema: absent  Scars: absent  Sensation: normal  Pulse: present  Swelling: none  Effusion: no effusion present    Comments:  Crepitation with motion of patella             no new imaging to review      Large joint arthrocentesis: R knee  Universal Protocol:  Consent: Verbal consent obtained.   Risks and benefits: risks, benefits and alternatives were discussed  Consent given by: patient  Patient understanding: patient states understanding of the procedure being performed    Supporting Documentation  Indications: pain   Procedure Details  Location: knee - R knee  Preparation: Patient was prepped and draped in the usual sterile fashion  Needle size: 22 G  Approach: anterolateral  Medications administered: 30 mg sodium hyaluronate 30 mg/2 mL    Patient tolerance: patient tolerated the procedure well with no immediate complications  Dressing:  Sterile dressing applied

## 2023-09-13 ENCOUNTER — PROCEDURE VISIT (OUTPATIENT)
Dept: OBGYN CLINIC | Facility: CLINIC | Age: 63
End: 2023-09-13
Payer: COMMERCIAL

## 2023-09-13 VITALS
DIASTOLIC BLOOD PRESSURE: 80 MMHG | BODY MASS INDEX: 24.8 KG/M2 | WEIGHT: 158 LBS | HEIGHT: 67 IN | SYSTOLIC BLOOD PRESSURE: 124 MMHG

## 2023-09-13 DIAGNOSIS — M17.11 PRIMARY OSTEOARTHRITIS OF RIGHT KNEE: Primary | ICD-10-CM

## 2023-09-13 PROCEDURE — 20610 DRAIN/INJ JOINT/BURSA W/O US: CPT | Performed by: ORTHOPAEDIC SURGERY

## 2023-09-13 NOTE — PROGRESS NOTES
Assessment:     1. Primary osteoarthritis of right knee        Plan:     Problem List Items Addressed This Visit        Musculoskeletal and Integument    Primary osteoarthritis of right knee - Primary     Findings consistent with right knee osteoarthritis. Findings and treatment options were discussed with the patient. The 3rd of 3 right knee Orthovisc injections were given today. She tolerated the procedure well. Advised to apply cold compress today. As needed if symptoms return. Advised patient the soonest she can have another series is in 6 months. All questions were answered to patient's satisfaction. Relevant Medications    sodium hyaluronate (ORTHOVISC) injection SOSY 30 mg (Completed)    Other Relevant Orders    Large joint arthrocentesis: R knee (Completed)            Subjective:     Patient ID: Mohan Galindo is a 59 y.o. female. Chief Complaint: This is a 71-year-old white female following up for right knee osteoarthritis. She is here for the 3rd of 3 right knee Orthovisc injections. No issues after the second injection. No improvement yet.     Allergy:  No Known Allergies  Medications:  all current active meds have been reviewed  Past Medical History:  Past Medical History:   Diagnosis Date   • Anxiety    • Breast cancer ([address])     bilateral   • Depression    • Parkinson disease ([address])    • Squamous cell skin cancer 11/23/2020    SCCIS right clavicle     Past Surgical History:  Past Surgical History:   Procedure Laterality Date   • BREAST RECONSTRUCTION Bilateral    • GANGLION CYST EXCISION Left     leg   • MASTECTOMY Bilateral    • MOHS SURGERY  01/06/2021    SCCIS right clavicle, Dr. Parminder Walker    • SKIN BIOPSY     • TONSILLECTOMY       Family History:  Family History   Problem Relation Age of Onset   • Stroke Mother    • Basal cell carcinoma Mother    • Cancer Father    • Stroke Brother      Social History:  Social History     Substance and Sexual Activity   Alcohol Use Yes    Comment: rare     Social History     Substance and Sexual Activity   Drug Use Never     Social History     Tobacco Use   Smoking Status Never   Smokeless Tobacco Never     Review of Systems   Constitutional: Negative for chills and fever. HENT: Negative for ear pain and sore throat. Eyes: Negative for pain and visual disturbance. Respiratory: Negative for cough and shortness of breath. Cardiovascular: Negative for chest pain and palpitations. Gastrointestinal: Negative for abdominal pain and vomiting. Genitourinary: Negative for dysuria and hematuria. Musculoskeletal: Positive for arthralgias (right knee). Negative for back pain. Skin: Negative for color change and rash. Neurological: Negative for seizures and syncope. All other systems reviewed and are negative. Objective:  BP Readings from Last 1 Encounters:   09/13/23 124/80      Wt Readings from Last 1 Encounters:   09/13/23 71.7 kg (158 lb)      BMI:   Estimated body mass index is 24.75 kg/m² as calculated from the following:    Height as of this encounter: 5' 7" (1.702 m). Weight as of this encounter: 71.7 kg (158 lb). BSA:   Estimated body surface area is 1.83 meters squared as calculated from the following:    Height as of this encounter: 5' 7" (1.702 m). Weight as of this encounter: 71.7 kg (158 lb). Physical Exam  Constitutional:       General: She is not in acute distress. Appearance: She is well-developed. HENT:      Head: Normocephalic. Eyes:      Conjunctiva/sclera: Conjunctivae normal.      Pupils: Pupils are equal, round, and reactive to light. Pulmonary:      Effort: Pulmonary effort is normal. No respiratory distress. Musculoskeletal:      Right knee: No effusion. Skin:     General: Skin is warm and dry. Neurological:      Mental Status: She is alert and oriented to person, place, and time.    Psychiatric:         Behavior: Behavior normal.       Right Knee Exam     Muscle Strength   The patient has normal right knee strength. Tenderness   The patient is experiencing tenderness in the medial joint line. Range of Motion   Extension: normal   Flexion: normal     Tests   Varus: negative Valgus: negative    Other   Erythema: absent  Scars: absent  Sensation: normal  Pulse: present  Swelling: none  Effusion: no effusion present    Comments:  Crepitation with motion of patella             no new imaging to review      Large joint arthrocentesis: R knee  Universal Protocol:  Consent: Verbal consent obtained.   Risks and benefits: risks, benefits and alternatives were discussed  Consent given by: patient  Patient understanding: patient states understanding of the procedure being performed    Supporting Documentation  Indications: pain   Procedure Details  Location: knee - R knee  Preparation: Patient was prepped and draped in the usual sterile fashion  Needle size: 22 G  Approach: anterolateral  Medications administered: 30 mg sodium hyaluronate 30 mg/2 mL    Patient tolerance: patient tolerated the procedure well with no immediate complications  Dressing:  Sterile dressing applied

## 2023-09-13 NOTE — ASSESSMENT & PLAN NOTE
Findings consistent with right knee osteoarthritis. Findings and treatment options were discussed with the patient. The 3rd of 3 right knee Orthovisc injections were given today. She tolerated the procedure well. Advised to apply cold compress today. As needed if symptoms return. Advised patient the soonest she can have another series is in 6 months. All questions were answered to patient's satisfaction.

## 2023-10-24 ENCOUNTER — OFFICE VISIT (OUTPATIENT)
Dept: OBGYN CLINIC | Facility: CLINIC | Age: 63
End: 2023-10-24
Payer: COMMERCIAL

## 2023-10-24 VITALS
WEIGHT: 158 LBS | HEIGHT: 67 IN | DIASTOLIC BLOOD PRESSURE: 82 MMHG | BODY MASS INDEX: 24.8 KG/M2 | SYSTOLIC BLOOD PRESSURE: 140 MMHG

## 2023-10-24 DIAGNOSIS — M17.11 PRIMARY OSTEOARTHRITIS OF RIGHT KNEE: Primary | ICD-10-CM

## 2023-10-24 PROCEDURE — 99214 OFFICE O/P EST MOD 30 MIN: CPT | Performed by: ORTHOPAEDIC SURGERY

## 2023-10-24 NOTE — PROGRESS NOTES
Assessment:     1. Primary osteoarthritis of right knee        Plan:     Problem List Items Addressed This Visit          Musculoskeletal and Integument    Primary osteoarthritis of right knee - Primary     Findings consistent with moderate to severe medial arthritis of right knee. Patient did finish orthovisc series 9/13/23 with 5 weeks of good relief up until recently. Discussed arthroscopy may not provide complete pain relief due to amount of arthritis in her knee. Discussed repeating cortisone injection today vs right total knee replacement. She does care for mother in law. Stationary bike, elliptical for low impact exercise. Recommend patient to use over-the-counter NSAIDs, Aspercreme or Voltaren gel for pain control. She wishes to take some time to discuss total knee replacement with her family. If she wishes to proceed with surgery she can call to make follow up appt. If she decides against surgery she can also do CSI. All patient's questions were answered to her satisfaction. This note is created using dictation transcription. It may contain typographical errors, grammatical errors, improperly dictated words, background noise and other errors. Subjective:     Patient ID: Maribel Lennon is a 59 y.o. female. Chief Complaint:  59 yr old female in for follow up regarding right knee. She was given orthovisc series 9/13/23 for arthritis of knee. She was doing well up until recently. She is not sure if she twisted or moved knee wrong but has been experiencing sharp stabbing pain inside of knee. She had knee aspirated and injected with cortisone in April with 2 months of relief. Prolonged walking, driving, stairs aggravate her knee. She has to use pillow between legs while sleeping secondary to pain. Pain general ache, throbbing, can be sharp and severe at times. She is very active. Denies any cata instability. OTC nsaids as needed for pain. Brace also helps for stability.      Allergy:  No Known Allergies  Medications:  all current active meds have been reviewed  Past Medical History:  Past Medical History:   Diagnosis Date    Anxiety     Breast cancer ([address])     bilateral    Depression     Parkinson disease     Squamous cell skin cancer 11/23/2020    SCCIS right clavicle     Past Surgical History:  Past Surgical History:   Procedure Laterality Date    BREAST RECONSTRUCTION Bilateral     GANGLION CYST EXCISION Left     leg    MASTECTOMY Bilateral     MOHS SURGERY  01/06/2021    SCCIS right clavicle, Dr. Tru Lozano       Family History:  Family History   Problem Relation Age of Onset    Stroke Mother     Basal cell carcinoma Mother     Cancer Father     Stroke Brother      Social History:  Social History     Substance and Sexual Activity   Alcohol Use Yes    Comment: rare     Social History     Substance and Sexual Activity   Drug Use Never     Social History     Tobacco Use   Smoking Status Never   Smokeless Tobacco Never     Review of Systems   Constitutional:  Negative for chills and fever. HENT:  Negative for ear pain and sore throat. Eyes:  Negative for pain and visual disturbance. Respiratory:  Negative for cough and shortness of breath. Cardiovascular:  Negative for chest pain and palpitations. Gastrointestinal:  Negative for abdominal pain and vomiting. Genitourinary:  Negative for dysuria and hematuria. Musculoskeletal:  Positive for arthralgias (right knee), gait problem (Antalgic) and joint swelling (Right knee). Negative for back pain. Skin:  Negative for color change and rash. Neurological:  Negative for seizures and syncope. All other systems reviewed and are negative.         Objective:  BP Readings from Last 1 Encounters:   10/24/23 140/82      Wt Readings from Last 1 Encounters:   10/24/23 71.7 kg (158 lb)      BMI:   Estimated body mass index is 24.75 kg/m² as calculated from the following:    Height as of this encounter: 5' 7" (1.702 m).    Weight as of this encounter: 71.7 kg (158 lb). BSA:   Estimated body surface area is 1.83 meters squared as calculated from the following:    Height as of this encounter: 5' 7" (1.702 m). Weight as of this encounter: 71.7 kg (158 lb). Physical Exam  Vitals and nursing note reviewed. Constitutional:       Appearance: Normal appearance. She is well-developed. HENT:      Head: Normocephalic and atraumatic. Right Ear: External ear normal.      Left Ear: External ear normal.   Eyes:      Extraocular Movements: Extraocular movements intact. Conjunctiva/sclera: Conjunctivae normal.   Pulmonary:      Effort: Pulmonary effort is normal.   Musculoskeletal:         General: Tenderness (right knee arthralgia ) present. Cervical back: Neck supple. Right knee: Effusion (Trace) present. Skin:     General: Skin is warm and dry. Neurological:      Mental Status: She is alert and oriented to person, place, and time. Deep Tendon Reflexes: Reflexes are normal and symmetric. Psychiatric:         Mood and Affect: Mood normal.         Behavior: Behavior normal.       Right Knee Exam     Muscle Strength   The patient has normal right knee strength. Tenderness   The patient is experiencing tenderness in the medial joint line.     Range of Motion   Extension:  normal   Flexion:  normal     Tests   Varus: negative Valgus: negative  Patellar apprehension: negative    Other   Erythema: absent  Scars: absent  Sensation: normal  Pulse: present  Swelling: mild  Effusion: effusion (Trace) present    Comments:  Crepitation with motion of patella             no new imaging to review       Scribe Attestation      I,:  Delon Butcher am acting as a scribe while in the presence of the attending physician.:       I,:  Marta Lin MD personally performed the services described in this documentation    as scribed in my presence.:

## 2023-10-24 NOTE — PROGRESS NOTES
Assessment:   No diagnosis found. Plan:   {Assess/PlanSmartLinks:08334}   Subjective:     Patient ID: Chris Sylvester is a 59 y.o. female. Chief Complaint:  HPI  Allergy:  No Known Allergies  Medications:  {SL IP IAXB:991941505}  Past Medical History:  Past Medical History:   Diagnosis Date    Anxiety     Breast cancer ([address])     bilateral    Depression     Parkinson disease     Squamous cell skin cancer 11/23/2020    SCCIS right clavicle     Past Surgical History:  Past Surgical History:   Procedure Laterality Date    BREAST RECONSTRUCTION Bilateral     GANGLION CYST EXCISION Left     leg    MASTECTOMY Bilateral     MOHS SURGERY  01/06/2021    SCCIS right clavicle, Dr. Sydnie Hendrix       Family History:  Family History   Problem Relation Age of Onset    Stroke Mother     Basal cell carcinoma Mother     Cancer Father     Stroke Brother      Social History:  Social History     Substance and Sexual Activity   Alcohol Use Yes    Comment: rare     Social History     Substance and Sexual Activity   Drug Use Never     Social History     Tobacco Use   Smoking Status Never   Smokeless Tobacco Never     Review of Systems      Objective:  BP Readings from Last 1 Encounters:   10/24/23 140/82      Wt Readings from Last 1 Encounters:   10/24/23 71.7 kg (158 lb)      BMI:   Estimated body mass index is 24.75 kg/m² as calculated from the following:    Height as of this encounter: 5' 7" (1.702 m). Weight as of this encounter: 71.7 kg (158 lb). BSA:   Estimated body surface area is 1.83 meters squared as calculated from the following:    Height as of this encounter: 5' 7" (1.702 m). Weight as of this encounter: 71.7 kg (158 lb).    Physical Exam  Ortho Exam    {Imaging Review Statement:[id number]}       Scribe Attestation      I,:   am acting as a scribe while in the presence of the attending physician.:       I,:   personally performed the services described in this documentation    as scribed in my presence.:

## 2023-10-24 NOTE — ASSESSMENT & PLAN NOTE
Findings consistent with moderate to severe medial arthritis of right knee. Patient did finish orthovisc series 9/13/23 with 5 weeks of good relief up until recently. Discussed arthroscopy may not provide complete pain relief due to amount of arthritis in her knee. Discussed repeating cortisone injection today vs right total knee replacement. She does care for mother in law. Stationary bike, elliptical for low impact exercise. Recommend patient to use over-the-counter NSAIDs, Aspercreme or Voltaren gel for pain control. She wishes to take some time to discuss total knee replacement with her family. If she wishes to proceed with surgery she can call to make follow up appt. If she decides against surgery she can also do CSI. All patient's questions were answered to her satisfaction. This note is created using dictation transcription. It may contain typographical errors, grammatical errors, improperly dictated words, background noise and other errors.

## 2023-11-06 ENCOUNTER — TELEPHONE (OUTPATIENT)
Dept: FAMILY MEDICINE CLINIC | Facility: CLINIC | Age: 63
End: 2023-11-06

## 2023-11-06 NOTE — TELEPHONE ENCOUNTER
She is in a new relationship and he has Strep B-she is going to get tested at an Urgent care in [place]

## 2023-11-28 ENCOUNTER — TELEPHONE (OUTPATIENT)
Dept: FAMILY MEDICINE CLINIC | Facility: CLINIC | Age: 63
End: 2023-11-28

## 2023-11-28 NOTE — TELEPHONE ENCOUNTER
Barbra Peabody  called to say she is covid + and is interested in Paxlovid  She said she is  in [address] [place] [address] and I stated We cannot treat outside of the state. She will try to get Paxlovid from her urgent care.   Thank you

## 2023-12-04 DIAGNOSIS — F41.9 ANXIETY: ICD-10-CM

## 2023-12-04 DIAGNOSIS — G20.A1 PARKINSON DISEASE: ICD-10-CM

## 2023-12-04 RX ORDER — CLONAZEPAM 0.5 MG/1
0.5 TABLET, ORALLY DISINTEGRATING ORAL
Qty: 30 TABLET | Refills: 0 | Status: SHIPPED | OUTPATIENT
Start: 2023-12-04

## 2023-12-04 RX ORDER — TRIHEXYPHENIDYL HYDROCHLORIDE 2 MG/1
2 TABLET ORAL
Qty: 270 TABLET | Refills: 0 | Status: SHIPPED | OUTPATIENT
Start: 2023-12-04

## 2023-12-04 NOTE — TELEPHONE ENCOUNTER
received vm from  at 10:43am-Yes, hi. This is Kaya Amaya with the [facility] and that's [facility]. I am requesting 2 prescription. Um, one is the clonazepam which is dissolvable in my mouth at night. It is 0.5 milligrams. 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 And the other prescription is the trihexyphenidyl 2 milligrams tablets. Um, supposed to take it with the meal. Again, it's Kady Ontiveros, birth date would be 3/24 of 1960. Phone number is [phone].  Thank you.  --------------------------------------------------  Refills entered, please sign off

## 2024-01-03 ENCOUNTER — TELEPHONE (OUTPATIENT)
Dept: NEUROLOGY | Facility: CLINIC | Age: 64
End: 2024-01-03

## 2024-01-03 NOTE — TELEPHONE ENCOUNTER
Jaqui Garza called today to r/s missed appt. Offered next open slot on 7/24/24 at 130pm w/Almanzar, accepted. Location address was provided. Added to wait list.  
Negative

## 2024-01-23 DIAGNOSIS — F41.9 ANXIETY: ICD-10-CM

## 2024-01-23 RX ORDER — CLONAZEPAM 0.5 MG/1
0.5 TABLET, ORALLY DISINTEGRATING ORAL
Qty: 30 TABLET | Refills: 0 | Status: SHIPPED | OUTPATIENT
Start: 2024-01-23

## 2024-01-23 NOTE — TELEPHONE ENCOUNTER
1/22/24, 0843    Sorry, it got cut off. It's Jadyn Garza and birthday is [birth year]. I'm looking to refill my clonazepam 0.5 milligrams at [facility]. [phone]. Dr. Almanzar. Again, it's Jadyn Garza, [birth year]. Clonazepam 0.5 milligrams, [phone]. Thanks.

## 2024-01-23 NOTE — TELEPHONE ENCOUNTER
1/22 8:41    Pt left a VM requesting a refill of Klonopin.    Transcribed VM:   Hello. Yes, hi, this is Jadyn Garza. I am calling about my clonazepam 0.5 mg. And Dr. Almanzar is my doctor. I'm trying to read, it's [address] [facility], which is the pharmacy, which is [facility] (cut off after that).    Please sign off if agreeable. Thank you.

## 2024-01-25 ENCOUNTER — PATIENT OUTREACH (OUTPATIENT)
Dept: FAMILY MEDICINE CLINIC | Facility: CLINIC | Age: 64
End: 2024-01-25

## 2024-01-25 ENCOUNTER — OFFICE VISIT (OUTPATIENT)
Dept: FAMILY MEDICINE CLINIC | Facility: CLINIC | Age: 64
End: 2024-01-25
Payer: COMMERCIAL

## 2024-01-25 VITALS
WEIGHT: 153 LBS | HEART RATE: 96 BPM | OXYGEN SATURATION: 98 % | HEIGHT: 67 IN | RESPIRATION RATE: 20 BRPM | TEMPERATURE: 97.7 F | DIASTOLIC BLOOD PRESSURE: 80 MMHG | SYSTOLIC BLOOD PRESSURE: 124 MMHG | BODY MASS INDEX: 24.01 KG/M2

## 2024-01-25 DIAGNOSIS — F41.9 ANXIETY: ICD-10-CM

## 2024-01-25 DIAGNOSIS — F32.0 MAJOR DEPRESSIVE DISORDER, SINGLE EPISODE, MILD (HCC): ICD-10-CM

## 2024-01-25 DIAGNOSIS — G20.A1 PARKINSON'S DISEASE, UNSPECIFIED WHETHER DYSKINESIA PRESENT, UNSPECIFIED WHETHER MANIFESTATIONS FLUCTUATE: Primary | ICD-10-CM

## 2024-01-25 PROCEDURE — 99214 OFFICE O/P EST MOD 30 MIN: CPT | Performed by: FAMILY MEDICINE

## 2024-01-25 RX ORDER — PAROXETINE HYDROCHLORIDE 20 MG/1
20 TABLET, FILM COATED ORAL DAILY
Qty: 90 TABLET | Refills: 0 | Status: SHIPPED | OUTPATIENT
Start: 2024-01-25

## 2024-01-25 NOTE — PROGRESS NOTES
BERNA REAGAN received a referral from patient's PCP. BERNA REAGAN reviewed patient's chart and called patient ([phone]). Patient answered and BERNA REAGAN introduced BERNA REAGAN role and reason for calling.     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 Her MIL is currently established with a program called [employer] which does giver her a small break. BERNA REAGAN suggested she call her MIL's PCP office to see if they have a  to discuss waiver program or the aging department for an assessment of services she may qualify. Patient stated she will do so. Patient is independent with all ADLS/ IADLs. No financial issues at this time. No financial issues. Patient is experiencing depression and anxiety. No SI, HI, plan to harm herself or others. Patient is seeing a therapist through [facility]. PCP would like patient to establish with a psychiatrist as well.     BERNA REAGAN confirmed patient's insurance and e-mail ([email or web address]) and sent patient list of in network Psychiatrist. Patient stated she does not need BERNA REAGAN to follow up with her but she will call BERNA REAGAN for any needs. BERNA REAGAN will close. Please re consult BERNA REAGAN as needed.

## 2024-01-25 NOTE — ASSESSMENT & PLAN NOTE
Following with neurology who prescribes her medications  Encouraged her to follow up with them due to her numerous concerns today

## 2024-01-25 NOTE — PROGRESS NOTES
Jadyn Garza 1960 female MRN: [id number]    Family Medicine Acute Visit    ASSESSMENT/PLAN  Problem List Items Addressed This Visit          Nervous and Auditory    Parkinson disease - Primary     Following with neurology who prescribes her medications  Encouraged her to follow up with them due to her numerous concerns today            Other    Major depressive disorder, single episode, mild (HCC)     Depression Screening Follow-up Plan: Patient's depression screening was positive with a PHQ-2 score of . Their PHQ-9 score was 11. Continue regular follow-up with their psychologist/therapist/psychiatrist who is managing their mental health condition(s).    Continue to follow with her therapist as scheduled  Will increase her paxil to 20 mg daily  Close follow up in 4-6 weeks for medication check up         Relevant Medications    PARoxetine (PAXIL) 20 mg tablet    Other Relevant Orders    Ambulatory Referral to Social Work Care Management Program    Anxiety     Increased stress at home due to caring for mother in law and daughter who has a lot of mental health issues. Plan as above         Relevant Medications    PARoxetine (PAXIL) 20 mg tablet    Other Relevant Orders    Ambulatory Referral to Social Work Care Management Program             Future Appointments   Date Time Provider Department Center   7/24/2024  1:30 PM Amelia Aguilera MD [facility]          SUBJECTIVE  CC: Anxiety      HPI:  Jadyn Garza is a 63 y.o. female who presents for anxiety.  She is struggling with anxiety due to taking care of family members with have a lot going on.  She is seeing a therapist.     Review of Systems   Constitutional:  Negative for chills, fatigue and fever.   HENT:  Negative for congestion, postnasal drip, rhinorrhea and sinus pressure.    Eyes:  Negative for photophobia and visual disturbance.   Respiratory:  Negative for cough and shortness of breath.    Cardiovascular:  Negative for chest  pain, palpitations and leg swelling.   Gastrointestinal:  Negative for abdominal pain, constipation, diarrhea, nausea and vomiting.   Genitourinary:  Negative for difficulty urinating and dysuria.   Musculoskeletal:  Negative for arthralgias and myalgias.   Skin:  Negative for color change and rash.   Neurological:  Negative for dizziness, weakness, light-headedness and headaches.   Psychiatric/Behavioral:  The patient is nervous/anxious.        Historical Information   The patient history was reviewed as follows:  Past Medical History:   Diagnosis Date    Anxiety     Breast cancer (HCC)     bilateral    Depression     Parkinson disease     Squamous cell skin cancer 11/23/2020    SCCIS right clavicle         Past Surgical History:   Procedure Laterality Date    BREAST RECONSTRUCTION Bilateral     GANGLION CYST EXCISION Left     leg    MASTECTOMY Bilateral     MOHS SURGERY  01/06/2021    SCCIS right clavicle, Dr. Perla     SKIN BIOPSY      TONSILLECTOMY       Family History   Problem Relation Age of Onset    Stroke Mother     Basal cell carcinoma Mother     Cancer Father     Stroke Brother       Social History   Social History     Substance and Sexual Activity   Alcohol Use Yes    Comment: rare     Social History     Substance and Sexual Activity   Drug Use Never     Social History     Tobacco Use   Smoking Status Never   Smokeless Tobacco Never       Medications:     Current Outpatient Medications:     Ascorbic Acid (VITAMIN C) 100 MG tablet, Take 1,000 mg by mouth daily , Disp: , Rfl:     b complex vitamins capsule, Take 1 capsule by mouth daily, Disp: , Rfl:     carbidopa-levodopa (SINEMET)  mg per tablet, take 1 tab daily (9am, 5pm and 9pm) and 1.5 tab at 1pm daily, Disp: 405 tablet, Rfl: 2    clonazePAM (KlonoPIN) 0.5 MG disintegrating tablet, Take 1 tablet (0.5 mg total) by mouth daily at bedtime, Disp: 30 tablet, Rfl: 0    DULoxetine (CYMBALTA) 60 mg delayed release capsule, Take 1 capsule (60 mg total)  "by mouth daily, Disp: 90 capsule, Rfl: 3    ibuprofen (MOTRIN) 600 mg tablet, TAKE 1 TABLET BY MOUTH EVERY 6 HOURS AS NEEDED FOR PAIN FOR 10 DAYS, Disp: , Rfl:     MAGNESIUM PO, Take by mouth, Disp: , Rfl:     Multiple Vitamins-Minerals (MULTIVITAMIN WITH MINERALS) tablet, Take 1 tablet by mouth daily, Disp: , Rfl:     PARoxetine (PAXIL) 20 mg tablet, Take 1 tablet (20 mg total) by mouth daily, Disp: 90 tablet, Rfl: 0    trihexyphenidyl (ARTANE) 2 mg tablet, Take 1 tablet (2 mg total) by mouth 3 (three) times a day with meals, Disp: 270 tablet, Rfl: 0    fluorouracil (EFUDEX) 5 % cream, Apply to affected areas of face twice daily for 2 weeks. (Patient not taking: Reported on 8/7/2023), Disp: 40 g, Rfl: 0    tiZANidine (ZANAFLEX) 2 mg tablet, Take 2 mg by mouth (Patient not taking: Reported on 9/29/2022), Disp: , Rfl:     Zoster Vac Recomb Adjuvanted (SHINGRIX) 50 MCG/0.5ML SUSR, 0.5mL IM for one dose, followed by 0.5mL IM 2-6 months after first dose (Patient not taking: Reported on 1/25/2024), Disp: 1 each, Rfl: 1    No Known Allergies    OBJECTIVE  Vitals:   Vitals:    01/25/24 0845   BP: 124/80   BP Location: Left arm   Patient Position: Sitting   Cuff Size: Standard   Pulse: 96   Resp: 20   Temp: 97.7 °F (36.5 °C)   TempSrc: Tympanic   SpO2: 98%   Weight: 69.4 kg (153 lb)   Height: 5' 7\" (1.702 m)         Physical Exam  Constitutional:       Appearance: She is well-developed.   HENT:      Head: Normocephalic and atraumatic.   Cardiovascular:      Rate and Rhythm: Normal rate and regular rhythm.      Heart sounds: Normal heart sounds.   Pulmonary:      Effort: Pulmonary effort is normal. No respiratory distress.      Breath sounds: Normal breath sounds. No wheezing.   Musculoskeletal:         General: No tenderness. Normal range of motion.      Cervical back: Normal range of motion and neck supple.   Skin:     General: Skin is warm and dry.   Neurological:      Mental Status: She is alert and oriented to person, " place, and time.   Psychiatric:         Mood and Affect: Mood is anxious. Affect is tearful.         Behavior: Behavior normal.                    Bere Claros, DO    1/25/2024

## 2024-01-25 NOTE — ASSESSMENT & PLAN NOTE
Increased stress at home due to caring for mother in law and daughter who has a lot of mental health issues. Plan as above

## 2024-01-25 NOTE — ASSESSMENT & PLAN NOTE
Depression Screening Follow-up Plan: Patient's depression screening was positive with a PHQ-2 score of . Their PHQ-9 score was 11. Continue regular follow-up with their psychologist/therapist/psychiatrist who is managing their mental health condition(s).    Continue to follow with her therapist as scheduled  Will increase her paxil to 20 mg daily  Close follow up in 4-6 weeks for medication check up

## 2024-02-28 DIAGNOSIS — G20.A1 PARKINSON DISEASE: ICD-10-CM

## 2024-02-28 RX ORDER — TRIHEXYPHENIDYL HYDROCHLORIDE 2 MG/1
2 TABLET ORAL
Qty: 270 TABLET | Refills: 0 | Status: SHIPPED | OUTPATIENT
Start: 2024-02-28

## 2024-03-05 DIAGNOSIS — F41.9 ANXIETY: ICD-10-CM

## 2024-03-05 RX ORDER — CLONAZEPAM 0.5 MG/1
0.5 TABLET, ORALLY DISINTEGRATING ORAL
Qty: 30 TABLET | Refills: 0 | Status: SHIPPED | OUTPATIENT
Start: 2024-03-05

## 2024-03-05 NOTE — TELEPHONE ENCOUNTER
Recd  3/4 1213 PM    Jadyn BENSON. My doctor's doctor nuvia. I'm looking to refill my clonazepam 0.5 milligram. One time of day at night in the evening. The pharmacy is [place].  My birthdate is [birth year]. My phone number is [phone].   ___________________  Last visit Dr. Almanzar, 5/24  due for refill; please sign script if in agreement, thank you.

## 2024-04-01 DIAGNOSIS — F32.0 MAJOR DEPRESSIVE DISORDER, SINGLE EPISODE, MILD (HCC): ICD-10-CM

## 2024-04-01 RX ORDER — DULOXETIN HYDROCHLORIDE 60 MG/1
60 CAPSULE, DELAYED RELEASE ORAL DAILY
Qty: 90 CAPSULE | Refills: 1 | Status: SHIPPED | OUTPATIENT
Start: 2024-04-01

## 2024-04-20 DIAGNOSIS — F41.9 ANXIETY: ICD-10-CM

## 2024-04-22 RX ORDER — PAROXETINE HYDROCHLORIDE 20 MG/1
20 TABLET, FILM COATED ORAL DAILY
Qty: 90 TABLET | Refills: 0 | Status: SHIPPED | OUTPATIENT
Start: 2024-04-22

## 2024-05-20 DIAGNOSIS — F41.9 ANXIETY: ICD-10-CM

## 2024-05-20 NOTE — TELEPHONE ENCOUNTER
5/20/24, 2:54    Pt left a vm requesting refill of klonopin be sent to [facility] pharmacy on file    Rx entered. Pls review and sign off    thanks

## 2024-05-21 RX ORDER — CLONAZEPAM 0.5 MG/1
0.5 TABLET, ORALLY DISINTEGRATING ORAL
Qty: 30 TABLET | Refills: 0 | Status: SHIPPED | OUTPATIENT
Start: 2024-05-21

## 2024-05-28 DIAGNOSIS — G20.A1 PARKINSON DISEASE: ICD-10-CM

## 2024-05-28 NOTE — TELEPHONE ENCOUNTER
Received VM 24 252 pm    Yes, hi, the name is Jadyn brunson DOB 1960.  I have 2 prescriptions. One is Trihexphenidyl  2 milligram tablet and needs refills. I2nd refill request is Sinemet  mg. I  missed an appointment with the doctor, but I do get to see her . I'm going to need this as soon as possible charo I am going away to [place] and I'm running out of those, so I can't wait till the . I lost a family member and I fly out to [place] on the . So I need to  my prescriptions up by the . Well, if he could have meds sent to [facility] in [place]; . Thank you.    -----------------    LOV 23 with Dr Almanzar  Next OV 24 with Dr Almanzar    Routed to provider Dr Almanzar, if agreeable to send pended meds to requested pharmacy.

## 2024-05-29 RX ORDER — TRIHEXYPHENIDYL HYDROCHLORIDE 2 MG/1
2 TABLET ORAL
Qty: 270 TABLET | Refills: 0 | Status: SHIPPED | OUTPATIENT
Start: 2024-05-29

## 2024-07-05 DIAGNOSIS — F41.9 ANXIETY: ICD-10-CM

## 2024-07-05 NOTE — TELEPHONE ENCOUNTER
Reason for call:   [x] Refill   [] Prior Auth  [] Other:     Office:   [] PCP/Provider -   [x] Specialty/Provider - neuro    Medication:       Pharmacy: [facility] [address] [place] [address] - [facility] [place] TARA KEANE [phone]     Does the patient have enough for 3 days?   [] Yes   [x] No - Send as HP to POD

## 2024-07-08 RX ORDER — CLONAZEPAM 0.5 MG/1
0.5 TABLET, ORALLY DISINTEGRATING ORAL
Qty: 30 TABLET | Refills: 0 | Status: SHIPPED | OUTPATIENT
Start: 2024-07-08

## 2024-07-21 DIAGNOSIS — F41.9 ANXIETY: ICD-10-CM

## 2024-07-21 RX ORDER — PAROXETINE HYDROCHLORIDE 20 MG/1
20 TABLET, FILM COATED ORAL DAILY
Qty: 90 TABLET | Refills: 0 | Status: SHIPPED | OUTPATIENT
Start: 2024-07-21

## 2024-08-22 DIAGNOSIS — F41.9 ANXIETY: ICD-10-CM

## 2024-08-22 DIAGNOSIS — G20.A1 PARKINSON DISEASE: ICD-10-CM

## 2024-08-30 NOTE — TELEPHONE ENCOUNTER
Meds pended and routed to provider to review. Last OV was 5/24/23. Virtual visit is scheduled on 9/26/24.

## 2024-08-30 NOTE — TELEPHONE ENCOUNTER
Scheduled patient with Dr Almanzar virtual visit 9/26 @ 1 pm.    Patient is also requesting a refill on klonopin 0.5 mg.    Please assist

## 2024-09-03 RX ORDER — CLONAZEPAM 0.5 MG/1
0.5 TABLET, ORALLY DISINTEGRATING ORAL
Qty: 30 TABLET | Refills: 0 | Status: SHIPPED | OUTPATIENT
Start: 2024-09-03

## 2024-09-03 RX ORDER — TRIHEXYPHENIDYL HYDROCHLORIDE 2 MG/1
2 TABLET ORAL
Qty: 270 TABLET | Refills: 0 | Status: SHIPPED | OUTPATIENT
Start: 2024-09-03

## 2024-09-22 DIAGNOSIS — F32.0 MAJOR DEPRESSIVE DISORDER, SINGLE EPISODE, MILD (HCC): ICD-10-CM

## 2024-09-23 RX ORDER — DULOXETIN HYDROCHLORIDE 60 MG/1
60 CAPSULE, DELAYED RELEASE ORAL DAILY
Qty: 30 CAPSULE | Refills: 0 | Status: SHIPPED | OUTPATIENT
Start: 2024-09-23

## 2024-09-25 ENCOUNTER — TELEPHONE (OUTPATIENT)
Age: 64
End: 2024-09-25

## 2024-09-25 NOTE — TELEPHONE ENCOUNTER
Inbound call received from patient.     Patient wanted to confirm her appt for tomorrow with Dr. Almanzar. Pt stated that she keeps getting messages stating that that appt is at the [place] office.     RN reviewed pt's appt desk, confirmed with pt that tomorrow's appt is a Virtual Vist at 1pm with Dr. Almanzar. Advised pt to try and log into her portal to make sure pt understands how to log into the appt tomorrow. Pt verbalized understanding. Pt aware to contact the [employer] help line for any questions or issues with getting on the edie.

## 2024-10-16 ENCOUNTER — OFFICE VISIT (OUTPATIENT)
Dept: NEUROLOGY | Facility: CLINIC | Age: 64
End: 2024-10-16
Payer: COMMERCIAL

## 2024-10-16 VITALS — DIASTOLIC BLOOD PRESSURE: 79 MMHG | SYSTOLIC BLOOD PRESSURE: 134 MMHG | WEIGHT: 155.8 LBS | BODY MASS INDEX: 24.4 KG/M2

## 2024-10-16 DIAGNOSIS — G20.B2 PARKINSON'S DISEASE WITH DYSKINESIA AND FLUCTUATING MANIFESTATIONS (HCC): Primary | ICD-10-CM

## 2024-10-16 DIAGNOSIS — G25.81 RLS (RESTLESS LEGS SYNDROME): ICD-10-CM

## 2024-10-16 PROCEDURE — 99214 OFFICE O/P EST MOD 30 MIN: CPT | Performed by: PSYCHIATRY & NEUROLOGY

## 2024-10-16 NOTE — ASSESSMENT & PLAN NOTE
Jadyn Garza is a 64 y.o. female seen for f/u for PD.    Pertinent PMHx: breast cancer (BRRCA/ HRT r+) in 1990 & 2010 s/p surgery and chemotherapy , depression, RLS.    Has wearing around 3pm with left sided bradykinesia, gait difficulty and increase posturing/ dystonia of the LUE, LLE along with increased fatigue. She complained of this during May 2023 visit and her 1PM Sinemet dose was increased, which has improved her syx moderately.   Has developed dyskinesias over the past 6 months.     Unfortunately, has had significant personal stress during the summer which exacerbated her syx even more.     Current regimen:  Sinemet 25/100 1 tab at (9am, 5pm and 9pm) and 1.5 tabs at 1pm  Artane 2mg tid (9am, 3pm, 9pm)  Klonopin 0.5mg 1/2 tab qhs      We discussed various options with the patient including: Increasing dose of current Sinemet formulation, and adding amantadine, switching to a longer acting carbidopa levodopa formulation.  For now, she would like to monitor her symptoms and maintain her current regimen as she is moving to [facility] and has a number of personal obligations.    Will discuss changes in future treatment options during next visit.  Follow-up in 5 months with Dr. Almanzar.  Staffed w/ Dr. Almanzar at [facility]

## 2024-10-16 NOTE — PROGRESS NOTES
Review of Systems   Constitutional:  Positive for fatigue (towards the Afternoon). Negative for appetite change and fever.   HENT:  Positive for trouble swallowing (Occasionally). Negative for hearing loss, tinnitus and voice change.    Eyes: Negative.  Negative for photophobia, pain and visual disturbance.   Respiratory: Negative.  Negative for shortness of breath.    Cardiovascular: Negative.  Negative for palpitations.   Gastrointestinal: Negative.  Negative for nausea and vomiting.   Endocrine: Negative.  Negative for cold intolerance.   Genitourinary: Negative.  Negative for dysuria, frequency and urgency.   Musculoskeletal:  Positive for gait problem (At times, Tends to slide the Left Foot. Also states at times foot curls under her also). Negative for back pain, myalgias, neck pain and neck stiffness.        Balance Issues, Has been more unstable and has broken her toe Twice on Left Foot  Restless Leg   Skin: Negative.  Negative for rash.   Allergic/Immunologic: Negative.    Neurological:  Positive for dizziness (If leanning too far forward) and tremors (Left Side, has stayed the same). Negative for seizures, syncope, facial asymmetry, speech difficulty, weakness, light-headedness, numbness and headaches.   Hematological: Negative.  Does not bruise/bleed easily.   Psychiatric/Behavioral:  Positive for sleep disturbance (Trouble staying asleep, states RLS can wake her up and then hard for her to go back to sleep). Negative for confusion and hallucinations.    All other systems reviewed and are negative.

## 2024-10-20 DIAGNOSIS — F41.9 ANXIETY: ICD-10-CM

## 2024-10-21 DIAGNOSIS — F41.9 ANXIETY: ICD-10-CM

## 2024-10-21 RX ORDER — CLONAZEPAM 0.5 MG/1
0.5 TABLET, ORALLY DISINTEGRATING ORAL
Qty: 30 TABLET | Refills: 0 | Status: SHIPPED | OUTPATIENT
Start: 2024-10-21

## 2024-10-21 NOTE — TELEPHONE ENCOUNTER
Reason for call:   [x] Refill   [] Prior Auth  [] Other:     Office:   [] PCP/Provider -   [x] Specialty/Provider - NEURO Kimberly Almanzar    Medication: Clonazepam disintegrating     Dose/Frequency: 0.5 mg Q HS     Quantity: 30    Pharmacy: [facility]     Does the patient have enough for 3 days?   [] Yes   [x] No - Send as HP to POD

## 2024-10-21 NOTE — TELEPHONE ENCOUNTER
Medication: Klonopin   PDMP  09/03/2024 09/03/2024 clonazePAM (Tablet, Disintegrating) 30.0 30 0.5 MG NA [facility]  [place]ic[place] 0 / 0 [place]   1 [id number] 07/08/2024 07/08/2024 clonazePAM (Tablet, Disintegrating) 30.0 30 0.5 MG NA [facility]  Medic[place] 0 / 0 [place]   1 [id number] 05/28/2024 05/21/2024 clonazePAM (Tablet, Disintegrating) 30.0 30 0.5 MG NA [facility]  Medic[place] 0 / 0 [place]   1 [id number] 03/05/2024 03/05/2024 clonazePAM (Tablet, Disintegrating) 30.0 30 0.5 MG [facility]  Medicare 0 / 0 [place]  Active agreement on file -No    Refill must be reviewed and completed by the office or provider. The refill is unable to be approved or denied by the medication management team.

## 2024-10-22 DIAGNOSIS — F32.0 MAJOR DEPRESSIVE DISORDER, SINGLE EPISODE, MILD (HCC): ICD-10-CM

## 2024-10-22 RX ORDER — PAROXETINE 20 MG/1
20 TABLET, FILM COATED ORAL DAILY
Qty: 90 TABLET | Refills: 1 | Status: SHIPPED | OUTPATIENT
Start: 2024-10-22

## 2024-10-24 ENCOUNTER — TELEPHONE (OUTPATIENT)
Age: 64
End: 2024-10-24

## 2024-10-24 RX ORDER — DULOXETIN HYDROCHLORIDE 60 MG/1
60 CAPSULE, DELAYED RELEASE ORAL DAILY
Qty: 30 CAPSULE | Refills: 0 | Status: SHIPPED | OUTPATIENT
Start: 2024-10-24

## 2024-10-24 NOTE — TELEPHONE ENCOUNTER
Patient's daughter wanted to find out about Telehealth Option for the patient and wanted to see if she in on Medical consent form.  Notified her that patient can have virtual visit which are appropriate and she has to be in [place] for virtual visit. Also told her that she is no the medical consent form after checking.  FRANCOIS

## 2024-10-29 NOTE — TELEPHONE ENCOUNTER
Patient called requesting refill for clonazePAM (KlonoPIN) 0.5 MG disintegrating tablet. Patient made aware medication was refilled on 10/21/2024 for 30 tablets with 0 refills to [facility] [address] [place] [address]. Patient instructed to contact the pharmacy to obtain refills of medication. Patient verbalized understanding.

## 2024-11-23 DIAGNOSIS — F32.0 MAJOR DEPRESSIVE DISORDER, SINGLE EPISODE, MILD (HCC): ICD-10-CM

## 2024-11-25 RX ORDER — DULOXETIN HYDROCHLORIDE 60 MG/1
60 CAPSULE, DELAYED RELEASE ORAL DAILY
Qty: 30 CAPSULE | Refills: 5 | Status: SHIPPED | OUTPATIENT
Start: 2024-11-25

## 2024-12-08 ENCOUNTER — APPOINTMENT (EMERGENCY)
Dept: RADIOLOGY | Facility: HOSPITAL | Age: 64
End: 2024-12-08
Payer: COMMERCIAL

## 2024-12-08 ENCOUNTER — HOSPITAL ENCOUNTER (EMERGENCY)
Facility: HOSPITAL | Age: 64
Discharge: HOME | End: 2024-12-08
Attending: EMERGENCY MEDICINE
Payer: COMMERCIAL

## 2024-12-08 VITALS
DIASTOLIC BLOOD PRESSURE: 82 MMHG | OXYGEN SATURATION: 94 % | TEMPERATURE: 98.7 F | HEART RATE: 105 BPM | RESPIRATION RATE: 18 BRPM | SYSTOLIC BLOOD PRESSURE: 126 MMHG

## 2024-12-08 DIAGNOSIS — S63.619A FINGER SPRAIN: Primary | ICD-10-CM

## 2024-12-08 DIAGNOSIS — M79.644 FINGER PAIN, RIGHT: ICD-10-CM

## 2024-12-08 PROCEDURE — 73130 X-RAY EXAM OF HAND: CPT

## 2024-12-08 PROCEDURE — 99284 EMERGENCY DEPT VISIT MOD MDM: CPT

## 2024-12-08 NOTE — ED PROVIDER NOTES
Time reflects when diagnosis was documented in both MDM as applicable and the Disposition within this note       Time User Action Codes Description Comment    12/8/2024  5:12 PM Vane Varela Add [S63.619A] Finger sprain     12/8/2024  5:13 PM Vane Varela Modify [S63.619A] Finger sprain right fourth, right fifth    12/8/2024  5:13 PM Vane Varela Add [M79.644] Finger pain, right           ED Disposition       ED Disposition   Discharge    Condition   Stable    Date/Time   Sun Dec 8, 2024  5:12 PM    Comment   Jadyn Garza discharge to home/self care.                   Assessment & Plan       Medical Decision Making  The patient's presentation of pain in the left fourth and fifth digits with radiation to the wrist following a fall raises concern for a fracture, dislocation, ligament sprain, contusion, other soft tissue injury, etc. No evidence of neurovascular compromise.  Patient exhibits full range of motion in the right wrist, elbow and shoulder. Will obtain x-rays to assess for acute osseous abnormalities, etc. Patient deferred pain medication.    No acute osseous abnormality on imaging, formal radiology reading pending at time of discharge. Will place patient in finger splint and provide referral to ortho hand. Emphasized importance of following up with PCP and ortho specialist, and strict return precautions discussed. Patient verbalized understanding and agreement to plan.    Amount and/or Complexity of Data Reviewed  Radiology: ordered and independent interpretation performed.             Medications - No data to display    ED Risk Strat Scores                           SBIRT 20yo+      Flowsheet Row Most Recent Value   Initial Alcohol Screen: US AUDIT-C     1. How often do you have a drink containing alcohol? 0 Filed at: 12/08/2024 1650   2. How many drinks containing alcohol do you have on a typical day you are drinking?  0 Filed at: 12/08/2024 1659   3a. Male UNDER 65: How often do you have five or  more drinks on one occasion? 0 Filed at: 12/08/2024 1655   3b. FEMALE Any Age, or MALE 65+: How often do you have 4 or more drinks on one occassion? 0 Filed at: 12/08/2024 1655   Audit-C Score 0 Filed at: 12/08/2024 1655   JUVENAL: How many times in the past year have you...    Used an illegal drug or used a prescription medication for non-medical reasons? Never Filed at: 12/08/2024 1655                            History of Present Illness       Chief Complaint   Patient presents with    Fall     Has parkinson's was leaning over to pet a dog when she lost her balance and fell backwards, caught herself with her right hand, now had right pinky finger. Occurred around 330. Did not hit her head, no LOC.        Past Medical History:   Diagnosis Date    Anxiety     Breast cancer (HCC)     bilateral    Depression     Parkinson disease (HCC)     Squamous cell skin cancer 11/23/2020    SCCIS right clavicle      Past Surgical History:   Procedure Laterality Date    BREAST RECONSTRUCTION Bilateral     GANGLION CYST EXCISION Left     leg    MASTECTOMY Bilateral     MOHS SURGERY  01/06/2021    SCCIS right clavicle, Dr. Perla     SKIN BIOPSY      TONSILLECTOMY        Family History   Problem Relation Age of Onset    Stroke Mother     Basal cell carcinoma Mother     Cancer Father     Stroke Brother       Social History     Tobacco Use    Smoking status: Never    Smokeless tobacco: Never   Vaping Use    Vaping status: Never Used   Substance Use Topics    Alcohol use: Yes     Comment: rare    Drug use: Never      E-Cigarette/Vaping    E-Cigarette Use Never User       E-Cigarette/Vaping Substances    Nicotine No     THC No     CBD No     Flavoring No     Other No     Unknown No       I have reviewed and agree with the history as documented.     The patient is a 64-year-old female with a history of Parkinson's disease amongst other chronic medical conditions presenting to the emergency department for evaluation of left fourth and fifth  digit pain following a mechanical fall. The patient reports losing her balance while bending down to pet a dog, falling backward onto her buttocks with her right hand bracing the fall.  She denies striking her head, loss of consciousness or prodromal symptoms such as dizziness, lightheadedness, syncope or presyncope.  She denies any other injuries or pain elsewhere, reporting pain localized solely to the left fourth and fifth digits with radiation to the wrist.  She maintains full range of motion in her wrist, elbow and shoulder without difficulty and denies pain in the hips, buttocks or any other regions.      Fall  Associated symptoms: no chest pain and no headaches        Review of Systems   Constitutional:  Negative for chills and fever.   Respiratory:  Negative for shortness of breath.    Cardiovascular:  Negative for chest pain.   Musculoskeletal:  Positive for arthralgias and myalgias.   Skin:  Negative for color change and wound.   Neurological:  Negative for dizziness, tremors, weakness, numbness and headaches.   All other systems reviewed and are negative.          Objective       ED Triage Vitals [12/08/24 1637]   Temperature Pulse Blood Pressure Respirations SpO2 Patient Position - Orthostatic VS   98.7 °F (37.1 °C) 105 126/82 18 94 % Sitting      Temp Source Heart Rate Source BP Location FiO2 (%) Pain Score    Temporal -- Left arm -- 8      Vitals      Date and Time Temp Pulse SpO2 Resp BP Pain Score FACES Pain Rating User   12/08/24 1637 98.7 °F (37.1 °C) 105 94 % 18 126/82 8 -- SV            Physical Exam  Vitals and nursing note reviewed.   Constitutional:       General: She is not in acute distress.     Appearance: Normal appearance. She is well-developed. She is not ill-appearing.   HENT:      Head: Normocephalic and atraumatic.   Eyes:      Conjunctiva/sclera: Conjunctivae normal.   Pulmonary:      Effort: Pulmonary effort is normal.   Abdominal:      Tenderness: There is no abdominal tenderness.    Musculoskeletal:         General: Tenderness (localized to the area between the MCP and PIP joints of the right fifth digit) present. No swelling or deformity.      Cervical back: Neck supple.      Comments: No visible deformity, swelling, or bruising over the right fourth and fifth digits. Normal alignment and skin appearance. No bony deformities or crepitus. Discomfort with flexion and extension of the right fifth and fourth digit. No evidence of neurovascular compromise. Cap refill <2s. Pulses intact and equal bilaterally.   Skin:     General: Skin is warm and dry.      Capillary Refill: Capillary refill takes less than 2 seconds.   Neurological:      Mental Status: She is alert and oriented to person, place, and time. Mental status is at baseline.      GCS: GCS eye subscore is 4. GCS verbal subscore is 5. GCS motor subscore is 6.      Cranial Nerves: No dysarthria.      Sensory: No sensory deficit.      Motor: Tremor (Patient with known Parkinson's disease with dyskinesia) present. No weakness.      Gait: Gait is intact.   Psychiatric:         Mood and Affect: Mood normal.         Results Reviewed       None            XR hand 3+ views RIGHT   ED Interpretation by Vane Varela PA-C (12/08 5276)   No acute osseous abnormality on my independent interpretation. Formal radiology reading pending.      Final Interpretation by Norma Rojas MD (12/08 7828)      No acute osseous abnormality.         Computerized Assisted Algorithm (CAA) may have been used to analyze all applicable images.         Workstation performed: [id number]             Procedures    ED Medication and Procedure Management   Prior to Admission Medications   Prescriptions Last Dose Informant Patient Reported? Taking?   Ascorbic Acid (VITAMIN C) 100 MG tablet  Self Yes No   Sig: Take 1,000 mg by mouth daily    DULoxetine (CYMBALTA) 60 mg delayed release capsule   No No   Sig: Take 1 capsule by mouth once daily   MAGNESIUM PO  Self Yes No    Sig: Take by mouth   Multiple Vitamins-Minerals (MULTIVITAMIN WITH MINERALS) tablet  Self Yes No   Sig: Take 1 tablet by mouth daily   PARoxetine (PAXIL) 20 mg tablet   No No   Sig: Take 1 tablet by mouth once daily   Zoster Vac Recomb Adjuvanted (SHINGRIX) 50 MCG/0.5ML SUSR  Self No No   Si.5mL IM for one dose, followed by 0.5mL IM 2-6 months after first dose   Patient not taking: Reported on 2024   b complex vitamins capsule  Self Yes No   Sig: Take 1 capsule by mouth daily   carbidopa-levodopa (SINEMET)  mg per tablet   No No   Sig: take 1 tab daily (9am, 5pm and 9pm) and 1.5 tab at 1pm daily   clonazePAM (KlonoPIN) 0.5 MG disintegrating tablet   No No   Sig: Take 1 tablet (0.5 mg total) by mouth daily at bedtime   fluorouracil (EFUDEX) 5 % cream  Self No No   Sig: Apply to affected areas of face twice daily for 2 weeks.   Patient not taking: Reported on 2023   ibuprofen (MOTRIN) 600 mg tablet  Self Yes No   Sig: TAKE 1 TABLET BY MOUTH EVERY 6 HOURS AS NEEDED FOR PAIN FOR 10 DAYS   tiZANidine (ZANAFLEX) 2 mg tablet   Yes No   Sig: Take 2 mg by mouth   Patient not taking: Reported on 2022   trihexyphenidyl (ARTANE) 2 mg tablet   No No   Sig: TAKE 1 TABLET BY MOUTH THREE TIMES DAILY WITH MEALS      Facility-Administered Medications: None     Discharge Medication List as of 2024  5:15 PM        CONTINUE these medications which have NOT CHANGED    Details   Ascorbic Acid (VITAMIN C) 100 MG tablet Take 1,000 mg by mouth daily , Historical Med      b complex vitamins capsule Take 1 capsule by mouth daily, Historical Med      carbidopa-levodopa (SINEMET)  mg per tablet take 1 tab daily (9am, 5pm and 9pm) and 1.5 tab at 1pm daily, Normal      clonazePAM (KlonoPIN) 0.5 MG disintegrating tablet Take 1 tablet (0.5 mg total) by mouth daily at bedtime, Starting Mon 10/21/2024, Normal      DULoxetine (CYMBALTA) 60 mg delayed release capsule Take 1 capsule by mouth once daily, Starting Mon  11/25/2024, Normal      fluorouracil (EFUDEX) 5 % cream Apply to affected areas of face twice daily for 2 weeks., Normal      ibuprofen (MOTRIN) 600 mg tablet TAKE 1 TABLET BY MOUTH EVERY 6 HOURS AS NEEDED FOR PAIN FOR 10 DAYS, Historical Med      MAGNESIUM PO Take by mouth, Historical Med      Multiple Vitamins-Minerals (MULTIVITAMIN WITH MINERALS) tablet Take 1 tablet by mouth daily, Historical Med      PARoxetine (PAXIL) 20 mg tablet Take 1 tablet by mouth once daily, Starting Tue 10/22/2024, Normal      tiZANidine (ZANAFLEX) 2 mg tablet Take 2 mg by mouth, Starting Wed 7/13/2022, Until Mon 7/18/2022 at 2359, Historical Med      trihexyphenidyl (ARTANE) 2 mg tablet TAKE 1 TABLET BY MOUTH THREE TIMES DAILY WITH MEALS, Starting Tue 9/3/2024, Normal      Zoster Vac Recomb Adjuvanted (SHINGRIX) 50 MCG/0.5ML SUSR 0.5mL IM for one dose, followed by 0.5mL IM 2-6 months after first dose, Print             ED SEPSIS DOCUMENTATION   Time reflects when diagnosis was documented in both MDM as applicable and the Disposition within this note       Time User Action Codes Description Comment    12/8/2024  5:12 PM Vane Varela Add [S63.619A] Finger sprain     12/8/2024  5:13 PM Vane Varela Modify [S63.619A] Finger sprain right fourth, right fifth    12/8/2024  5:13 PM Vane Varela Add [M79.644] Finger pain, right                  Vane Varela PA-C  12/08/24 2057

## 2024-12-08 NOTE — DISCHARGE INSTRUCTIONS
Please follow-up with the orthopedics, a referral has been placed for you.    Please wear the finger splint that was provided to you for comfort.  Please protect, rest, ice, and elevate your hand to help with symptoms.    Return to the emergency department if you are experiencing worsening pain, swelling, difficulty with moving to your fingers, numbness, weakness or any new or worsening symptoms.

## 2024-12-10 ENCOUNTER — APPOINTMENT (EMERGENCY)
Dept: CT IMAGING | Facility: HOSPITAL | Age: 64
End: 2024-12-10
Payer: COMMERCIAL

## 2024-12-10 ENCOUNTER — HOSPITAL ENCOUNTER (EMERGENCY)
Facility: HOSPITAL | Age: 64
Discharge: HOME | End: 2024-12-10
Attending: EMERGENCY MEDICINE
Payer: COMMERCIAL

## 2024-12-10 VITALS
SYSTOLIC BLOOD PRESSURE: 143 MMHG | TEMPERATURE: 98 F | DIASTOLIC BLOOD PRESSURE: 82 MMHG | OXYGEN SATURATION: 96 % | HEART RATE: 98 BPM | RESPIRATION RATE: 18 BRPM
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
## 2024-12-10 DIAGNOSIS — S00.03XA CONTUSION OF SCALP, INITIAL ENCOUNTER: Primary | ICD-10-CM

## 2024-12-10 PROCEDURE — 99284 EMERGENCY DEPT VISIT MOD MDM: CPT

## 2024-12-10 PROCEDURE — 70450 CT HEAD/BRAIN W/O DYE: CPT

## 2024-12-10 PROCEDURE — 90715 TDAP VACCINE 7 YRS/> IM: CPT

## 2024-12-10 PROCEDURE — 90471 IMMUNIZATION ADMIN: CPT

## 2024-12-10 PROCEDURE — 72125 CT NECK SPINE W/O DYE: CPT

## 2024-12-10 RX ORDER — GINSENG 100 MG
1 CAPSULE ORAL ONCE
Status: COMPLETED | OUTPATIENT
Start: 2024-12-10 | End: 2024-12-10

## 2024-12-10 RX ADMIN — TETANUS TOXOID, REDUCED DIPHTHERIA TOXOID AND ACELLULAR PERTUSSIS VACCINE, ADSORBED 0.5 ML: 5; 2.5; 8; 8; 2.5 SUSPENSION INTRAMUSCULAR at 16:12

## 2024-12-10 RX ADMIN — BACITRACIN 1 SMALL APPLICATION: 500 OINTMENT TOPICAL at 16:15

## 2024-12-10 NOTE — DISCHARGE INSTRUCTIONS
Please make sure to follow-up with your primary care provider to discuss recent events as well as your neurologist.  Please return to the emergency department if you are experiencing worsening headache, swelling, vision changes, lightheadedness, dizziness, fainting, chest pain, shortness of breath, fever, chills or any new or worsening symptoms.    Additionally, please keep the appointment for orthopedics for this Friday due to the prior mechanical fall from 2 days ago.    Please keep the wound clean and dry and apply bacitracin on it several times per day.  Please monitor for any signs of infection such as increased redness, swelling, discharge, increased pain, fever, chills, etc.

## 2024-12-10 NOTE — ED PROVIDER NOTES
Time reflects when diagnosis was documented in both MDM as applicable and the Disposition within this note       Time User Action Codes Description Comment    12/10/2024  6:14 PM Vane Varela Add [S00.03XA] Contusion of scalp, initial encounter     12/10/2024  6:15 PM Vane Varela Add [W19.XXXA] Fall, initial encounter     12/10/2024  6:15 PM Vane Varela Modify [W19.XXXA] Fall, initial encounter mechanical          ED Disposition       ED Disposition   Discharge    Condition   Stable    Date/Time   Tue Dec 10, 2024  6:14 PM    Comment   Jadyn Garza discharge to home/self care.                   Assessment & Plan       Medical Decision Making  The patient is a 64-year-old female with Parkinson's disease presenting after a mechanical fall with isolated facial trauma. The absence of loss of consciousness, prodromal symptoms, or other injuries suggests a straightforward mechanical etiology. However, given her history of Parkinson's disease, the differential includes potential contributing factors such as postural instability, intracranial abnormality, soft tissue injury, etc. Will obtain head and neck imaging, wound care, update tetanus.    CTH with no acute intracranial abnormality and no cervical spine fracture or traumatic malalignment. Discussed wound care and strict return precautions. Emphasized importance of following up with established neurologist and PCP. Daughter at the bedside. Patient verbalized understanding and agreement to plan. Discharged in no acute distress.    Amount and/or Complexity of Data Reviewed  Radiology: ordered.    Risk  OTC drugs.  Prescription drug management.        ED Course as of 12/13/24 1823   Tue Dec 10, 2024   1819 Patient is well appearing, daughter at the bedside. Wound cleaned, irrigated and bacitracin applied.       Medications   tetanus-diphtheria-acellular pertussis (BOOSTRIX) IM injection 0.5 mL (0.5 mL Intramuscular Given 12/10/24 1612)   bacitracin topical  ointment 1 small application (1 small application Topical Given 12/10/24 1615)       ED Risk Strat Scores                           SBIRT 22yo+      Flowsheet Row Most Recent Value   Initial Alcohol Screen: US AUDIT-C     1. How often do you have a drink containing alcohol? 0 Filed at: 12/10/2024 1505   2. How many drinks containing alcohol do you have on a typical day you are drinking?  0 Filed at: 12/10/2024 1505   3a. Male UNDER 65: How often do you have five or more drinks on one occasion? 0 Filed at: 12/10/2024 1505   3b. FEMALE Any Age, or MALE 65+: How often do you have 4 or more drinks on one occassion? 0 Filed at: 12/10/2024 1505   Audit-C Score 0 Filed at: 12/10/2024 1505   JUVENAL: How many times in the past year have you...    Used an illegal drug or used a prescription medication for non-medical reasons? Never Filed at: 12/10/2024 1505                            History of Present Illness       Chief Complaint   Patient presents with    Fall     Patient reports to ED c/p fall due to parkinsons. Patient complaining of pain to forehead and nose. Small laceration noted. Denies LOC. Denies thinners.        Past Medical History:   Diagnosis Date    Anxiety     Breast cancer (HCC)     bilateral    Depression     Parkinson disease (HCC)     Squamous cell skin cancer 11/23/2020    SCCIS right clavicle      Past Surgical History:   Procedure Laterality Date    BREAST RECONSTRUCTION Bilateral     GANGLION CYST EXCISION Left     leg    MASTECTOMY Bilateral     MOHS SURGERY  01/06/2021    SCCIS right clavicle, Dr. Perla     SKIN BIOPSY      TONSILLECTOMY        Family History   Problem Relation Age of Onset    Stroke Mother     Basal cell carcinoma Mother     Cancer Father     Stroke Brother       Social History     Tobacco Use    Smoking status: Never    Smokeless tobacco: Never   Vaping Use    Vaping status: Never Used   Substance Use Topics    Alcohol use: Yes     Comment: rare    Drug use: Never       E-Cigarette/Vaping    E-Cigarette Use Never User       E-Cigarette/Vaping Substances    Nicotine No     THC No     CBD No     Flavoring No     Other No     Unknown No       I have reviewed and agree with the history as documented.     The patient is a 64-year-old female with a history of Parkinson's disease amongst other chronic medical conditions presenting to the emergency department for evaluation after sustaining a mechanical fall shortly prior to arrival.  Patient arrived by ambulance.  Patient reports she was stepping out of a camper and missed a step causing her to fall forward striking her face onto the ground.  Patient denies loss of consciousness or prodromal symptoms such as dizziness, lightheadedness, syncope or presyncope. Patient reports she was able to stand up on her own and has been ambulating since the incident. Patient only reports pain at the site of the forehead wound. Denies any recent infections/illnesses, issues with appetite, bowel/bladder function, or memory. Denies pain elsewhere or any other complaints at this time.      Fall  Associated symptoms: no back pain, no headaches and no neck pain        Review of Systems   Constitutional:  Negative for activity change, chills, fatigue and fever.   Musculoskeletal:  Positive for myalgias. Negative for arthralgias, back pain, gait problem, neck pain and neck stiffness.   Skin:  Positive for wound (right forehead).   Neurological:  Positive for tremors (Patient with known Parkinson's disease with dyskinesia). Negative for dizziness, syncope, weakness, light-headedness, numbness and headaches.   All other systems reviewed and are negative.          Objective       ED Triage Vitals [12/10/24 1503]   Temperature Pulse Blood Pressure Respirations SpO2 Patient Position - Orthostatic VS   98 °F (36.7 °C) 100 131/73 18 97 % Lying      Temp Source Heart Rate Source BP Location FiO2 (%) Pain Score    Temporal Monitor Right arm -- --      Vitals      Date  and Time Temp Pulse SpO2 Resp BP Pain Score FACES Pain Rating User   12/10/24 1800 -- 98 96 % 18 143/82 -- -- RD   12/10/24 1730 -- 96 96 % 18 143/80 -- -- RD   12/10/24 1715 -- 94 96 % 18 149/77 -- -- RD   12/10/24 1630 -- 100 95 % 18 141/69 -- -- RD   12/10/24 1503 98 °F (36.7 °C) 100 97 % 18 131/73 -- -- ELF            Physical Exam  Vitals and nursing note reviewed.   Constitutional:       General: She is not in acute distress.     Appearance: Normal appearance. She is well-developed. She is not ill-appearing.   HENT:      Head: Normocephalic and atraumatic.      Right Ear: Tympanic membrane normal.      Left Ear: Tympanic membrane normal.      Nose: Nose normal.      Mouth/Throat:      Mouth: Mucous membranes are moist.   Eyes:      Extraocular Movements: Extraocular movements intact.      Conjunctiva/sclera: Conjunctivae normal.      Pupils: Pupils are equal, round, and reactive to light.   Cardiovascular:      Rate and Rhythm: Normal rate and regular rhythm.      Heart sounds: No murmur heard.  Pulmonary:      Effort: Pulmonary effort is normal. No respiratory distress.      Breath sounds: Normal breath sounds.   Abdominal:      General: Abdomen is flat.      Palpations: Abdomen is soft.      Tenderness: There is no abdominal tenderness.   Musculoskeletal:         General: Swelling present. Normal range of motion.      Cervical back: Normal range of motion and neck supple. No rigidity or tenderness.      Comments: Mild swelling over the right fifth and fourth digit.  Patient recently evaluated in the ED due to the fall.  X-rays were noted to be negative.   Skin:     General: Skin is warm and dry.      Capillary Refill: Capillary refill takes less than 2 seconds.      Findings: Abrasion, erythema and wound present.      Comments: Contusion over the right forehead, no active bleeding   Neurological:      General: No focal deficit present.      Mental Status: She is alert and oriented to person, place, and time.  Mental status is at baseline.      GCS: GCS eye subscore is 4. GCS verbal subscore is 5. GCS motor subscore is 6.      Sensory: No sensory deficit.      Motor: Tremor (baseline) present. No weakness.      Gait: Gait normal.   Psychiatric:         Mood and Affect: Mood normal.         Results Reviewed       None            CT head without contrast   Final Interpretation by Nelson Reid MD (12/10 1657)      No acute intracranial abnormality.      Minimal scalp contusion in the right frontal region.                  Workstation performed: [id number]         CT spine cervical without contrast   Final Interpretation by Nelson Reid MD (12/10 015)      No cervical spine fracture or traumatic malalignment.                  Workstation performed: [id number]             Procedures    ED Medication and Procedure Management   Prior to Admission Medications   Prescriptions Last Dose Informant Patient Reported? Taking?   Ascorbic Acid (VITAMIN C) 100 MG tablet  Self Yes No   Sig: Take 1,000 mg by mouth daily    DULoxetine (CYMBALTA) 60 mg delayed release capsule   No No   Sig: Take 1 capsule by mouth once daily   MAGNESIUM PO  Self Yes No   Sig: Take by mouth   Multiple Vitamins-Minerals (MULTIVITAMIN WITH MINERALS) tablet  Self Yes No   Sig: Take 1 tablet by mouth daily   PARoxetine (PAXIL) 20 mg tablet   No No   Sig: Take 1 tablet by mouth once daily   Zoster Vac Recomb Adjuvanted (SHINGRIX) 50 MCG/0.5ML SUSR  Self No No   Si.5mL IM for one dose, followed by 0.5mL IM 2-6 months after first dose   Patient not taking: Reported on 2024   b complex vitamins capsule  Self Yes No   Sig: Take 1 capsule by mouth daily   fluorouracil (EFUDEX) 5 % cream  Self No No   Sig: Apply to affected areas of face twice daily for 2 weeks.   Patient not taking: Reported on 2023   ibuprofen (MOTRIN) 600 mg tablet  Self Yes No   Sig: TAKE 1 TABLET BY MOUTH EVERY 6 HOURS AS NEEDED FOR PAIN FOR 10 DAYS   tiZANidine (ZANAFLEX) 2 mg  tablet   Yes No   Sig: Take 2 mg by mouth   Patient not taking: Reported on 9/29/2022   trihexyphenidyl (ARTANE) 2 mg tablet   No No   Sig: TAKE 1 TABLET BY MOUTH THREE TIMES DAILY WITH MEALS      Facility-Administered Medications: None     Discharge Medication List as of 12/10/2024  6:19 PM        CONTINUE these medications which have NOT CHANGED    Details   Ascorbic Acid (VITAMIN C) 100 MG tablet Take 1,000 mg by mouth daily , Historical Med      b complex vitamins capsule Take 1 capsule by mouth daily, Historical Med      DULoxetine (CYMBALTA) 60 mg delayed release capsule Take 1 capsule by mouth once daily, Starting Mon 11/25/2024, Normal      fluorouracil (EFUDEX) 5 % cream Apply to affected areas of face twice daily for 2 weeks., Normal      ibuprofen (MOTRIN) 600 mg tablet TAKE 1 TABLET BY MOUTH EVERY 6 HOURS AS NEEDED FOR PAIN FOR 10 DAYS, Historical Med      MAGNESIUM PO Take by mouth, Historical Med      Multiple Vitamins-Minerals (MULTIVITAMIN WITH MINERALS) tablet Take 1 tablet by mouth daily, Historical Med      PARoxetine (PAXIL) 20 mg tablet Take 1 tablet by mouth once daily, Starting Tue 10/22/2024, Normal      tiZANidine (ZANAFLEX) 2 mg tablet Take 2 mg by mouth, Starting Wed 7/13/2022, Until Mon 7/18/2022 at 2359, Historical Med      trihexyphenidyl (ARTANE) 2 mg tablet TAKE 1 TABLET BY MOUTH THREE TIMES DAILY WITH MEALS, Starting Tue 9/3/2024, Normal      Zoster Vac Recomb Adjuvanted (SHINGRIX) 50 MCG/0.5ML SUSR 0.5mL IM for one dose, followed by 0.5mL IM 2-6 months after first dose, Print      carbidopa-levodopa (SINEMET)  mg per tablet take 1 tab daily (9am, 5pm and 9pm) and 1.5 tab at 1pm daily, Normal      clonazePAM (KlonoPIN) 0.5 MG disintegrating tablet Take 1 tablet (0.5 mg total) by mouth daily at bedtime, Starting Mon 10/21/2024, Normal           No discharge procedures on file.  ED SEPSIS DOCUMENTATION   Time reflects when diagnosis was documented in both MDM as applicable and  the Disposition within this note       Time User Action Codes Description Comment    12/10/2024  6:14 PM Vane Varela [S00.03XA] Contusion of scalp, initial encounter     12/10/2024  6:15 PM Vane Varela Add [W19.XXXA] Fall, initial encounter     12/10/2024  6:15 PM Vane Varela Modify [W19.XXXA] Fall, initial encounter mechanical                 Vane Varela PA-C  12/13/24 1823

## 2024-12-11 ENCOUNTER — TELEPHONE (OUTPATIENT)
Dept: NEUROLOGY | Facility: CLINIC | Age: 64
End: 2024-12-11

## 2024-12-11 ENCOUNTER — NURSE TRIAGE (OUTPATIENT)
Age: 64
End: 2024-12-11

## 2024-12-11 DIAGNOSIS — F41.9 ANXIETY: ICD-10-CM

## 2024-12-11 DIAGNOSIS — G20.A1 PARKINSON DISEASE (HCC): ICD-10-CM

## 2024-12-11 RX ORDER — CLONAZEPAM 0.5 MG/1
0.5 TABLET, ORALLY DISINTEGRATING ORAL
Qty: 30 TABLET | Refills: 0 | Status: SHIPPED | OUTPATIENT
Start: 2024-12-11 | End: 2024-12-16 | Stop reason: SDUPTHER

## 2024-12-11 RX ORDER — CARBIDOPA AND LEVODOPA 25; 100 MG/1; MG/1
TABLET ORAL
Qty: 540 TABLET | Refills: 2 | Status: SHIPPED | OUTPATIENT
Start: 2024-12-11

## 2024-12-11 NOTE — TELEPHONE ENCOUNTER
Amelia Aguilera MD  [facility]39 minutes ago (2:50 PM)       She could try increasing carbidopa/levodopa 25/100 to 1.5 tabs 4 times daily to see if this improves gait and balance.

## 2024-12-11 NOTE — TELEPHONE ENCOUNTER
Pt also requesting medication refill for     Clonazepam 0.5 MG disintegrating tablet   Quantity 30 tablets with 0 refills  Pt to take 1 tablet (0.5 mg total) HS.    RN unable to check PDMP, please review. Medication pending please sign if agreeable.

## 2024-12-11 NOTE — TELEPHONE ENCOUNTER
"Inbound call received from patient.   Patient reported that she fell yesterday coming out of the door/walking down 3 steps out of a camper. Stated she feel face first and caused a contusion on her forehead and bloody nose. Pt evaluated at [facility] ED. Pt was advised (by ED) to alternate between Tylenol and Ibuprofen for pain.     Pt reported increase in tremors and unsteadiness of BLLE since about 4-6 weeks ago. Denied any recent infections/illnesses, issues with appetite, bowel/bladder, or memory.     Current medications confirmed as:  --Trihexyphenidyl 2 mg tablet TID with meals (0900, 1500, 2100)  -- Carbidopa-levodopa  mg  1 tab (0900, 1700, 2100) and 1.5 tab (1300)   -- Klonopin 0.5mg 1/2 tab qhs     Pt reported that she does not feel that the Artane and Carbidopa are not as effective anymore. Pt is asking if the medications should be increased or is it the time to switch to the other medications discussed at LOV.     Per LOV 10/16/24:  \"We discussed various options with the patient including: Increasing dose of current Sinemet formulation, and adding amantadine, switching to a longer acting carbidopa levodopa formulation.  For now, she would like to monitor her symptoms and maintain her current regimen as she is moving to [facility] and has a number of personal obligations.\"    Imaging done at ED:  CT-Head  CT C-Spine    NOV 4/16/25  Pt would like to see the provider sooner than April. RN reviewed schedule and no available HFU or FU appts available until mid January or later.   Advised pt to contact PCP to schedule FU appt as well.     Dr. Almanzar: please review and advise.    Clinical: please call pt back with provider's response.          Reason for Disposition   Nursing judgment    Answer Assessment - Initial Assessment Questions  Shuffling? Yes, Intermittent     Difficulty Walking? Yes, more difficulty picking up feet, viky left foot (worsened).     Recent Falls? Yes see above note    Any " "extra movements? Yes, increase in RUE more than last discussed OV.     Any Hallucinations? No    What time of day are symptoms worse? 1400    Current medications confirmed as:  --Trihexyphenidyl (ARTANE) 2 mg tablet TID with meals (0900, 1500, 2100)  -- Carbidopa-levodopa (SINEMET)  mg  1 tab (0900, 1700, 2100) and 1.5 tab (1300)   -- Klonopin 0.5mg 1/2 tab qhs     Ask how long after each dose do they feel that it \"wears off\"? 1 hour    Does patient have a DBS? No    Protocols used: No Protocol Available-Adult-OH    "

## 2024-12-16 DIAGNOSIS — F41.9 ANXIETY: ICD-10-CM

## 2024-12-16 RX ORDER — CLONAZEPAM 0.5 MG/1
0.5 TABLET, ORALLY DISINTEGRATING ORAL
Qty: 30 TABLET | Refills: 0 | Status: SHIPPED | OUTPATIENT
Start: 2024-12-16

## 2024-12-16 NOTE — TELEPHONE ENCOUNTER
Reason for call:   [x] Refill   [] Prior Auth  [x] Other: Patient stated the pharmacy stated the never received the request sent in 12/11/24 it says phoned in     Office:   [x] PCP/Provider -   [] Specialty/Provider -     Medication: clonazePAM (KlonoPIN) 0.5 MG disintegrating tablet     Dose/Frequency: 0.5 mg, Daily at bedtime     Quantity: 30    Pharmacy: [id number]    Does the patient have enough for 3 days?   [] Yes   [x] No - Send as HP to POD

## 2024-12-16 NOTE — TELEPHONE ENCOUNTER
Medication: Klonopin   PDMP  10/21/2024 10/21/2024 clonazePAM (Tablet, Disintegrating) 30.0 30 0.5 MG NA [facility]  Medicare 0 / 0 [place]   1 [id number] 09/03/2024 09/03/2024 clonazePAM (Tablet, Disintegrating) 30.0 30 0.5 MG [facility]  Medicare 0 / 0 [place]   1 [id number] 07/08/2024 07/08/2024 clonazePAM (Tablet, Disintegrating) 30.0 30 0.5 MG [facility]  Medicare 0 / 0 [place]   1 [id number] 05/28/2024 05/21/2024 clonazePAM (Tablet, Disintegrating) 30.0 30 0.5 MG [facility]  Medicare 0 / 0  Active agreement on file -No       Pharmacy told pt they never received this Rx

## 2024-12-20 DIAGNOSIS — G20.A1 PARKINSON DISEASE (HCC): ICD-10-CM

## 2024-12-20 NOTE — TELEPHONE ENCOUNTER
Reason for call:   [x] Refill   [] Prior Auth  [] Other:     Office:   [] PCP/Provider -   [x] Specialty/Provider - Amelia Aguilera, / neuro [place]    Medication:     trihexyphenidyl (ARTANE) 2 mg tablet       Dose/Frequency: : TAKE 1 TABLET BY MOUTH THREE TIMES DAILY WITH MEALS,     Quantity: 270    Pharmacy: [place] [id number]    Does the patient have enough for 3 days?   [] Yes   [x] No - Send as HP to POD

## 2024-12-23 RX ORDER — TRIHEXYPHENIDYL HYDROCHLORIDE 2 MG/1
2 TABLET ORAL
Qty: 270 TABLET | Refills: 1 | Status: SHIPPED | OUTPATIENT
Start: 2024-12-23

## 2025-01-28 DIAGNOSIS — F41.9 ANXIETY: ICD-10-CM

## 2025-01-28 RX ORDER — PAROXETINE 20 MG/1
20 TABLET, FILM COATED ORAL DAILY
Qty: 90 TABLET | Refills: 1 | Status: CANCELLED | OUTPATIENT
Start: 2025-01-28

## 2025-01-31 ENCOUNTER — TELEPHONE (OUTPATIENT)
Dept: OTHER | Facility: HOSPITAL | Age: 65
End: 2025-01-31

## 2025-01-31 NOTE — TELEPHONE ENCOUNTER
Patient called requesting refill for carb-levo. Patient made aware medication was refilled on  12/11/2024 for 540 with 2 refills to [facility] pharmacy. Patient instructed to contact the pharmacy to obtain refills of medication. Patient verbalized understanding.

## 2025-02-07 ENCOUNTER — TELEPHONE (OUTPATIENT)
Age: 65
End: 2025-02-07

## 2025-02-07 NOTE — TELEPHONE ENCOUNTER
Pharmacy called requesting to have patients pcp confirm that patient is supposed to be taking two medication together.  Duloxetine and Paxil.  Advised pharmacy we would reach out to the provider for follow up as the last OV looks like it was January 2024 and patient also follows with neurology and see's therapist regularly.  Please advise and either call pharmacy back to confirm or direct them to appropriate specialty. Thank you

## 2025-03-10 DIAGNOSIS — F41.9 ANXIETY: ICD-10-CM

## 2025-03-10 NOTE — TELEPHONE ENCOUNTER
Reason for call:   [x] Refill   [] Prior Auth  [] Other:     Office:   [] PCP/Provider -   [x] Specialty/Provider - Amelia Aguilera, neuro    Medication: clonazePAM (KlonoPIN) 0.5 MG disintegrating tablet [[id number]]    Dose/Frequency: Sig: Take 1 tablet (0.5 mg total) by mouth daily at bedtime    Quantity: 30    Pharmacy:   [address] [address]  Phone: [phone]  Fax: [phone]    Local Pharmacy   Does the patient have enough for 3 days?   [x] Yes   [] No - Send as HP to POD    Mail Away Pharmacy   Does the patient have enough for 10 days?   [] Yes   [] No - Send as HP to POD

## 2025-03-12 ENCOUNTER — APPOINTMENT (OUTPATIENT)
Dept: RADIOLOGY | Facility: CLINIC | Age: 65
End: 2025-03-12
Payer: COMMERCIAL

## 2025-03-12 ENCOUNTER — OFFICE VISIT (OUTPATIENT)
Dept: OBGYN CLINIC | Facility: CLINIC | Age: 65
End: 2025-03-12
Payer: COMMERCIAL

## 2025-03-12 VITALS — HEIGHT: 67 IN | BODY MASS INDEX: 24.96 KG/M2 | WEIGHT: 159 LBS

## 2025-03-12 DIAGNOSIS — S80.02XA CONTUSION OF LEFT KNEE, INITIAL ENCOUNTER: Primary | ICD-10-CM

## 2025-03-12 DIAGNOSIS — M25.562 ACUTE PAIN OF LEFT KNEE: ICD-10-CM

## 2025-03-12 PROCEDURE — 73564 X-RAY EXAM KNEE 4 OR MORE: CPT

## 2025-03-12 PROCEDURE — 99213 OFFICE O/P EST LOW 20 MIN: CPT | Performed by: ORTHOPAEDIC SURGERY

## 2025-03-12 RX ORDER — CLONAZEPAM 0.5 MG/1
0.5 TABLET, ORALLY DISINTEGRATING ORAL
Qty: 30 TABLET | Refills: 0 | Status: SHIPPED | OUTPATIENT
Start: 2025-03-12

## 2025-03-12 NOTE — PROGRESS NOTES
"Assessment:     1. Contusion of left knee, initial encounter        Plan:     Problem List Items Addressed This Visit          Surgery/Wound/Pain    Contusion of left knee - Primary    Findings consistent with left knee contusion.  Findings and treatment options were discussed with the patient.  X-rays were reviewed with her.  Discussed x-rays revealed no evidence of acute fracture.  Discussed this condition can take up to 3 to 4 months to fully resolve.  Recommend ice, elevation and NSAIDs as needed for pain.  Recommend low impact exercises such as stationary bicycle or swimming.  Try to avoid kneeling is much as possible.  Follow-up as needed if symptoms do not improve. All patient's questions were answered to her satisfaction.  This note is created using dictation transcription.  It may contain typographical errors, grammatical errors, improperly dictated words, background noise and other errors.         Relevant Orders    XR knee 4+ vw left injury      Subjective:     Patient ID: Jadyn Garza is a 64 y.o. female.  Chief Complaint:  Jaqui is a 64 year old female with a past medical history significant for Parkinson's disease presenting for left knee pain. She states that the pain started about 3 weeks ago when she fell coming down the stairs out of her daughters camper. She fell and landed directly on her left knee. Her pain initially was localized to the anterior knee, but she now states that the pain is around her patella. The pain has gradually been getting better and is now just \"annoying.\" She feels the discomfort mostly when she is kneeling, and states that she has avoided kneeling since her injury.  No prior injury to the left knee.    Allergy:  No Known Allergies  Medications:  all current active meds have been reviewed  Past Medical History:  Past Medical History:   Diagnosis Date    Anxiety     Breast cancer (HCC)     bilateral    Depression     Parkinson disease (HCC)     Squamous cell skin cancer " "11/23/2020    SCCIS right clavicle     Past Surgical History:  Past Surgical History:   Procedure Laterality Date    BREAST RECONSTRUCTION Bilateral     GANGLION CYST EXCISION Left     leg    MASTECTOMY Bilateral     MOHS SURGERY  01/06/2021    SCCIS right clavicle, Dr. Perla     SKIN BIOPSY      TONSILLECTOMY       Family History:  Family History   Problem Relation Age of Onset    Stroke Mother     Basal cell carcinoma Mother     Cancer Father     Stroke Brother      Social History:  Social History     Substance and Sexual Activity   Alcohol Use Yes    Comment: rare     Social History     Substance and Sexual Activity   Drug Use Never     Social History     Tobacco Use   Smoking Status Never   Smokeless Tobacco Never     Review of Systems   Constitutional: Negative.    HENT: Negative.     Eyes: Negative.    Respiratory: Negative.     Cardiovascular: Negative.    Gastrointestinal: Negative.    Endocrine: Negative.    Genitourinary: Negative.    Musculoskeletal:  Positive for arthralgias (left knee).   Skin: Negative.    Allergic/Immunologic: Negative.    Hematological: Negative.    Psychiatric/Behavioral: Negative.           Objective:  BP Readings from Last 1 Encounters:   12/10/24 143/82      Wt Readings from Last 1 Encounters:   03/12/25 72.1 kg (159 lb)      BMI:   Estimated body mass index is 24.9 kg/m² as calculated from the following:    Height as of this encounter: 5' 7\" (1.702 m).    Weight as of this encounter: 72.1 kg (159 lb).  BSA:   Estimated body surface area is 1.83 meters squared as calculated from the following:    Height as of this encounter: 5' 7\" (1.702 m).    Weight as of this encounter: 72.1 kg (159 lb).   Physical Exam  Vitals and nursing note reviewed.   Constitutional:       General: She is not in acute distress.     Appearance: Normal appearance. She is well-developed.   HENT:      Head: Normocephalic and atraumatic.      Right Ear: External ear normal.      Left Ear: External ear normal. "   Eyes:      Extraocular Movements: Extraocular movements intact.      Conjunctiva/sclera: Conjunctivae normal.   Pulmonary:      Effort: Pulmonary effort is normal. No respiratory distress.   Musculoskeletal:      Cervical back: Neck supple.      Left knee: No effusion.      Instability Tests: Medial Leonardo test negative and lateral Leonardo test negative.   Skin:     General: Skin is warm and dry.   Neurological:      Mental Status: She is alert and oriented to person, place, and time.      Deep Tendon Reflexes: Reflexes are normal and symmetric.   Psychiatric:         Mood and Affect: Mood normal.         Behavior: Behavior normal.       Left Knee Exam     Tenderness   The patient is experiencing tenderness in the patella (mild).    Range of Motion   The patient has normal left knee ROM.    Tests   Leonardo:  Medial - negative Lateral - negative  Varus: negative Valgus: negative  Patellar apprehension: negative    Other   Erythema: absent  Scars: absent  Sensation: normal  Pulse: present  Swelling: none  Effusion: no effusion present    Comments:  Patellofemoral joint crepitation with knee range of motion            I have personally reviewed pertinent films in PACS and my interpretation is x-rays of the left knee reveal no acute fractures.  There is mild osteoarthritis.    Scribe Attestation      I,:  Fern Butt PA-C am acting as a scribe while in the presence of the attending physician.:       I,:  Bartolome Strauss MD personally performed the services described in this documentation    as scribed in my presence.:

## 2025-03-12 NOTE — TELEPHONE ENCOUNTER
12/16/2024 12/16/2024 clonazePAM (Tablet, Disintegrating) 30.0 30 0.5 MG NA [facility]  Medicare 0 / 0 PA   1 [id number] 10/21/2024 10/21/2024 clonazePAM (Tablet, Disintegrating) 30.0 30 0.5 MG NA [facility]

## 2025-03-12 NOTE — ASSESSMENT & PLAN NOTE
Findings consistent with left knee contusion.  Findings and treatment options were discussed with the patient.  X-rays were reviewed with her.  Discussed x-rays revealed no evidence of acute fracture.  Discussed this condition can take up to 3 to 4 months to fully resolve.  Recommend ice, elevation and NSAIDs as needed for pain.  Recommend low impact exercises such as stationary bicycle or swimming.  Try to avoid kneeling is much as possible.  Follow-up as needed if symptoms do not improve. All patient's questions were answered to her satisfaction.  This note is created using dictation transcription.  It may contain typographical errors, grammatical errors, improperly dictated words, background noise and other errors.

## 2025-03-21 ENCOUNTER — OFFICE VISIT (OUTPATIENT)
Dept: FAMILY MEDICINE CLINIC | Facility: CLINIC | Age: 65
End: 2025-03-21
Payer: COMMERCIAL

## 2025-03-21 VITALS
SYSTOLIC BLOOD PRESSURE: 130 MMHG | TEMPERATURE: 97.7 F | RESPIRATION RATE: 18 BRPM | DIASTOLIC BLOOD PRESSURE: 72 MMHG | OXYGEN SATURATION: 97 % | BODY MASS INDEX: 25.15 KG/M2 | WEIGHT: 160.2 LBS | HEART RATE: 107 BPM | HEIGHT: 67 IN

## 2025-03-21 DIAGNOSIS — G20.B2 PARKINSON'S DISEASE WITH DYSKINESIA AND FLUCTUATING MANIFESTATIONS (HCC): ICD-10-CM

## 2025-03-21 DIAGNOSIS — F41.9 ANXIETY: ICD-10-CM

## 2025-03-21 DIAGNOSIS — E78.2 MIXED HYPERLIPIDEMIA: ICD-10-CM

## 2025-03-21 DIAGNOSIS — Z00.00 MEDICARE ANNUAL WELLNESS VISIT, SUBSEQUENT: Primary | ICD-10-CM

## 2025-03-21 DIAGNOSIS — F32.0 MAJOR DEPRESSIVE DISORDER, SINGLE EPISODE, MILD (HCC): ICD-10-CM

## 2025-03-21 PROCEDURE — G0439 PPPS, SUBSEQ VISIT: HCPCS | Performed by: FAMILY MEDICINE

## 2025-03-21 RX ORDER — PAROXETINE 20 MG/1
20 TABLET, FILM COATED ORAL DAILY
Qty: 90 TABLET | Refills: 3 | Status: SHIPPED | OUTPATIENT
Start: 2025-03-21

## 2025-03-21 RX ORDER — DULOXETIN HYDROCHLORIDE 60 MG/1
60 CAPSULE, DELAYED RELEASE ORAL DAILY
Qty: 90 CAPSULE | Refills: 3 | Status: SHIPPED | OUTPATIENT
Start: 2025-03-21

## 2025-03-21 NOTE — PATIENT INSTRUCTIONS
Medicare Preventive Visit Patient Instructions  Thank you for completing your Welcome to Medicare Visit or Medicare Annual Wellness Visit today. Your next wellness visit will be due in one year (3/22/2026).  The screening/preventive services that you may require over the next 5-10 years are detailed below. Some tests may not apply to you based off risk factors and/or age. Screening tests ordered at today's visit but not completed yet may show as past due. Also, please note that scanned in results may not display below.  Preventive Screenings:  Service Recommendations Previous Testing/Comments   Colorectal Cancer Screening  * Colonoscopy    * Fecal Occult Blood Test (FOBT)/Fecal Immunochemical Test (FIT)  * Fecal DNA/Cologuard Test  * Flexible Sigmoidoscopy Age: 45-75 years old   Colonoscopy: every 10 years (may be performed more frequently if at higher risk)  OR  FOBT/FIT: every 1 year  OR  Cologuard: every 3 years  OR  Sigmoidoscopy: every 5 years  Screening may be recommended earlier than age 45 if at higher risk for colorectal cancer. Also, an individualized decision between you and your healthcare provider will decide whether screening between the ages of 76-85 would be appropriate. Colonoscopy: Not on file  FOBT/FIT: Not on file  Cologuard: Not on file  Sigmoidoscopy: Not on file          Breast Cancer Screening Age: 40+ years old  Frequency: every 1-2 years  Not required if history of left and right mastectomy Mammogram: Not on file        Cervical Cancer Screening Between the ages of 21-29, pap smear recommended once every 3 years.   Between the ages of 30-65, can perform pap smear with HPV co-testing every 5 years.   Recommendations may differ for women with a history of total hysterectomy, cervical cancer, or abnormal pap smears in past. Pap Smear: Not on file        Hepatitis C Screening Once for adults born between 1945 and 1965  More frequently in patients at high risk for Hepatitis C Hep C Antibody:  10/25/2019        Diabetes Screening 1-2 times per year if you're at risk for diabetes or have pre-diabetes Fasting glucose: No results in last 5 years (No results in last 5 years)  A1C: No results in last 5 years (No results in last 5 years)      Cholesterol Screening Once every 5 years if you don't have a lipid disorder. May order more often based on risk factors. Lipid panel: 02/22/2023          Other Preventive Screenings Covered by Medicare:  Abdominal Aortic Aneurysm (AAA) Screening: covered once if your at risk. You're considered to be at risk if you have a family history of AAA.  Lung Cancer Screening: covers low dose CT scan once per year if you meet all of the following conditions: (1) Age 55-77; (2) No signs or symptoms of lung cancer; (3) Current smoker or have quit smoking within the last 15 years; (4) You have a tobacco smoking history of at least 20 pack years (packs per day multiplied by number of years you smoked); (5) You get a written order from a healthcare provider.  Glaucoma Screening: covered annually if you're considered high risk: (1) You have diabetes OR (2) Family history of glaucoma OR (3)  aged 50 and older OR (4)  American aged 65 and older  Osteoporosis Screening: covered every 2 years if you meet one of the following conditions: (1) You're estrogen deficient and at risk for osteoporosis based off medical history and other findings; (2) Have a vertebral abnormality; (3) On glucocorticoid therapy for more than 3 months; (4) Have primary hyperparathyroidism; (5) On osteoporosis medications and need to assess response to drug therapy.   Last bone density test (DXA Scan): Not on file.  HIV Screening: covered annually if you're between the age of 15-65. Also covered annually if you are younger than 15 and older than 65 with risk factors for HIV infection. For pregnant patients, it is covered up to 3 times per pregnancy.    Immunizations:  Immunization Recommendations    Influenza Vaccine Annual influenza vaccination during flu season is recommended for all persons aged >= 6 months who do not have contraindications   Pneumococcal Vaccine   * Pneumococcal conjugate vaccine = PCV13 (Prevnar 13), PCV15 (Vaxneuvance), PCV20 (Prevnar 20)  * Pneumococcal polysaccharide vaccine = PPSV23 (Pneumovax) Adults 19-65 yo with certain risk factors or if 65+ yo  If never received any pneumonia vaccine: recommend Prevnar 20 (PCV20)  Give PCV20 if previously received 1 dose of PCV13 or PPSV23   Hepatitis B Vaccine 3 dose series if at intermediate or high risk (ex: diabetes, end stage renal disease, liver disease)   Respiratory syncytial virus (RSV) Vaccine - COVERED BY MEDICARE PART D  * RSVPreF3 (Arexvy) CDC recommends that adults 60 years of age and older may receive a single dose of RSV vaccine using shared clinical decision-making (SCDM)   Tetanus (Td) Vaccine - COST NOT COVERED BY MEDICARE PART B Following completion of primary series, a booster dose should be given every 10 years to maintain immunity against tetanus. Td may also be given as tetanus wound prophylaxis.   Tdap Vaccine - COST NOT COVERED BY MEDICARE PART B Recommended at least once for all adults. For pregnant patients, recommended with each pregnancy.   Shingles Vaccine (Shingrix) - COST NOT COVERED BY MEDICARE PART B  2 shot series recommended in those 19 years and older who have or will have weakened immune systems or those 50 years and older     Health Maintenance Due:      Topic Date Due   • Colorectal Cancer Screening  Never done   • HIV Screening  Completed   • Hepatitis C Screening  Completed     Immunizations Due:      Topic Date Due   • Influenza Vaccine (1) 09/01/2024   • COVID-19 Vaccine (3 - 2024-25 season) 09/01/2024     Advance Directives   What are advance directives?  Advance directives are legal documents that state your wishes and plans for medical care. These plans are made ahead of time in case you lose your  ability to make decisions for yourself. Advance directives can apply to any medical decision, such as the treatments you want, and if you want to donate organs.   What are the types of advance directives?  There are many types of advance directives, and each state has rules about how to use them. You may choose a combination of any of the following:  Living will:  This is a written record of the treatment you want. You can also choose which treatments you do not want, which to limit, and which to stop at a certain time. This includes surgery, medicine, IV fluid, and tube feedings.   Durable power of  for healthcare (DPAHC):  This is a written record that states who you want to make healthcare choices for you when you are unable to make them for yourself. This person, called a proxy, is usually a family member or a friend. You may choose more than 1 proxy.  Do not resuscitate (DNR) order:  A DNR order is used in case your heart stops beating or you stop breathing. It is a request not to have certain forms of treatment, such as CPR. A DNR order may be included in other types of advance directives.  Medical directive:  This covers the care that you want if you are in a coma, near death, or unable to make decisions for yourself. You can list the treatments you want for each condition. Treatment may include pain medicine, surgery, blood transfusions, dialysis, IV or tube feedings, and a ventilator (breathing machine).  Values history:  This document has questions about your views, beliefs, and how you feel and think about life. This information can help others choose the care that you would choose.  Why are advance directives important?  An advance directive helps you control your care. Although spoken wishes may be used, it is better to have your wishes written down. Spoken wishes can be misunderstood, or not followed. Treatments may be given even if you do not want them. An advance directive may make it easier  for your family to make difficult choices about your care.   Urinary Incontinence   Urinary incontinence (UI)  is when you lose control of your bladder. UI develops because your bladder cannot store or empty urine properly. The 3 most common types of UI are stress incontinence, urge incontinence, or both.  Medicines:   May be given to help strengthen your bladder control. Report any side effects of medication to your healthcare provider.  Do pelvic muscle exercises often:  Your pelvic muscles help you stop urinating. Squeeze these muscles tight for 5 seconds, then relax for 5 seconds. Gradually work up to squeezing for 10 seconds. Do 3 sets of 15 repetitions a day, or as directed. This will help strengthen your pelvic muscles and improve bladder control.  Train your bladder:  Go to the bathroom at set times, such as every 2 hours, even if you do not feel the urge to go. You can also try to hold your urine when you feel the urge to go. For example, hold your urine for 5 minutes when you feel the urge to go. As that becomes easier, hold your urine for 10 minutes.   Self-care:   Keep a UI record.  Write down how often you leak urine and how much you leak. Make a note of what you were doing when you leaked urine.  Drink liquids as directed. You may need to limit the amount of liquid you drink to help control your urine leakage. Do not drink any liquid right before you go to bed. Limit or do not have drinks that contain caffeine or alcohol.   Prevent constipation.  Eat a variety of high-fiber foods. Good examples are high-fiber cereals, beans, vegetables, and whole-grain breads. Walking is the best way to trigger your intestines to have a bowel movement.  Exercise regularly and maintain a healthy weight.  Weight loss and exercise will decrease pressure on your bladder and help you control your leakage.   Use a catheter as directed  to help empty your bladder. A catheter is a tiny, plastic tube that is put into your  bladder to drain your urine.   Go to behavior therapy as directed.  Behavior therapy may be used to help you learn to control your urge to urinate.    Weight Management   Why it is important to manage your weight:  Being overweight increases your risk of health conditions such as heart disease, high blood pressure, type 2 diabetes, and certain types of cancer. It can also increase your risk for osteoarthritis, sleep apnea, and other respiratory problems. Aim for a slow, steady weight loss. Even a small amount of weight loss can lower your risk of health problems.  How to lose weight safely:  A safe and healthy way to lose weight is to eat fewer calories and get regular exercise. You can lose up about 1 pound a week by decreasing the number of calories you eat by 500 calories each day.   Healthy meal plan for weight management:  A healthy meal plan includes a variety of foods, contains fewer calories, and helps you stay healthy. A healthy meal plan includes the following:  Eat whole-grain foods more often.  A healthy meal plan should contain fiber. Fiber is the part of grains, fruits, and vegetables that is not broken down by your body. Whole-grain foods are healthy and provide extra fiber in your diet. Some examples of whole-grain foods are whole-wheat breads and pastas, oatmeal, brown rice, and bulgur.  Eat a variety of vegetables every day.  Include dark, leafy greens such as spinach, kale, melissa greens, and mustard greens. Eat yellow and orange vegetables such as carrots, sweet potatoes, and winter squash.   Eat a variety of fruits every day.  Choose fresh or canned fruit (canned in its own juice or light syrup) instead of juice. Fruit juice has very little or no fiber.  Eat low-fat dairy foods.  Drink fat-free (skim) milk or 1% milk. Eat fat-free yogurt and low-fat cottage cheese. Try low-fat cheeses such as mozzarella and other reduced-fat cheeses.  Choose meat and other protein foods that are low in fat.   Choose beans or other legumes such as split peas or lentils. Choose fish, skinless poultry (chicken or turkey), or lean cuts of red meat (beef or pork). Before you cook meat or poultry, cut off any visible fat.   Use less fat and oil.  Try baking foods instead of frying them. Add less fat, such as margarine, sour cream, regular salad dressing and mayonnaise to foods. Eat fewer high-fat foods. Some examples of high-fat foods include french fries, doughnuts, ice cream, and cakes.  Eat fewer sweets.  Limit foods and drinks that are high in sugar. This includes candy, cookies, regular soda, and sweetened drinks.  Exercise:  Exercise at least 30 minutes per day on most days of the week. Some examples of exercise include walking, biking, dancing, and swimming. You can also fit in more physical activity by taking the stairs instead of the elevator or parking farther away from stores. Ask your healthcare provider about the best exercise plan for you.      © Copyright [employer] 2018 Information is for End User's use only and may not be sold, redistributed or otherwise used for commercial purposes. All illustrations and images included in CareNotes® are the copyrighted property of [employer]., Inc. or [employer]

## 2025-03-21 NOTE — ASSESSMENT & PLAN NOTE
Follows with neurology  Medications per neurology    Orders:    Lipid panel; Future    Comprehensive metabolic panel; Future    CBC and differential; Future    TSH, 3rd generation with Free T4 reflex; Future

## 2025-03-21 NOTE — PROGRESS NOTES
Name: Jadyn Garza      : 1960      MRN: [id number]  Encounter Provider: Bere Claros DO  Encounter Date: 3/21/2025   Encounter department: [facility]    Assessment & Plan  Medicare annual wellness visit, subsequent    Orders:    Lipid panel; Future    Comprehensive metabolic panel; Future    CBC and differential; Future    TSH, 3rd generation with Free T4 reflex; Future    Parkinson's disease with dyskinesia and fluctuating manifestations (HCC)  Follows with neurology  Medications per neurology    Orders:    Lipid panel; Future    Comprehensive metabolic panel; Future    CBC and differential; Future    TSH, 3rd generation with Free T4 reflex; Future    Major depressive disorder, single episode, mild (HCC)  On medication as prescribed  She is going through a lot of family stress   She may be moving to [place] with her brother she is unsure  She knows she will need to est with a provider in [place] if she does move there permanently     Orders:    Lipid panel; Future    Comprehensive metabolic panel; Future    CBC and differential; Future    TSH, 3rd generation with Free T4 reflex; Future    DULoxetine (CYMBALTA) 60 mg delayed release capsule; Take 1 capsule (60 mg total) by mouth daily    Mixed hyperlipidemia    Orders:    Lipid panel; Future    Comprehensive metabolic panel; Future    CBC and differential; Future    TSH, 3rd generation with Free T4 reflex; Future    Anxiety    Orders:    PARoxetine (PAXIL) 20 mg tablet; Take 1 tablet (20 mg total) by mouth daily       Preventive health issues were discussed with patient, and age appropriate screening tests were ordered as noted in patient's After Visit Summary. Personalized health advice and appropriate referrals for health education or preventive services given if needed, as noted in patient's After Visit Summary.    History of Present Illness     HPI   Patient Care Team:  Bere Claros DO as PCP - General  (Family Medicine)  Bere Claros, DO as PCP - PCP-[facility] ([facility])  Bere Claros, DO as PCP - PCP-[facility] ([facility])    Review of Systems   Constitutional:  Negative for chills, fatigue and fever.   HENT:  Negative for congestion, postnasal drip, rhinorrhea and sinus pressure.    Eyes:  Negative for photophobia and visual disturbance.   Respiratory:  Negative for cough and shortness of breath.    Cardiovascular:  Negative for chest pain, palpitations and leg swelling.   Gastrointestinal:  Negative for abdominal pain, constipation, diarrhea, nausea and vomiting.   Genitourinary:  Negative for difficulty urinating and dysuria.   Musculoskeletal:  Negative for arthralgias and myalgias.   Skin:  Negative for color change and rash.   Neurological:  Negative for dizziness, weakness, light-headedness and headaches.   Psychiatric/Behavioral:  The patient is nervous/anxious.      Medical History Reviewed by provider this encounter:       Annual Wellness Visit Questionnaire   Jadyn is here for her Subsequent Wellness visit. Last Medicare Wellness visit information reviewed, patient interviewed, no change since last AWV.     Health Risk Assessment:   Patient rates overall health as fair. Patient feels that their physical health rating is slightly worse. Patient is dissatisfied with their life. Eyesight was rated as same. Hearing was rated as same. Patient feels that their emotional and mental health rating is slightly worse. Patients states they are never, rarely angry. Patient states they are sometimes unusually tired/fatigued. Pain experienced in the last 7 days has been none. Patient states that she has experienced no weight loss or gain in last 6 months.     Depression Screening:   PHQ-9 Score: 0      Fall Risk Screening:   In the past year, patient has experienced: no history of falling in past year      Urinary Incontinence Screening:   Patient has not leaked urine accidently in the last six  months.     Home Safety:  Patient does not have trouble with stairs inside or outside of their home. Patient has working smoke alarms and has working carbon monoxide detector. Home safety hazards include: none.     Nutrition:   Current diet is Regular.     Medications:   Patient is not currently taking any over-the-counter supplements. Patient is able to manage medications.     Activities of Daily Living (ADLs)/Instrumental Activities of Daily Living (IADLs):   Walk and transfer into and out of bed and chair?: Yes  Dress and groom yourself?: Yes    Bathe or shower yourself?: Yes    Feed yourself? Yes  Do your laundry/housekeeping?: Yes  Manage your money, pay your bills and track your expenses?: Yes  Make your own meals?: Yes    Do your own shopping?: Yes    Previous Hospitalizations:   Any hospitalizations or ED visits within the last 12 months?: No      Advance Care Planning:   Living will: No    Advanced directive counseling given: Yes      Cognitive Screening:   Provider or family/friend/caregiver concerned regarding cognition?: No    PREVENTIVE SCREENINGS      Cardiovascular Screening:    General: Screening Not Indicated, History Lipid Disorder and Risks and Benefits Discussed    Due for: Lipid Panel      Diabetes Screening:     General: Risks and Benefits Discussed    Due for: Blood Glucose      Breast Cancer Screening:     General: Screening Not Indicated and History Breast Cancer      Lung Cancer Screening:     General: Screening Not Indicated      Hepatitis C Screening:    General: Screening Current    Screening, Brief Intervention, and Referral to Treatment (SBIRT)     Screening  Typical number of drinks in a day: 0  Typical number of drinks in a week: 0  Interpretation: Low risk drinking behavior.    Single Item Drug Screening:  How often have you used an illegal drug (including marijuana) or a prescription medication for non-medical reasons in the past year? never    Single Item Drug Screen Score:  "0  Interpretation: Negative screen for possible drug use disorder    Social Drivers of Health     Financial Resource Strain: Low Risk  (2/20/2023)    Overall Financial Resource Strain (CARDIA)     Difficulty of Paying Living Expenses: Not hard at all   Food Insecurity: No Food Insecurity (3/21/2025)    Hunger Vital Sign     Worried About Running Out of Food in the Last Year: Never true     Ran Out of Food in the Last Year: Never true   Transportation Needs: No Transportation Needs (3/21/2025)    PRAPARE - Transportation     Lack of Transportation (Medical): No     Lack of Transportation (Non-Medical): No   Housing Stability: Unknown (3/21/2025)    Housing Stability Vital Sign     Unable to Pay for Housing in the Last Year: No     Homeless in the Last Year: No   Utilities: Not At Risk (3/21/2025)    [facility] Utilities     Threatened with loss of utilities: No     Vision Screening    Right eye Left eye Both eyes   Without correction      With correction   20/40       Objective   /72 (BP Location: Left arm, Patient Position: Sitting, Cuff Size: Large)   Pulse (!) 107   Temp 97.7 °F (36.5 °C) (Tympanic)   Resp 18   Ht 5' 7\" (1.702 m)   Wt 72.7 kg (160 lb 3.2 oz)   LMP  (LMP Unknown)   SpO2 97%   BMI 25.09 kg/m²     Physical Exam  Constitutional:       General: She is not in acute distress.     Appearance: Normal appearance. She is not ill-appearing, toxic-appearing or diaphoretic.   HENT:      Head: Normocephalic and atraumatic.      Right Ear: Tympanic membrane and ear canal normal.      Left Ear: Tympanic membrane and ear canal normal.      Nose: Nose normal. No congestion.      Mouth/Throat:      Mouth: Mucous membranes are moist.      Pharynx: Oropharynx is clear. No oropharyngeal exudate.   Eyes:      Extraocular Movements: Extraocular movements intact.      Conjunctiva/sclera: Conjunctivae normal.      Pupils: Pupils are equal, round, and reactive to light.   Cardiovascular:      Rate and Rhythm: Normal " rate and regular rhythm.      Pulses: Normal pulses.      Heart sounds: No murmur heard.  Pulmonary:      Effort: Pulmonary effort is normal.      Breath sounds: Normal breath sounds. No wheezing, rhonchi or rales.   Abdominal:      General: There is no distension.      Palpations: Abdomen is soft.      Tenderness: There is no abdominal tenderness.   Musculoskeletal:         General: No swelling or tenderness. Normal range of motion.      Cervical back: Normal range of motion and neck supple.   Skin:     General: Skin is warm and dry.      Capillary Refill: Capillary refill takes less than 2 seconds.   Neurological:      General: No focal deficit present.      Mental Status: She is alert and oriented to person, place, and time.      Cranial Nerves: No cranial nerve deficit.   Psychiatric:         Mood and Affect: Mood normal.         Behavior: Behavior normal.         Thought Content: Thought content normal.

## 2025-03-21 NOTE — ASSESSMENT & PLAN NOTE
On medication as prescribed  She is going through a lot of family stress   She may be moving to [place] with her brother she is unsure  She knows she will need to est with a provider in [place] if she does move there permanently     Orders:    Lipid panel; Future    Comprehensive metabolic panel; Future    CBC and differential; Future    TSH, 3rd generation with Free T4 reflex; Future    DULoxetine (CYMBALTA) 60 mg delayed release capsule; Take 1 capsule (60 mg total) by mouth daily

## 2025-03-26 ENCOUNTER — DOCUMENTATION (OUTPATIENT)
Dept: ADMINISTRATIVE | Facility: OTHER | Age: 65
End: 2025-03-26

## 2025-03-26 NOTE — PROGRESS NOTES
Annual Wellness Visit outreach is not required, an AWV was completed at the PCP office.    Thank you.  Joan Avila MA  [facility]

## 2025-05-29 ENCOUNTER — OFFICE VISIT (OUTPATIENT)
Dept: NEUROLOGY | Facility: CLINIC | Age: 65
End: 2025-05-29
Payer: COMMERCIAL

## 2025-05-29 VITALS
DIASTOLIC BLOOD PRESSURE: 64 MMHG | SYSTOLIC BLOOD PRESSURE: 124 MMHG | HEART RATE: 100 BPM | BODY MASS INDEX: 25.25 KG/M2 | WEIGHT: 161.2 LBS

## 2025-05-29 DIAGNOSIS — G20.A1 PARKINSON DISEASE (HCC): ICD-10-CM

## 2025-05-29 DIAGNOSIS — G20.B2 PARKINSON'S DISEASE WITH DYSKINESIA AND FLUCTUATING MANIFESTATIONS (HCC): Primary | ICD-10-CM

## 2025-05-29 DIAGNOSIS — F41.9 ANXIETY: ICD-10-CM

## 2025-05-29 PROCEDURE — G2211 COMPLEX E/M VISIT ADD ON: HCPCS | Performed by: PSYCHIATRY & NEUROLOGY

## 2025-05-29 PROCEDURE — 99214 OFFICE O/P EST MOD 30 MIN: CPT | Performed by: PSYCHIATRY & NEUROLOGY

## 2025-05-29 RX ORDER — CARBIDOPA AND LEVODOPA 25; 100 MG/1; MG/1
TABLET ORAL
Qty: 450 TABLET | Refills: 2 | Status: SHIPPED | OUTPATIENT
Start: 2025-05-29

## 2025-05-29 RX ORDER — CLONAZEPAM 0.5 MG/1
0.5 TABLET, ORALLY DISINTEGRATING ORAL
Qty: 30 TABLET | Refills: 0 | Status: SHIPPED | OUTPATIENT
Start: 2025-05-29

## 2025-05-29 RX ORDER — AMANTADINE HYDROCHLORIDE 100 MG/1
100 CAPSULE, GELATIN COATED ORAL 2 TIMES DAILY
Qty: 60 CAPSULE | Refills: 8 | Status: SHIPPED | OUTPATIENT
Start: 2025-05-29

## 2025-05-29 NOTE — ASSESSMENT & PLAN NOTE
Orders:    clonazePAM (KlonoPIN) 0.5 MG disintegrating tablet; Take 1 tablet (0.5 mg total) by mouth daily at bedtime

## 2025-05-29 NOTE — ASSESSMENT & PLAN NOTE
Motor fluctuation with generalized dyskinesia present throughout the day along with periods of off time more commonly in the afternoon or evening.  There may have been a confusion in the refill prescription there was an unintentional increase in levodopa in December after refill requests was sent by pharmacy. She was however experiencing dyskinesia and motor fluctuation when last seen in Oct as well.     After discussion we opted to reduce carbidopa/levodopa 25/100 1 tab at 9am, 1.5 tab at 1pm, 1 tab at 5pm and 9pm  Start amantadine 100mg at 9am and 3pm  Continue trihexyphenidyl 3 times daily      Time spent discussing options for motor fluctuations:  -Adding medication to help improve on time such as Nourianz, opicapone/entacapone, dopamine-agonist, MAO-B inhibitor, amantadine/ Gocovri (which can help reduce dyskinesia as well.    -Switching carbidopa/levodopa to a longer acting formulation such as Rytary or Crexont  -Considering infusion therapies such as foscarbidopa/Fos levodopa skin infusion pump (Vyalev) or apomorphine skin infusion pump (Onapgp)  - Neurosurgical options such as deep brain stimulation surgery to reduce off time and possibly dyskinesia   Orders:    amantadine (SYMMETREL) 100 mg capsule; Take 1 capsule (100 mg total) by mouth 2 (two) times a day 9am and 3pm

## 2025-05-29 NOTE — PATIENT INSTRUCTIONS
Motor fluctuation with dyskinesia.    Reduce carbidopa/levodopa 25/100 1 tab at 9am, 1.5 tab at 1pm, 1 tab at 5pm and 9pm  Start amantadine 100mg at 9am and 3pm  Continue trihexyphenidyl 3 times daily      Time spent discussing options for motor fluctuations:  -Adding medication to help improve on time such as Nourianz, opicapone/entacapone, dopamine-agonist, MAO-B inhibitor, amantadine/ Gocovri (which can help reduce dyskinesia as well.    -Switching carbidopa/levodopa to a longer acting formulation such as Rytary or Crexont  -Considering infusion therapies such as foscarbidopa/Fos levodopa skin infusion pump (Vyalev) or apomorphine skin infusion pump (Onapgp)  - Neurosurgical options such as deep brain stimulation surgery to reduce off time and possibly dyskinesia

## 2025-05-29 NOTE — PROGRESS NOTES
Review of Systems   Constitutional:  Positive for fatigue (Ongoing). Negative for appetite change and fever.   HENT:  Positive for voice change (Towards the evening time gets harder to get words out). Negative for hearing loss, tinnitus and trouble swallowing.    Eyes:  Positive for visual disturbance (Blurry). Negative for photophobia and pain.   Respiratory: Negative.  Negative for shortness of breath.    Cardiovascular: Negative.  Negative for palpitations.   Gastrointestinal: Negative.  Negative for nausea and vomiting.   Endocrine: Negative.  Negative for cold intolerance.   Genitourinary: Negative.  Negative for dysuria, frequency and urgency.   Musculoskeletal:  Positive for gait problem (In the evening has issue with Left foot when walking she tends to shuffles that foot) and neck stiffness (Right Side). Negative for back pain, myalgias and neck pain.        Balance Issues, had a big fall after bonnie, overall the balance has stayed the same  Dyskinesia, has stayed about  the same     Skin: Negative.  Negative for rash.   Allergic/Immunologic: Negative.    Neurological:  Negative for dizziness, tremors, seizures, syncope, facial asymmetry, speech difficulty, weakness, light-headedness, numbness and headaches.   Hematological: Negative.  Does not bruise/bleed easily.   Psychiatric/Behavioral:  Positive for sleep disturbance (Trouble at times staying asleep). Negative for confusion and hallucinations.    All other systems reviewed and are negative.

## 2025-05-29 NOTE — PROGRESS NOTES
Jadyn Garza      : 1960      MRN: [id number]  Encounter Provider: Amelia Aguilera MD  Encounter Date: 2025   Encounter department: [facility]  :  Assessment & Plan  Parkinson's disease with dyskinesia and fluctuating manifestations (HCC)  Motor fluctuation with generalized dyskinesia present throughout the day along with periods of off time more commonly in the afternoon or evening.  There may have been a confusion in the refill prescription there was an unintentional increase in levodopa in December after refill requests was sent by pharmacy. She was however experiencing dyskinesia and motor fluctuation when last seen in Oct as well.     After discussion we opted to reduce carbidopa/levodopa 25/100 1 tab at 9am, 1.5 tab at 1pm, 1 tab at 5pm and 9pm  Start amantadine 100mg at 9am and 3pm  Continue trihexyphenidyl 3 times daily      Time spent discussing options for motor fluctuations:  -Adding medication to help improve on time such as Nourianz, opicapone/entacapone, dopamine-agonist, MAO-B inhibitor, amantadine/ Gocovri (which can help reduce dyskinesia as well.    -Switching carbidopa/levodopa to a longer acting formulation such as Rytary or Crexont  -Considering infusion therapies such as foscarbidopa/Fos levodopa skin infusion pump (Vyalev) or apomorphine skin infusion pump (Onapgp)  - Neurosurgical options such as deep brain stimulation surgery to reduce off time and possibly dyskinesia   Orders:    amantadine (SYMMETREL) 100 mg capsule; Take 1 capsule (100 mg total) by mouth 2 (two) times a day 9am and 3pm    Parkinson disease (HCC)    Orders:    carbidopa-levodopa (SINEMET)  mg per tablet; take 1 tab at 9am, 5pm and 9pm and 1.5 tabs at 1pm    Anxiety    Orders:    clonazePAM (KlonoPIN) 0.5 MG disintegrating tablet; Take 1 tablet (0.5 mg total) by mouth daily at bedtime      Assessment & Plan          History of Present Illness      Jadyn  Greg is a  female, disabled, with breast cancer (BRRCA/ HRT r+) in 1990 & 2010 s/p surgery and chemotherapy , depression, Parkinson's disease and RLS,  who presents today for follow up. To review, symptoms began in 2014 with left sided bradykinesia and left pinky tremor. She was seen by a neurologist at  [facility] in [place]. LINO scan was positive.  Started on Sinemet up to 1.5 tid with response with later addition of Artane 2mg tid.     First seen by me Dec 2019 with mild facial and hand dyskinesia. Also with episodes of tongue dryness and a sensation of swelling in the evenings, after dinner leading to a change in speech.  She lives with her daughter, son In law and granddaughter. Mood has been well controlled on paroxetine 20mg qhs and duloxetine for years.      Current neuro regimen:  Sinemet 25/100 1.5 tab at 9am, 1pm,  5pm and 9pm   trihexyphenidyl 2mg tid (9am, 3pm, 9pm)  Klonopin 0.5mg 1/2 tab qhs       History of Present Illness  She continues to have generalized dyskinesia  She fell on to her face. She was at her [facility] home. She walked out and turned and was going down the 3 steps and fell onto her face. She suffered abrasion to the face. She was seen in the ER and was told she had a mild concussion.    She ambulates well when on. She broke her left pinky toe while at her brothers in [place]. She tripped on a small piece of wood.    When on symptoms are well controlled but she has dyskinesia.     Tremor can be present at times when off.  She can be off around 3pm or 5pm  and when she is tired.    Speech is different in the evening when tired. She finds it harder to pronounced things but other do not notice this.  There is no drooling. No difficulty chewing and swallowing except on episode when she ate a larger piece of meat.    Dressing independently.  Hygiene acts performed independently without difficulty.  There is occasional difficulty arising out of chair.     Sleeps well for the most  part.    There have been no symptoms of REM sleep behavior disorder or RLS.   There are no issues with constipation. She has some urinary leakage.   There are no experiences with lightheadedness on standing.     Cognition is unchanged.   No hallucinations.  No longer works.       Review of Systems I have personally reviewed the MA's review of systems and made changes as necessary.         Objective   /64 (BP Location: Left arm, Patient Position: Standing, Cuff Size: Standard)   Pulse 100   Wt 73.1 kg (161 lb 3.2 oz)   LMP  (LMP Unknown)   BMI 25.25 kg/m²     Physical Exam    Eyes:      Extraocular Movements: Extraocular movements intact.      Pupils: Pupils are equal, round, and reactive to light.       Neurological:      Mental Status: She is alert.      Motor: Motor strength is normal.      Neurological Exam  Mental Status  Alert. Oriented to person, place, time and situation. Speech: hypophonia. Language is fluent with no aphasia. Attention and concentration are normal.    Cranial Nerves  CN III, IV, VI: Extraocular movements intact bilaterally. Pupils equal round and reactive to light bilaterally.  CN VII: Full and symmetric facial movement.  CN VIII: Hearing is normal.  CN IX, X: Palate elevates symmetrically  CN XI: Shoulder shrug strength is normal.  CN XII: Tongue midline without atrophy or fasciculations.    Motor   Normal muscle tone. Strength is 5/5 throughout all four extremities.    Sensory  Light touch is normal in upper and lower extremities.     Coordination    Mild decrement on left Ft, Hg, DM   See motor UPDRS.    Gait   Able to rise from chair without using arms.  Reduced left arm swing. No freezing or festination .    Physical Exam    MDS UPDRS III                              3/23/22 9/29/22 05/24/23 10/16/24    5/29/25   Time since last dose: Took in office  2 hours 9am, took in office 9AM    Speech  0 0 0 0 0   Facial Expression  0 0 0 0 0   Rigidity - Neck  0 0 0 0 0   Rigidity -  Upper Extremity (Right)  0 0 0 0 0   Rigidity - Upper Extremity (Left)   0 0 1 0 0   Rigidity - Lower Extremity (Right)  0 0 0 0 0   Rigidity - Lower Extremity (Left)   0 0 0 0 0   Finger Taps (Right)   1 1 0 0 0   Finger Taps (Left)   2 1 1 1 1   Hand Movement (Right)  0 0 0 0 0   Hand Movement (Left)   1 1 2 1 1   Pronation/Supination (Right)  0 1 1 1 0   Pronation/Supination (Left)   1 2 2 1 1   Toe Tapping (Right) 0 1 1  1 1   Toe Tapping (Left) 2 2 3 2 2   Leg Agility (Right)  0 1 1 1 1   Leg Agility (Left)   1 1 1 1 1   Arising from Chair   0 0 0 0 0   Gait   1 1 2 2 1   Freezing of Gait 0 0 0 1 0   Postural Stability       0 0    Posture 0 0 0 1 1   Global spontaneity of movement 0 0 0 0 0   Postural Tremor (Right) 0 0 0  0 0   Postural Tremor (Left) 0 0 1 1 0   Kinetic Tremor (Right)  0 0 1 1 0   Kinetic Tremor (Left)  0 1 1 1 0   Rest tremor amplitude RUE 0 0 0 0 0   Rest tremor amplitude LUE 2 2 2 1 0   Rest tremor amplitude RLE 0 0 0 0 0   Reset tremor amplitude LLE 2 2 2 2 0   Lip/Jaw Tremor  0 0 0 0 0   Consistency of tremor 3 3 3  3 0   Motor Exam Tota          Dyskinesia: upper body, extremity, and facial dyskinesia   Dystonia : none  Results        Administrative Statements   I have spent a total time of 40 minutes in caring for this patient on the day of the visit/encounter including Risks and benefits of tx options, Instructions for management, Patient and family education, Impressions, Documenting in the medical record, and Obtaining or reviewing history  .

## 2025-06-09 ENCOUNTER — NURSE TRIAGE (OUTPATIENT)
Age: 65
End: 2025-06-09

## 2025-06-09 NOTE — TELEPHONE ENCOUNTER
"REASON FOR CONVERSATION: amantadine (SYMMETREL) 100 mg     SYMPTOMS: Patient wakes up with anxiety and has worsened anxiety over the past week.     OTHER HEALTH INFORMATION: Patient is concerned it is the amantadine which she started a week ago.     PROTOCOL DISPOSITION: Discuss with Provider and Call Back Patient    CARE ADVICE PROVIDED: Patient advised to call back if she has new or worsened symptoms.     PRACTICE FOLLOW-UP: Dr. Yohan Luna please advise. Thank you!    Madai confirmed call back number as [phone] and we may leave a message.       Answer Assessment - Initial Assessment Questions  1. CONCERN: \"Did anything happen that prompted you to call today?\"       Patient is concerned her  new script Amantadine may be worsening or causing anxiety.     2. ANXIETY SYMPTOMS: \"Can you describe how you (your loved one; patient) have been feeling?\" (e.g., tense, restless, panicky, anxious, keyed up, overwhelmed, sense of impending doom).      Patient said she is overwhelmed, restless, and thinks too much. Patient said she does have a lot of stressors currently in her life, the worst not knowing where she will live. Patient has been \"emotional, teary eyed\" and simple things bug her.     3. ONSET: \"How long have you been feeling this way?\" (e.g., hours, days, weeks)      Patient has had it for a long time but worse in the last week. Patient said she wakes up everyday with Anxiety and it seems worst in the morning.     4. FUNCTIONAL IMPAIRMENT: \"How have these feelings affected your ability to do daily activities?\" \"Have you had more difficulty than usual doing your normal daily activities?\" (e.g., getting better, same, worse; self-care, school, work, interactions)      Madai said it is hard to get things done because she does not know what to do next.     6. HISTORY: \"Have you felt this way before?\" \"Have you ever been diagnosed with an anxiety problem in the past?\" (e.g., generalized anxiety disorder, panic " "attacks, PTSD). If Yes, ask: \"How was this problem treated?\" (e.g., medicines, counseling, etc.)      Madai said she has been in therapy her whole life and she is currently in between therapists.    7. RISK OF HARM - SUICIDAL IDEATION: \"Do you ever have thoughts of hurting or killing yourself?\" If Yes, ask:  \"Do you have these feelings now?\" \"Do you have a plan on how you would do this?\"      Patient denies.     8. TREATMENT:  \"What has been done so far to treat this anxiety?\" (e.g., medicines, relaxation strategies). \"What has helped?\"      She is on two antidepressants that were previously prescribed by a therapist and her PCP has been refilling.      9. THERAPIST: \"Do you have a counselor or therapist?\" If Yes, ask: \"What is their name?\"      Patient is between therapists and is going to find another.     10. POTENTIAL TRIGGERS: \"Do you drink caffeinated beverages (e.g., coffee, chino, teas), and how much daily?\" \"Do you drink alcohol or use any drugs?\" \"Have you started any new medicines recently?\"        Patient is concerned mantadine (SYMMETREL) 100 mg capsule is triggering it.     11. PATIENT SUPPORT: \"Who is with you now?\" \"Who do you live with?\" \"Do you have family or friends who you can talk to?\"         Patient said she will follow up with her PCP and call the insurance to see who she can go to, to find a new therapist.     12. OTHER SYMPTOMS: \"Do you have any other symptoms?\" (e.g., feeling depressed, trouble concentrating, trouble sleeping, trouble breathing, palpitations or fast heartbeat, chest pain, sweating, nausea, or diarrhea)  Patient denied.    Protocols used: Anxiety and Panic Attack-Adult-[place]    "

## 2025-06-10 NOTE — TELEPHONE ENCOUNTER
Amelia Aguilera MD to Neurology Neurovascular Team 4  (Selected Message)        6/10/25  7:26 AM  Anxiety although not common is a possible side effect.  Will have her discontinue amantadine.

## 2025-07-08 DIAGNOSIS — G20.A1 PARKINSON DISEASE (HCC): ICD-10-CM

## 2025-07-09 RX ORDER — TRIHEXYPHENIDYL HYDROCHLORIDE 2 MG/1
2 TABLET ORAL
Qty: 270 TABLET | Refills: 1 | Status: SHIPPED | OUTPATIENT
Start: 2025-07-09

## 2025-07-10 ENCOUNTER — VBI (OUTPATIENT)
Dept: ADMINISTRATIVE | Facility: OTHER | Age: 65
End: 2025-07-10

## 2025-07-10 NOTE — TELEPHONE ENCOUNTER
07/10/25 3:04 PM     Chart reviewed for CRC: Colonoscopy ; nothing is submitted to the patient's insurance at this time.     Valarie Loza [facility]